# Patient Record
Sex: FEMALE | Race: WHITE | Employment: UNEMPLOYED | URBAN - METROPOLITAN AREA
[De-identification: names, ages, dates, MRNs, and addresses within clinical notes are randomized per-mention and may not be internally consistent; named-entity substitution may affect disease eponyms.]

---

## 2018-04-23 ENCOUNTER — HOSPITAL ENCOUNTER (OUTPATIENT)
Dept: GENERAL RADIOLOGY | Age: 58
Discharge: HOME | End: 2018-04-25
Payer: COMMERCIAL

## 2018-04-23 ENCOUNTER — HOSPITAL ENCOUNTER (OUTPATIENT)
Age: 58
Discharge: HOME | End: 2018-04-25
Payer: COMMERCIAL

## 2018-04-23 DIAGNOSIS — M54.9 DORSAL BACK PAIN: ICD-10-CM

## 2018-04-23 DIAGNOSIS — M54.5 LOW BACK PAIN, UNSPECIFIED BACK PAIN LATERALITY, UNSPECIFIED CHRONICITY, WITH SCIATICA PRESENCE UNSPECIFIED: ICD-10-CM

## 2018-04-23 PROCEDURE — 72072 X-RAY EXAM THORAC SPINE 3VWS: CPT

## 2018-04-23 PROCEDURE — 72100 X-RAY EXAM L-S SPINE 2/3 VWS: CPT

## 2023-10-06 ENCOUNTER — APPOINTMENT (OUTPATIENT)
Dept: RADIOLOGY | Facility: HOSPITAL | Age: 63
End: 2023-10-06
Payer: MEDICAID

## 2023-10-06 ENCOUNTER — HOSPITAL ENCOUNTER (EMERGENCY)
Facility: HOSPITAL | Age: 63
Discharge: OTHER INSTITUTION | End: 2023-10-07
Attending: EMERGENCY MEDICINE | Admitting: EMERGENCY MEDICINE
Payer: MEDICAID

## 2023-10-06 DIAGNOSIS — K62.5 RECTAL BLEEDING: ICD-10-CM

## 2023-10-06 DIAGNOSIS — R19.7 DIARRHEA, UNSPECIFIED TYPE: ICD-10-CM

## 2023-10-06 DIAGNOSIS — E87.5 HYPERKALEMIA: ICD-10-CM

## 2023-10-06 DIAGNOSIS — E86.0 SEVERE DEHYDRATION: ICD-10-CM

## 2023-10-06 DIAGNOSIS — R57.9 SHOCK (MULTI): Primary | ICD-10-CM

## 2023-10-06 DIAGNOSIS — A41.9 SEPSIS, DUE TO UNSPECIFIED ORGANISM, UNSPECIFIED WHETHER ACUTE ORGAN DYSFUNCTION PRESENT (MULTI): ICD-10-CM

## 2023-10-06 DIAGNOSIS — N17.9 ACUTE KIDNEY INJURY (CMS-HCC): ICD-10-CM

## 2023-10-06 DIAGNOSIS — R55 NEAR SYNCOPE: ICD-10-CM

## 2023-10-06 DIAGNOSIS — E87.1 HYPONATREMIA: ICD-10-CM

## 2023-10-06 LAB
ALBUMIN SERPL BCP-MCNC: 4.4 G/DL (ref 3.4–5)
ALP SERPL-CCNC: 72 U/L (ref 33–136)
ALT SERPL W P-5'-P-CCNC: 31 U/L (ref 7–45)
ANION GAP BLDV CALCULATED.4IONS-SCNC: 20 MMOL/L (ref 10–25)
ANION GAP SERPL CALC-SCNC: 19 MMOL/L (ref 10–20)
APPARATUS: ABNORMAL
APPEARANCE UR: ABNORMAL
AST SERPL W P-5'-P-CCNC: 49 U/L (ref 9–39)
BASE EXCESS BLDV CALC-SCNC: -7.9 MMOL/L (ref -2–3)
BASOPHILS # BLD MANUAL: 0 X10*3/UL (ref 0–0.1)
BASOPHILS NFR BLD MANUAL: 0 %
BILIRUB DIRECT SERPL-MCNC: 0.1 MG/DL (ref 0–0.3)
BILIRUB SERPL-MCNC: 0.5 MG/DL (ref 0–1.2)
BILIRUB UR STRIP.AUTO-MCNC: NEGATIVE MG/DL
BNP SERPL-MCNC: 17 PG/ML (ref 0–99)
BODY TEMPERATURE: 37 DEGREES CELSIUS
BUN SERPL-MCNC: 37 MG/DL (ref 6–23)
CA-I BLDV-SCNC: 1.22 MMOL/L (ref 1.1–1.33)
CALCIUM SERPL-MCNC: 10.2 MG/DL (ref 8.6–10.3)
CARDIAC TROPONIN I PNL SERPL HS: 21 NG/L (ref 0–13)
CHLORIDE BLDV-SCNC: 84 MMOL/L (ref 98–107)
CHLORIDE SERPL-SCNC: 84 MMOL/L (ref 98–107)
CO2 SERPL-SCNC: 21 MMOL/L (ref 21–32)
COLOR UR: YELLOW
CREAT SERPL-MCNC: 2.12 MG/DL (ref 0.5–1.05)
EOSINOPHIL # BLD MANUAL: 1.09 X10*3/UL (ref 0–0.7)
EOSINOPHIL NFR BLD MANUAL: 4 %
ERYTHROCYTE [DISTWIDTH] IN BLOOD BY AUTOMATED COUNT: 14 % (ref 11.5–14.5)
GFR SERPL CREATININE-BSD FRML MDRD: 26 ML/MIN/1.73M*2
GLUCOSE BLD MANUAL STRIP-MCNC: 127 MG/DL (ref 74–99)
GLUCOSE BLD MANUAL STRIP-MCNC: 162 MG/DL (ref 74–99)
GLUCOSE BLDV-MCNC: 139 MG/DL (ref 74–99)
GLUCOSE SERPL-MCNC: 132 MG/DL (ref 74–99)
GLUCOSE UR STRIP.AUTO-MCNC: NEGATIVE MG/DL
HCO3 BLDV-SCNC: 18.3 MMOL/L (ref 22–26)
HCT VFR BLD AUTO: 35.8 % (ref 36–46)
HCT VFR BLD EST: 37 % (ref 36–46)
HEMOCCULT SP1 STL QL: POSITIVE
HGB BLD-MCNC: 12.4 G/DL (ref 12–16)
HGB BLDV-MCNC: 12.3 G/DL (ref 12–16)
IMM GRANULOCYTES # BLD AUTO: 0.39 X10*3/UL (ref 0–0.7)
IMM GRANULOCYTES NFR BLD AUTO: 1.4 % (ref 0–0.9)
INHALED O2 CONCENTRATION: 28 %
INR PPP: 0.9 (ref 0.9–1.1)
KETONES UR STRIP.AUTO-MCNC: NEGATIVE MG/DL
LACTATE BLDV-SCNC: 5.4 MMOL/L (ref 0.4–2)
LACTATE SERPL-SCNC: 5.1 MMOL/L (ref 0.4–2)
LEUKOCYTE ESTERASE UR QL STRIP.AUTO: ABNORMAL
LIPASE SERPL-CCNC: 155 U/L (ref 9–82)
LYMPHOCYTES # BLD MANUAL: 1.63 X10*3/UL (ref 1.2–4.8)
LYMPHOCYTES NFR BLD MANUAL: 6 %
MAGNESIUM SERPL-MCNC: 2.08 MG/DL (ref 1.6–2.4)
MCH RBC QN AUTO: 32.5 PG (ref 26–34)
MCHC RBC AUTO-ENTMCNC: 34.6 G/DL (ref 32–36)
MCV RBC AUTO: 94 FL (ref 80–100)
MONOCYTES # BLD MANUAL: 1.36 X10*3/UL (ref 0.1–1)
MONOCYTES NFR BLD MANUAL: 5 %
NEUTS SEG # BLD MANUAL: 23.12 X10*3/UL (ref 1.2–7)
NEUTS SEG NFR BLD MANUAL: 85 %
NITRITE UR QL STRIP.AUTO: NEGATIVE
NRBC BLD-RTO: 0 /100 WBCS (ref 0–0)
OXYHGB MFR BLDV: 56.4 % (ref 45–75)
PCO2 BLDV: 39 MM HG (ref 41–51)
PH BLDV: 7.28 PH (ref 7.33–7.43)
PH UR STRIP.AUTO: 5 [PH]
PLATELET # BLD AUTO: 455 X10*3/UL (ref 150–450)
PMV BLD AUTO: 8.6 FL (ref 7.5–11.5)
PO2 BLDV: 38 MM HG (ref 35–45)
POTASSIUM BLDV-SCNC: 6.3 MMOL/L (ref 3.5–5.3)
POTASSIUM SERPL-SCNC: 6 MMOL/L (ref 3.5–5.3)
PROT SERPL-MCNC: 7.1 G/DL (ref 6.4–8.2)
PROT UR STRIP.AUTO-MCNC: ABNORMAL MG/DL
PROTHROMBIN TIME: 10.2 SECONDS (ref 9.8–12.8)
RBC # BLD AUTO: 3.82 X10*6/UL (ref 4–5.2)
RBC # UR STRIP.AUTO: NEGATIVE /UL
RBC MORPH BLD: ABNORMAL
SAO2 % BLDV: 58 % (ref 45–75)
SARS-COV-2 RNA RESP QL NAA+PROBE: NOT DETECTED
SODIUM BLDV-SCNC: 116 MMOL/L (ref 136–145)
SODIUM SERPL-SCNC: 118 MMOL/L (ref 136–145)
SP GR UR STRIP.AUTO: 1.02
TOTAL CELLS COUNTED BLD: 100
UROBILINOGEN UR STRIP.AUTO-MCNC: ABNORMAL MG/DL
WBC # BLD AUTO: 27.2 X10*3/UL (ref 4.4–11.3)

## 2023-10-06 PROCEDURE — 36415 COLL VENOUS BLD VENIPUNCTURE: CPT | Performed by: EMERGENCY MEDICINE

## 2023-10-06 PROCEDURE — 71250 CT THORAX DX C-: CPT

## 2023-10-06 PROCEDURE — 96365 THER/PROPH/DIAG IV INF INIT: CPT

## 2023-10-06 PROCEDURE — 99291 CRITICAL CARE FIRST HOUR: CPT | Performed by: EMERGENCY MEDICINE

## 2023-10-06 PROCEDURE — 71045 X-RAY EXAM CHEST 1 VIEW: CPT | Mod: FY

## 2023-10-06 PROCEDURE — 2500000005 HC RX 250 GENERAL PHARMACY W/O HCPCS

## 2023-10-06 PROCEDURE — 84132 ASSAY OF SERUM POTASSIUM: CPT | Mod: 59

## 2023-10-06 PROCEDURE — 74176 CT ABD & PELVIS W/O CONTRAST: CPT

## 2023-10-06 PROCEDURE — 85610 PROTHROMBIN TIME: CPT | Performed by: EMERGENCY MEDICINE

## 2023-10-06 PROCEDURE — 71045 X-RAY EXAM CHEST 1 VIEW: CPT | Performed by: SURGERY

## 2023-10-06 PROCEDURE — 85007 BL SMEAR W/DIFF WBC COUNT: CPT | Performed by: EMERGENCY MEDICINE

## 2023-10-06 PROCEDURE — 87040 BLOOD CULTURE FOR BACTERIA: CPT | Mod: CMCLAB,CONLAB | Performed by: EMERGENCY MEDICINE

## 2023-10-06 PROCEDURE — 83735 ASSAY OF MAGNESIUM: CPT | Performed by: EMERGENCY MEDICINE

## 2023-10-06 PROCEDURE — 87075 CULTR BACTERIA EXCEPT BLOOD: CPT | Mod: CMCLAB,CONLAB | Performed by: EMERGENCY MEDICINE

## 2023-10-06 PROCEDURE — 96375 TX/PRO/DX INJ NEW DRUG ADDON: CPT | Mod: XU

## 2023-10-06 PROCEDURE — 2500000004 HC RX 250 GENERAL PHARMACY W/ HCPCS (ALT 636 FOR OP/ED)

## 2023-10-06 PROCEDURE — 82947 ASSAY GLUCOSE BLOOD QUANT: CPT

## 2023-10-06 PROCEDURE — 87635 SARS-COV-2 COVID-19 AMP PRB: CPT | Performed by: EMERGENCY MEDICINE

## 2023-10-06 PROCEDURE — 74176 CT ABD & PELVIS W/O CONTRAST: CPT | Performed by: STUDENT IN AN ORGANIZED HEALTH CARE EDUCATION/TRAINING PROGRAM

## 2023-10-06 PROCEDURE — 84484 ASSAY OF TROPONIN QUANT: CPT | Performed by: EMERGENCY MEDICINE

## 2023-10-06 PROCEDURE — 71250 CT THORAX DX C-: CPT | Performed by: STUDENT IN AN ORGANIZED HEALTH CARE EDUCATION/TRAINING PROGRAM

## 2023-10-06 PROCEDURE — 87493 C DIFF AMPLIFIED PROBE: CPT | Mod: CMCLAB,CONLAB | Performed by: EMERGENCY MEDICINE

## 2023-10-06 PROCEDURE — 2500000004 HC RX 250 GENERAL PHARMACY W/ HCPCS (ALT 636 FOR OP/ED): Mod: SE | Performed by: EMERGENCY MEDICINE

## 2023-10-06 PROCEDURE — 87086 URINE CULTURE/COLONY COUNT: CPT | Mod: CMCLAB,CONLAB | Performed by: EMERGENCY MEDICINE

## 2023-10-06 PROCEDURE — 84155 ASSAY OF PROTEIN SERUM: CPT | Performed by: EMERGENCY MEDICINE

## 2023-10-06 PROCEDURE — 96361 HYDRATE IV INFUSION ADD-ON: CPT | Mod: XU

## 2023-10-06 PROCEDURE — 82805 BLOOD GASES W/O2 SATURATION: CPT

## 2023-10-06 PROCEDURE — 82270 OCCULT BLOOD FECES: CPT | Performed by: EMERGENCY MEDICINE

## 2023-10-06 PROCEDURE — 83605 ASSAY OF LACTIC ACID: CPT | Performed by: EMERGENCY MEDICINE

## 2023-10-06 PROCEDURE — 81003 URINALYSIS AUTO W/O SCOPE: CPT | Performed by: EMERGENCY MEDICINE

## 2023-10-06 PROCEDURE — 83690 ASSAY OF LIPASE: CPT | Performed by: EMERGENCY MEDICINE

## 2023-10-06 PROCEDURE — 2500000001 HC RX 250 WO HCPCS SELF ADMINISTERED DRUGS (ALT 637 FOR MEDICARE OP): Performed by: EMERGENCY MEDICINE

## 2023-10-06 PROCEDURE — 80053 COMPREHEN METABOLIC PANEL: CPT | Performed by: EMERGENCY MEDICINE

## 2023-10-06 PROCEDURE — 87040 BLOOD CULTURE FOR BACTERIA: CPT | Mod: CMCLAB,CONLAB,91 | Performed by: EMERGENCY MEDICINE

## 2023-10-06 PROCEDURE — 2500000002 HC RX 250 W HCPCS SELF ADMINISTERED DRUGS (ALT 637 FOR MEDICARE OP, ALT 636 FOR OP/ED): Performed by: EMERGENCY MEDICINE

## 2023-10-06 PROCEDURE — 82248 BILIRUBIN DIRECT: CPT | Performed by: EMERGENCY MEDICINE

## 2023-10-06 PROCEDURE — 85027 COMPLETE CBC AUTOMATED: CPT | Performed by: EMERGENCY MEDICINE

## 2023-10-06 PROCEDURE — 51702 INSERT TEMP BLADDER CATH: CPT

## 2023-10-06 PROCEDURE — 83880 ASSAY OF NATRIURETIC PEPTIDE: CPT | Performed by: EMERGENCY MEDICINE

## 2023-10-06 RX ORDER — CALCIUM GLUCONATE 98 MG/ML
INJECTION, SOLUTION INTRAVENOUS
Status: COMPLETED
Start: 2023-10-06 | End: 2023-10-06

## 2023-10-06 RX ORDER — DEXTROSE MONOHYDRATE 25 G/50ML
25 INJECTION, SOLUTION INTRAVENOUS ONCE
Status: DISCONTINUED | OUTPATIENT
Start: 2023-10-06 | End: 2023-10-07 | Stop reason: HOSPADM

## 2023-10-06 RX ORDER — ONDANSETRON HYDROCHLORIDE 2 MG/ML
INJECTION, SOLUTION INTRAVENOUS
Status: DISCONTINUED
Start: 2023-10-06 | End: 2023-10-07 | Stop reason: HOSPADM

## 2023-10-06 RX ORDER — NAPROXEN SODIUM 220 MG/1
324 TABLET, FILM COATED ORAL ONCE
Status: COMPLETED | OUTPATIENT
Start: 2023-10-06 | End: 2023-10-06

## 2023-10-06 RX ORDER — CALCIUM GLUCONATE 20 MG/ML
2 INJECTION, SOLUTION INTRAVENOUS ONCE
Status: DISCONTINUED | OUTPATIENT
Start: 2023-10-06 | End: 2023-10-07 | Stop reason: HOSPADM

## 2023-10-06 RX ORDER — INDOMETHACIN 25 MG/1
50 CAPSULE ORAL ONCE
Status: DISCONTINUED | OUTPATIENT
Start: 2023-10-06 | End: 2023-10-07 | Stop reason: HOSPADM

## 2023-10-06 RX ORDER — NOREPINEPHRINE BITARTRATE/D5W 8 MG/250ML
PLASTIC BAG, INJECTION (ML) INTRAVENOUS
Status: COMPLETED
Start: 2023-10-06 | End: 2023-10-06

## 2023-10-06 RX ORDER — NOREPINEPHRINE BITARTRATE/D5W 8 MG/250ML
.01-1 PLASTIC BAG, INJECTION (ML) INTRAVENOUS CONTINUOUS
Status: DISCONTINUED | OUTPATIENT
Start: 2023-10-06 | End: 2023-10-07 | Stop reason: HOSPADM

## 2023-10-06 RX ORDER — DEXTROSE 50 % IN WATER (D50W) INTRAVENOUS SYRINGE
Status: COMPLETED
Start: 2023-10-06 | End: 2023-10-06

## 2023-10-06 RX ORDER — SODIUM CHLORIDE 9 MG/ML
150 INJECTION, SOLUTION INTRAVENOUS CONTINUOUS
Status: DISCONTINUED | OUTPATIENT
Start: 2023-10-06 | End: 2023-10-07 | Stop reason: HOSPADM

## 2023-10-06 RX ORDER — SODIUM BICARBONATE 1 MEQ/ML
SYRINGE (ML) INTRAVENOUS
Status: COMPLETED
Start: 2023-10-06 | End: 2023-10-06

## 2023-10-06 RX ORDER — METRONIDAZOLE 500 MG/100ML
500 INJECTION, SOLUTION INTRAVENOUS EVERY 8 HOURS
Status: DISCONTINUED | OUTPATIENT
Start: 2023-10-06 | End: 2023-10-07 | Stop reason: HOSPADM

## 2023-10-06 RX ADMIN — CALCIUM GLUCONATE 2 G: 98 INJECTION, SOLUTION INTRAVENOUS at 21:10

## 2023-10-06 RX ADMIN — HYDROCORTISONE SODIUM SUCCINATE 100 MG: 100 INJECTION, POWDER, FOR SOLUTION INTRAMUSCULAR; INTRAVENOUS at 21:30

## 2023-10-06 RX ADMIN — DEXTROSE MONOHYDRATE 50 ML: 25 INJECTION, SOLUTION INTRAVENOUS at 21:10

## 2023-10-06 RX ADMIN — SODIUM BICARBONATE 50 MEQ: 84 INJECTION, SOLUTION INTRAVENOUS at 20:58

## 2023-10-06 RX ADMIN — ASPIRIN 81 MG CHEWABLE TABLET 324 MG: 81 TABLET CHEWABLE at 20:55

## 2023-10-06 RX ADMIN — METRONIDAZOLE 500 MG: 500 INJECTION, SOLUTION INTRAVENOUS at 23:48

## 2023-10-06 RX ADMIN — INSULIN HUMAN 5 UNITS: 100 INJECTION, SOLUTION PARENTERAL at 21:12

## 2023-10-06 RX ADMIN — NOREPINEPHRINE BITARTRATE 0.01 MCG/KG/MIN: 8 INJECTION, SOLUTION INTRAVENOUS at 21:25

## 2023-10-06 RX ADMIN — SODIUM CHLORIDE 1000 ML: 9 INJECTION, SOLUTION INTRAVENOUS at 21:24

## 2023-10-06 RX ADMIN — Medication 0.01 MCG/KG/MIN: at 21:25

## 2023-10-06 RX ADMIN — SODIUM CHLORIDE 1000 ML: 9 INJECTION, SOLUTION INTRAVENOUS at 20:55

## 2023-10-06 ASSESSMENT — PAIN DESCRIPTION - FREQUENCY: FREQUENCY: CONSTANT/CONTINUOUS

## 2023-10-06 ASSESSMENT — PAIN - FUNCTIONAL ASSESSMENT: PAIN_FUNCTIONAL_ASSESSMENT: 0-10

## 2023-10-06 ASSESSMENT — PAIN DESCRIPTION - DESCRIPTORS: DESCRIPTORS: CRAMPING

## 2023-10-06 ASSESSMENT — COLUMBIA-SUICIDE SEVERITY RATING SCALE - C-SSRS
1. IN THE PAST MONTH, HAVE YOU WISHED YOU WERE DEAD OR WISHED YOU COULD GO TO SLEEP AND NOT WAKE UP?: NO
2. HAVE YOU ACTUALLY HAD ANY THOUGHTS OF KILLING YOURSELF?: NO
6. HAVE YOU EVER DONE ANYTHING, STARTED TO DO ANYTHING, OR PREPARED TO DO ANYTHING TO END YOUR LIFE?: NO

## 2023-10-06 ASSESSMENT — PAIN DESCRIPTION - ONSET: ONSET: ONGOING

## 2023-10-06 ASSESSMENT — PAIN DESCRIPTION - LOCATION: LOCATION: ABDOMEN

## 2023-10-06 ASSESSMENT — PAIN SCALES - GENERAL
PAINLEVEL_OUTOF10: 9
PAINLEVEL_OUTOF10: 9

## 2023-10-06 ASSESSMENT — PAIN DESCRIPTION - PROGRESSION: CLINICAL_PROGRESSION: NOT CHANGED

## 2023-10-07 ENCOUNTER — HOSPITAL ENCOUNTER (INPATIENT)
Facility: HOSPITAL | Age: 63
LOS: 9 days | Discharge: HOME | End: 2023-10-16
Attending: INTERNAL MEDICINE | Admitting: INTERNAL MEDICINE
Payer: MEDICAID

## 2023-10-07 ENCOUNTER — APPOINTMENT (OUTPATIENT)
Dept: RADIOLOGY | Facility: HOSPITAL | Age: 63
End: 2023-10-07
Payer: MEDICAID

## 2023-10-07 VITALS
HEIGHT: 63 IN | OXYGEN SATURATION: 98 % | TEMPERATURE: 97.4 F | RESPIRATION RATE: 15 BRPM | SYSTOLIC BLOOD PRESSURE: 119 MMHG | WEIGHT: 237 LBS | DIASTOLIC BLOOD PRESSURE: 87 MMHG | HEART RATE: 109 BPM | BODY MASS INDEX: 41.99 KG/M2

## 2023-10-07 DIAGNOSIS — A41.9 SEPTIC SHOCK (MULTI): Primary | ICD-10-CM

## 2023-10-07 DIAGNOSIS — K52.9 COLITIS: ICD-10-CM

## 2023-10-07 DIAGNOSIS — I50.9 CHRONIC CONGESTIVE HEART FAILURE, UNSPECIFIED HEART FAILURE TYPE (MULTI): Chronic | ICD-10-CM

## 2023-10-07 DIAGNOSIS — R65.21 SEPTIC SHOCK (MULTI): Primary | ICD-10-CM

## 2023-10-07 DIAGNOSIS — F10.10 ETOH ABUSE: ICD-10-CM

## 2023-10-07 LAB
ALBUMIN SERPL BCP-MCNC: 2.9 G/DL (ref 3.4–5)
ALBUMIN SERPL BCP-MCNC: 3.4 G/DL (ref 3.4–5)
ANION GAP BLDV CALCULATED.4IONS-SCNC: 12 MMOL/L (ref 10–25)
ANION GAP SERPL CALC-SCNC: 13 MMOL/L (ref 10–20)
ANION GAP SERPL CALC-SCNC: 13 MMOL/L (ref 10–20)
ANION GAP SERPL CALC-SCNC: 14 MMOL/L (ref 10–20)
APPARATUS: ABNORMAL
BASE EXCESS BLDV CALC-SCNC: -7.9 MMOL/L (ref -2–3)
BODY TEMPERATURE: 37 DEGREES CELSIUS
BUN SERPL-MCNC: 22 MG/DL (ref 6–23)
BUN SERPL-MCNC: 30 MG/DL (ref 6–23)
BUN SERPL-MCNC: 39 MG/DL (ref 6–23)
C DIF TOX TCDA+TCDB STL QL NAA+PROBE: NOT DETECTED
CA-I BLDV-SCNC: 0.97 MMOL/L (ref 1.1–1.33)
CALCIUM SERPL-MCNC: 6.8 MG/DL (ref 8.6–10.3)
CALCIUM SERPL-MCNC: 8.6 MG/DL (ref 8.6–10.3)
CALCIUM SERPL-MCNC: 9.1 MG/DL (ref 8.6–10.3)
CHLORIDE BLDV-SCNC: 100 MMOL/L (ref 98–107)
CHLORIDE SERPL-SCNC: 91 MMOL/L (ref 98–107)
CHLORIDE SERPL-SCNC: 93 MMOL/L (ref 98–107)
CHLORIDE SERPL-SCNC: 96 MMOL/L (ref 98–107)
CO2 SERPL-SCNC: 20 MMOL/L (ref 21–32)
CO2 SERPL-SCNC: 22 MMOL/L (ref 21–32)
CO2 SERPL-SCNC: 23 MMOL/L (ref 21–32)
CREAT SERPL-MCNC: 1.17 MG/DL (ref 0.5–1.05)
CREAT SERPL-MCNC: 1.56 MG/DL (ref 0.5–1.05)
CREAT SERPL-MCNC: 2.04 MG/DL (ref 0.5–1.05)
ERYTHROCYTE [DISTWIDTH] IN BLOOD BY AUTOMATED COUNT: 14.3 % (ref 11.5–14.5)
FLOW: 2 LPM
GFR SERPL CREATININE-BSD FRML MDRD: 27 ML/MIN/1.73M*2
GFR SERPL CREATININE-BSD FRML MDRD: 38 ML/MIN/1.73M*2
GFR SERPL CREATININE-BSD FRML MDRD: 53 ML/MIN/1.73M*2
GLUCOSE BLD MANUAL STRIP-MCNC: 126 MG/DL (ref 74–99)
GLUCOSE BLD MANUAL STRIP-MCNC: 145 MG/DL (ref 74–99)
GLUCOSE BLD MANUAL STRIP-MCNC: 157 MG/DL (ref 74–99)
GLUCOSE BLDV-MCNC: 178 MG/DL (ref 74–99)
GLUCOSE SERPL-MCNC: 129 MG/DL (ref 74–99)
GLUCOSE SERPL-MCNC: 154 MG/DL (ref 74–99)
GLUCOSE SERPL-MCNC: 175 MG/DL (ref 74–99)
HCO3 BLDV-SCNC: 17.6 MMOL/L (ref 22–26)
HCT VFR BLD AUTO: 31.1 % (ref 36–46)
HCT VFR BLD EST: 28 % (ref 36–46)
HGB BLD-MCNC: 10.5 G/DL (ref 12–16)
HGB BLDV-MCNC: 9.3 G/DL (ref 12–16)
LACTATE BLDV-SCNC: 1.4 MMOL/L (ref 0.4–2)
LACTATE SERPL-SCNC: 1.2 MMOL/L (ref 0.4–2)
MAGNESIUM SERPL-MCNC: 1.57 MG/DL (ref 1.6–2.4)
MAGNESIUM SERPL-MCNC: 1.91 MG/DL (ref 1.6–2.4)
MCH RBC QN AUTO: 31.3 PG (ref 26–34)
MCHC RBC AUTO-ENTMCNC: 33.8 G/DL (ref 32–36)
MCV RBC AUTO: 93 FL (ref 80–100)
NRBC BLD-RTO: 0 /100 WBCS (ref 0–0)
OXYHGB MFR BLDV: 65.7 % (ref 45–75)
PCO2 BLDV: 35 MM HG (ref 41–51)
PH BLDV: 7.31 PH (ref 7.33–7.43)
PHOSPHATE SERPL-MCNC: 3.4 MG/DL (ref 2.5–4.9)
PHOSPHATE SERPL-MCNC: 4.3 MG/DL (ref 2.5–4.9)
PLATELET # BLD AUTO: 400 X10*3/UL (ref 150–450)
PMV BLD AUTO: 9.3 FL (ref 7.5–11.5)
PO2 BLDV: 41 MM HG (ref 35–45)
POTASSIUM BLDV-SCNC: 4.2 MMOL/L (ref 3.5–5.3)
POTASSIUM SERPL-SCNC: 4.7 MMOL/L (ref 3.5–5.3)
POTASSIUM SERPL-SCNC: 5.2 MMOL/L (ref 3.5–5.3)
POTASSIUM SERPL-SCNC: 5.3 MMOL/L (ref 3.5–5.3)
RBC # BLD AUTO: 3.35 X10*6/UL (ref 4–5.2)
SAO2 % BLDV: 67 % (ref 45–75)
SODIUM BLDV-SCNC: 125 MMOL/L (ref 136–145)
SODIUM SERPL-SCNC: 123 MMOL/L (ref 136–145)
SODIUM SERPL-SCNC: 123 MMOL/L (ref 136–145)
SODIUM SERPL-SCNC: 124 MMOL/L (ref 136–145)
WBC # BLD AUTO: 11.9 X10*3/UL (ref 4.4–11.3)

## 2023-10-07 PROCEDURE — 2500000001 HC RX 250 WO HCPCS SELF ADMINISTERED DRUGS (ALT 637 FOR MEDICARE OP): Performed by: INTERNAL MEDICINE

## 2023-10-07 PROCEDURE — 83605 ASSAY OF LACTIC ACID: CPT | Performed by: EMERGENCY MEDICINE

## 2023-10-07 PROCEDURE — 2500000004 HC RX 250 GENERAL PHARMACY W/ HCPCS (ALT 636 FOR OP/ED): Performed by: INTERNAL MEDICINE

## 2023-10-07 PROCEDURE — 99291 CRITICAL CARE FIRST HOUR: CPT | Performed by: INTERNAL MEDICINE

## 2023-10-07 PROCEDURE — 2500000002 HC RX 250 W HCPCS SELF ADMINISTERED DRUGS (ALT 637 FOR MEDICARE OP, ALT 636 FOR OP/ED)

## 2023-10-07 PROCEDURE — 2500000002 HC RX 250 W HCPCS SELF ADMINISTERED DRUGS (ALT 637 FOR MEDICARE OP, ALT 636 FOR OP/ED): Performed by: INTERNAL MEDICINE

## 2023-10-07 PROCEDURE — 96372 THER/PROPH/DIAG INJ SC/IM: CPT | Performed by: INTERNAL MEDICINE

## 2023-10-07 PROCEDURE — 3E033XZ INTRODUCTION OF VASOPRESSOR INTO PERIPHERAL VEIN, PERCUTANEOUS APPROACH: ICD-10-PCS | Performed by: INTERNAL MEDICINE

## 2023-10-07 PROCEDURE — 84295 ASSAY OF SERUM SODIUM: CPT

## 2023-10-07 PROCEDURE — 85027 COMPLETE CBC AUTOMATED: CPT | Performed by: INTERNAL MEDICINE

## 2023-10-07 PROCEDURE — 71045 X-RAY EXAM CHEST 1 VIEW: CPT | Performed by: RADIOLOGY

## 2023-10-07 PROCEDURE — 83735 ASSAY OF MAGNESIUM: CPT

## 2023-10-07 PROCEDURE — 80069 RENAL FUNCTION PANEL: CPT | Performed by: EMERGENCY MEDICINE

## 2023-10-07 PROCEDURE — 83735 ASSAY OF MAGNESIUM: CPT | Performed by: INTERNAL MEDICINE

## 2023-10-07 PROCEDURE — 2500000001 HC RX 250 WO HCPCS SELF ADMINISTERED DRUGS (ALT 637 FOR MEDICARE OP)

## 2023-10-07 PROCEDURE — 2500000004 HC RX 250 GENERAL PHARMACY W/ HCPCS (ALT 636 FOR OP/ED)

## 2023-10-07 PROCEDURE — 71045 X-RAY EXAM CHEST 1 VIEW: CPT | Mod: 76

## 2023-10-07 PROCEDURE — 82374 ASSAY BLOOD CARBON DIOXIDE: CPT | Performed by: EMERGENCY MEDICINE

## 2023-10-07 PROCEDURE — 2500000005 HC RX 250 GENERAL PHARMACY W/O HCPCS: Performed by: INTERNAL MEDICINE

## 2023-10-07 PROCEDURE — 2020000001 HC ICU ROOM DAILY

## 2023-10-07 PROCEDURE — 2580000001 HC RX 258 IV SOLUTIONS: Performed by: INTERNAL MEDICINE

## 2023-10-07 PROCEDURE — 87506 IADNA-DNA/RNA PROBE TQ 6-11: CPT | Performed by: INTERNAL MEDICINE

## 2023-10-07 PROCEDURE — 96372 THER/PROPH/DIAG INJ SC/IM: CPT

## 2023-10-07 PROCEDURE — 71045 X-RAY EXAM CHEST 1 VIEW: CPT

## 2023-10-07 PROCEDURE — 82947 ASSAY GLUCOSE BLOOD QUANT: CPT

## 2023-10-07 PROCEDURE — 36415 COLL VENOUS BLD VENIPUNCTURE: CPT | Performed by: INTERNAL MEDICINE

## 2023-10-07 PROCEDURE — 36415 COLL VENOUS BLD VENIPUNCTURE: CPT | Performed by: EMERGENCY MEDICINE

## 2023-10-07 PROCEDURE — 99291 CRITICAL CARE FIRST HOUR: CPT

## 2023-10-07 PROCEDURE — 82947 ASSAY GLUCOSE BLOOD QUANT: CPT | Performed by: INTERNAL MEDICINE

## 2023-10-07 RX ORDER — ATORVASTATIN CALCIUM 40 MG/1
40 TABLET, FILM COATED ORAL
COMMUNITY
Start: 2013-09-12

## 2023-10-07 RX ORDER — LORAZEPAM 2 MG/ML
1 INJECTION INTRAMUSCULAR EVERY 2 HOUR PRN
Status: DISCONTINUED | OUTPATIENT
Start: 2023-10-07 | End: 2023-10-10

## 2023-10-07 RX ORDER — MONTELUKAST SODIUM 10 MG/1
10 TABLET ORAL DAILY
Status: DISCONTINUED | OUTPATIENT
Start: 2023-10-07 | End: 2023-10-16 | Stop reason: HOSPADM

## 2023-10-07 RX ORDER — AMLODIPINE BESYLATE 10 MG/1
10 TABLET ORAL DAILY
COMMUNITY

## 2023-10-07 RX ORDER — FLUTICASONE PROPIONATE 50 MCG
2 SPRAY, SUSPENSION (ML) NASAL DAILY PRN
COMMUNITY
Start: 2017-05-12

## 2023-10-07 RX ORDER — VANCOMYCIN HYDROCHLORIDE 750 MG/150ML
750 INJECTION, SOLUTION INTRAVENOUS EVERY 24 HOURS
Status: DISCONTINUED | OUTPATIENT
Start: 2023-10-08 | End: 2023-10-08

## 2023-10-07 RX ORDER — NYSTATIN 100000 U/G
CREAM TOPICAL 2 TIMES DAILY
Status: DISCONTINUED | OUTPATIENT
Start: 2023-10-07 | End: 2023-10-16 | Stop reason: HOSPADM

## 2023-10-07 RX ORDER — ALBUTEROL SULFATE 90 UG/1
2 AEROSOL, METERED RESPIRATORY (INHALATION) EVERY 4 HOURS PRN
Status: DISCONTINUED | OUTPATIENT
Start: 2023-10-07 | End: 2023-10-16 | Stop reason: HOSPADM

## 2023-10-07 RX ORDER — VANCOMYCIN HYDROCHLORIDE 125 MG/1
125 CAPSULE ORAL 4 TIMES DAILY
Status: DISCONTINUED | OUTPATIENT
Start: 2023-10-07 | End: 2023-10-07

## 2023-10-07 RX ORDER — ALBUTEROL SULFATE 90 UG/1
2 AEROSOL, METERED RESPIRATORY (INHALATION) EVERY 4 HOURS PRN
COMMUNITY
Start: 2017-05-12

## 2023-10-07 RX ORDER — EPINEPHRINE 0.3 MG/.3ML
INJECTION INTRAMUSCULAR ONCE AS NEEDED
COMMUNITY
Start: 2016-09-09

## 2023-10-07 RX ORDER — CARVEDILOL 25 MG/1
25 TABLET ORAL
COMMUNITY

## 2023-10-07 RX ORDER — MAGNESIUM SULFATE HEPTAHYDRATE 40 MG/ML
2 INJECTION, SOLUTION INTRAVENOUS ONCE
Status: COMPLETED | OUTPATIENT
Start: 2023-10-07 | End: 2023-10-07

## 2023-10-07 RX ORDER — ALBUTEROL SULFATE 0.83 MG/ML
2.5 SOLUTION RESPIRATORY (INHALATION) EVERY 2 HOUR PRN
Status: DISCONTINUED | OUTPATIENT
Start: 2023-10-07 | End: 2023-10-11

## 2023-10-07 RX ORDER — MULTIVIT-MIN/IRON FUM/FOLIC AC 7.5 MG-4
1 TABLET ORAL DAILY
Status: DISCONTINUED | OUTPATIENT
Start: 2023-10-07 | End: 2023-10-16 | Stop reason: HOSPADM

## 2023-10-07 RX ORDER — FOLIC ACID 1 MG/1
1 TABLET ORAL DAILY
Status: DISCONTINUED | OUTPATIENT
Start: 2023-10-07 | End: 2023-10-16 | Stop reason: HOSPADM

## 2023-10-07 RX ORDER — SPIRONOLACTONE 50 MG/1
1 TABLET, FILM COATED ORAL DAILY
COMMUNITY

## 2023-10-07 RX ORDER — CEFTRIAXONE 1 G/50ML
1 INJECTION, SOLUTION INTRAVENOUS EVERY 24 HOURS
Status: DISCONTINUED | OUTPATIENT
Start: 2023-10-07 | End: 2023-10-07

## 2023-10-07 RX ORDER — METRONIDAZOLE 500 MG/100ML
500 INJECTION, SOLUTION INTRAVENOUS EVERY 8 HOURS
Status: DISCONTINUED | OUTPATIENT
Start: 2023-10-07 | End: 2023-10-07

## 2023-10-07 RX ORDER — FLUTICASONE PROPIONATE 50 MCG
2 SPRAY, SUSPENSION (ML) NASAL DAILY PRN
Status: DISCONTINUED | OUTPATIENT
Start: 2023-10-07 | End: 2023-10-16 | Stop reason: HOSPADM

## 2023-10-07 RX ORDER — FLUTICASONE FUROATE AND VILANTEROL 200; 25 UG/1; UG/1
1 POWDER RESPIRATORY (INHALATION)
Status: DISCONTINUED | OUTPATIENT
Start: 2023-10-07 | End: 2023-10-16 | Stop reason: HOSPADM

## 2023-10-07 RX ORDER — ASPIRIN 81 MG/1
81 TABLET ORAL DAILY
Status: DISCONTINUED | OUTPATIENT
Start: 2023-10-07 | End: 2023-10-16 | Stop reason: HOSPADM

## 2023-10-07 RX ORDER — LORAZEPAM 2 MG/ML
0.5 INJECTION INTRAMUSCULAR EVERY 2 HOUR PRN
Status: DISCONTINUED | OUTPATIENT
Start: 2023-10-07 | End: 2023-10-10

## 2023-10-07 RX ORDER — HYDROMORPHONE HYDROCHLORIDE 1 MG/ML
0.2 INJECTION, SOLUTION INTRAMUSCULAR; INTRAVENOUS; SUBCUTANEOUS ONCE
Status: COMPLETED | OUTPATIENT
Start: 2023-10-07 | End: 2023-10-07

## 2023-10-07 RX ORDER — CYCLOBENZAPRINE HCL 10 MG
5 TABLET ORAL ONCE
Status: COMPLETED | OUTPATIENT
Start: 2023-10-07 | End: 2023-10-07

## 2023-10-07 RX ORDER — CEFEPIME HYDROCHLORIDE 2 G/1
INJECTION, POWDER, FOR SOLUTION INTRAVENOUS
Status: DISCONTINUED
Start: 2023-10-07 | End: 2023-10-07 | Stop reason: HOSPADM

## 2023-10-07 RX ORDER — DICLOFENAC SODIUM 10 MG/G
4 GEL TOPICAL 3 TIMES DAILY PRN
COMMUNITY

## 2023-10-07 RX ORDER — HEPARIN SODIUM 5000 [USP'U]/ML
7500 INJECTION, SOLUTION INTRAVENOUS; SUBCUTANEOUS EVERY 8 HOURS SCHEDULED
Status: DISCONTINUED | OUTPATIENT
Start: 2023-10-07 | End: 2023-10-09 | Stop reason: ALTCHOICE

## 2023-10-07 RX ORDER — DEXTROSE MONOHYDRATE 100 MG/ML
0.3 INJECTION, SOLUTION INTRAVENOUS ONCE AS NEEDED
Status: DISCONTINUED | OUTPATIENT
Start: 2023-10-07 | End: 2023-10-16 | Stop reason: HOSPADM

## 2023-10-07 RX ORDER — LORAZEPAM 2 MG/ML
2 INJECTION INTRAMUSCULAR EVERY 2 HOUR PRN
Status: DISCONTINUED | OUTPATIENT
Start: 2023-10-07 | End: 2023-10-10

## 2023-10-07 RX ORDER — ATORVASTATIN CALCIUM 40 MG/1
40 TABLET, FILM COATED ORAL DAILY
Status: DISCONTINUED | OUTPATIENT
Start: 2023-10-07 | End: 2023-10-07

## 2023-10-07 RX ORDER — DICLOFENAC SODIUM 10 MG/G
4 GEL TOPICAL 3 TIMES DAILY PRN
Status: DISCONTINUED | OUTPATIENT
Start: 2023-10-07 | End: 2023-10-07

## 2023-10-07 RX ORDER — DILTIAZEM HYDROCHLORIDE 240 MG/1
240 CAPSULE, COATED, EXTENDED RELEASE ORAL DAILY
COMMUNITY
Start: 2021-02-05 | End: 2023-10-16 | Stop reason: HOSPADM

## 2023-10-07 RX ORDER — NOREPINEPHRINE BITARTRATE/D5W 8 MG/250ML
.01-1 PLASTIC BAG, INJECTION (ML) INTRAVENOUS CONTINUOUS
Status: DISCONTINUED | OUTPATIENT
Start: 2023-10-07 | End: 2023-10-09

## 2023-10-07 RX ORDER — DEXTROSE 50 % IN WATER (D50W) INTRAVENOUS SYRINGE
25
Status: DISCONTINUED | OUTPATIENT
Start: 2023-10-07 | End: 2023-10-16 | Stop reason: HOSPADM

## 2023-10-07 RX ORDER — DULOXETIN HYDROCHLORIDE 60 MG/1
1 CAPSULE, DELAYED RELEASE ORAL 2 TIMES DAILY
COMMUNITY
Start: 2009-09-24

## 2023-10-07 RX ORDER — MONTELUKAST SODIUM 10 MG/1
10 TABLET ORAL
COMMUNITY
Start: 2017-05-12

## 2023-10-07 RX ORDER — EPINEPHRINE 0.3 MG/.3ML
0.3 INJECTION SUBCUTANEOUS ONCE AS NEEDED
Status: DISCONTINUED | OUTPATIENT
Start: 2023-10-07 | End: 2023-10-16 | Stop reason: HOSPADM

## 2023-10-07 RX ORDER — INSULIN LISPRO 100 [IU]/ML
0-10 INJECTION, SOLUTION INTRAVENOUS; SUBCUTANEOUS
Status: DISCONTINUED | OUTPATIENT
Start: 2023-10-07 | End: 2023-10-16 | Stop reason: HOSPADM

## 2023-10-07 RX ORDER — BUMETANIDE 2 MG/1
2 TABLET ORAL DAILY
COMMUNITY

## 2023-10-07 RX ORDER — LISINOPRIL 40 MG/1
1 TABLET ORAL DAILY
COMMUNITY

## 2023-10-07 RX ORDER — ASPIRIN 81 MG/1
81 TABLET ORAL
COMMUNITY
Start: 2018-04-26

## 2023-10-07 RX ORDER — DICLOFENAC SODIUM 10 MG/G
4 GEL TOPICAL 3 TIMES DAILY PRN
Status: DISCONTINUED | OUTPATIENT
Start: 2023-10-07 | End: 2023-10-16 | Stop reason: HOSPADM

## 2023-10-07 RX ORDER — INSULIN LISPRO 100 [IU]/ML
0-5 INJECTION, SOLUTION INTRAVENOUS; SUBCUTANEOUS
Status: DISCONTINUED | OUTPATIENT
Start: 2023-10-07 | End: 2023-10-07

## 2023-10-07 RX ORDER — ALBUTEROL SULFATE 0.83 MG/ML
2.5 SOLUTION RESPIRATORY (INHALATION) EVERY 6 HOURS PRN
COMMUNITY

## 2023-10-07 RX ORDER — METFORMIN HYDROCHLORIDE 500 MG/1
1 TABLET ORAL
COMMUNITY

## 2023-10-07 RX ORDER — ACETAMINOPHEN 325 MG/1
650 TABLET ORAL EVERY 6 HOURS PRN
Status: DISCONTINUED | OUTPATIENT
Start: 2023-10-07 | End: 2023-10-12

## 2023-10-07 RX ORDER — ALBUTEROL SULFATE 0.83 MG/ML
2.5 SOLUTION RESPIRATORY (INHALATION) EVERY 6 HOURS PRN
Status: DISCONTINUED | OUTPATIENT
Start: 2023-10-07 | End: 2023-10-07

## 2023-10-07 RX ORDER — ATORVASTATIN CALCIUM 40 MG/1
40 TABLET, FILM COATED ORAL DAILY
Status: DISCONTINUED | OUTPATIENT
Start: 2023-10-07 | End: 2023-10-09

## 2023-10-07 RX ORDER — METOCLOPRAMIDE 10 MG/1
1 TABLET ORAL 3 TIMES DAILY
COMMUNITY

## 2023-10-07 RX ORDER — GABAPENTIN 300 MG/1
300 CAPSULE ORAL ONCE
Status: COMPLETED | OUTPATIENT
Start: 2023-10-07 | End: 2023-10-07

## 2023-10-07 RX ADMIN — GABAPENTIN 300 MG: 300 CAPSULE ORAL at 20:30

## 2023-10-07 RX ADMIN — HYDROMORPHONE HYDROCHLORIDE 0.2 MG: 1 INJECTION, SOLUTION INTRAMUSCULAR; INTRAVENOUS; SUBCUTANEOUS at 20:53

## 2023-10-07 RX ADMIN — NYSTATIN: 100000 CREAM TOPICAL at 21:08

## 2023-10-07 RX ADMIN — MAGNESIUM SULFATE HEPTAHYDRATE 2 G: 2 INJECTION, SOLUTION INTRAVENOUS at 14:34

## 2023-10-07 RX ADMIN — LORAZEPAM 1 MG: 2 INJECTION INTRAMUSCULAR; INTRAVENOUS at 09:44

## 2023-10-07 RX ADMIN — FOLIC ACID 1 MG: 1 TABLET ORAL at 09:09

## 2023-10-07 RX ADMIN — ASPIRIN 81 MG: 81 TABLET, COATED ORAL at 08:07

## 2023-10-07 RX ADMIN — LORAZEPAM 1 MG: 2 INJECTION INTRAMUSCULAR; INTRAVENOUS at 19:31

## 2023-10-07 RX ADMIN — HEPARIN SODIUM 7500 UNITS: 5000 INJECTION INTRAVENOUS; SUBCUTANEOUS at 09:10

## 2023-10-07 RX ADMIN — PIPERACILLIN SODIUM AND TAZOBACTAM SODIUM 4.5 G: 4; .5 INJECTION, SOLUTION INTRAVENOUS at 14:32

## 2023-10-07 RX ADMIN — MONTELUKAST 10 MG: 10 TABLET, FILM COATED ORAL at 08:05

## 2023-10-07 RX ADMIN — SODIUM CHLORIDE 1000 ML: 9 INJECTION, SOLUTION INTRAVENOUS at 22:32

## 2023-10-07 RX ADMIN — SODIUM CHLORIDE 500 ML: 9 INJECTION, SOLUTION INTRAVENOUS at 11:30

## 2023-10-07 RX ADMIN — ATORVASTATIN CALCIUM 40 MG: 40 TABLET, FILM COATED ORAL at 20:32

## 2023-10-07 RX ADMIN — SODIUM CHLORIDE 1000 ML: 9 INJECTION, SOLUTION INTRAVENOUS at 20:59

## 2023-10-07 RX ADMIN — VANCOMYCIN HYDROCHLORIDE 2000 MG: 10 INJECTION, POWDER, LYOPHILIZED, FOR SOLUTION INTRAVENOUS at 12:55

## 2023-10-07 RX ADMIN — FLUTICASONE FUROATE AND VILANTEROL TRIFENATATE 1 PUFF: 200; 25 POWDER RESPIRATORY (INHALATION) at 12:56

## 2023-10-07 RX ADMIN — CYCLOBENZAPRINE 5 MG: 10 TABLET, FILM COATED ORAL at 20:29

## 2023-10-07 RX ADMIN — PIPERACILLIN SODIUM AND TAZOBACTAM SODIUM 4.5 G: 4; .5 INJECTION, SOLUTION INTRAVENOUS at 20:59

## 2023-10-07 RX ADMIN — CEFTRIAXONE SODIUM 1 G: 1 INJECTION, SOLUTION INTRAVENOUS at 08:07

## 2023-10-07 RX ADMIN — SODIUM CHLORIDE, POTASSIUM CHLORIDE, SODIUM LACTATE AND CALCIUM CHLORIDE 1000 ML: 600; 310; 30; 20 INJECTION, SOLUTION INTRAVENOUS at 07:00

## 2023-10-07 RX ADMIN — NOREPINEPHRINE BITARTRATE 0.02 MCG/KG/MIN: 8 INJECTION, SOLUTION INTRAVENOUS at 06:43

## 2023-10-07 RX ADMIN — INSULIN LISPRO 2 UNITS: 100 INJECTION, SOLUTION INTRAVENOUS; SUBCUTANEOUS at 17:21

## 2023-10-07 RX ADMIN — METRONIDAZOLE 500 MG: 500 INJECTION, SOLUTION INTRAVENOUS at 08:08

## 2023-10-07 RX ADMIN — SODIUM CHLORIDE 1000 ML: 9 INJECTION, SOLUTION INTRAVENOUS at 16:18

## 2023-10-07 RX ADMIN — VANCOMYCIN HYDROCHLORIDE 125 MG: 125 CAPSULE ORAL at 14:31

## 2023-10-07 RX ADMIN — HEPARIN SODIUM 7500 UNITS: 5000 INJECTION INTRAVENOUS; SUBCUTANEOUS at 17:11

## 2023-10-07 RX ADMIN — MULTIPLE VITAMINS W/ MINERALS TAB 1 TABLET: TAB at 09:10

## 2023-10-07 SDOH — SOCIAL STABILITY: SOCIAL INSECURITY: WERE YOU ABLE TO COMPLETE ALL THE BEHAVIORAL HEALTH SCREENINGS?: YES

## 2023-10-07 SDOH — SOCIAL STABILITY: SOCIAL INSECURITY: HAS ANYONE EVER THREATENED TO HURT YOUR FAMILY OR YOUR PETS?: NO

## 2023-10-07 SDOH — SOCIAL STABILITY: SOCIAL INSECURITY: DOES ANYONE TRY TO KEEP YOU FROM HAVING/CONTACTING OTHER FRIENDS OR DOING THINGS OUTSIDE YOUR HOME?: NO

## 2023-10-07 SDOH — SOCIAL STABILITY: SOCIAL INSECURITY: ARE YOU OR HAVE YOU BEEN THREATENED OR ABUSED PHYSICALLY, EMOTIONALLY, OR SEXUALLY BY ANYONE?: NO

## 2023-10-07 SDOH — SOCIAL STABILITY: SOCIAL INSECURITY: ABUSE: ADULT

## 2023-10-07 SDOH — SOCIAL STABILITY: SOCIAL INSECURITY: DO YOU FEEL UNSAFE GOING BACK TO THE PLACE WHERE YOU ARE LIVING?: NO

## 2023-10-07 SDOH — SOCIAL STABILITY: SOCIAL INSECURITY: DO YOU FEEL ANYONE HAS EXPLOITED OR TAKEN ADVANTAGE OF YOU FINANCIALLY OR OF YOUR PERSONAL PROPERTY?: NO

## 2023-10-07 SDOH — SOCIAL STABILITY: SOCIAL INSECURITY: HAVE YOU HAD THOUGHTS OF HARMING ANYONE ELSE?: NO

## 2023-10-07 SDOH — SOCIAL STABILITY: SOCIAL INSECURITY: ARE THERE ANY APPARENT SIGNS OF INJURIES/BEHAVIORS THAT COULD BE RELATED TO ABUSE/NEGLECT?: NO

## 2023-10-07 ASSESSMENT — PAIN SCALES - GENERAL
PAINLEVEL_OUTOF10: 10 - WORST POSSIBLE PAIN
PAINLEVEL_OUTOF10: 0 - NO PAIN
PAINLEVEL_OUTOF10: 9
PAINLEVEL_OUTOF10: 0 - NO PAIN
PAINLEVEL_OUTOF10: 10 - WORST POSSIBLE PAIN

## 2023-10-07 ASSESSMENT — LIFESTYLE VARIABLES
ANXIETY: 2
TREMOR: 3
TACTILE DISTURBANCES: MILD ITCHING, PINS AND NEEDLES, BURNING OR NUMBNESS
VISUAL DISTURBANCES: NOT PRESENT
PULSE: 107
HEADACHE, FULLNESS IN HEAD: NOT PRESENT
TREMOR: MODERATE, WITH PATIENT'S ARMS EXTENDED
ORIENTATION AND CLOUDING OF SENSORIUM: CANNOT DO SERIAL ADDITIONS OR IS UNCERTAIN ABOUT DATE
PAROXYSMAL SWEATS: NO SWEAT VISIBLE
AGITATION: 2
NAUSEA AND VOMITING: NO NAUSEA AND NO VOMITING
TOTAL SCORE: 9
TREMOR: 3
HEADACHE, FULLNESS IN HEAD: NOT PRESENT
AGITATION: 2
TACTILE DISTURBANCES: MILD ITCHING, PINS AND NEEDLES, BURNING OR NUMBNESS
VISUAL DISTURBANCES: NOT PRESENT
PAROXYSMAL SWEATS: NO SWEAT VISIBLE
AUDITORY DISTURBANCES: NOT PRESENT
ANXIETY: 2
AUDITORY DISTURBANCES: NOT PRESENT
ANXIETY: MILDLY ANXIOUS
PAROXYSMAL SWEATS: NO SWEAT VISIBLE
AUDITORY DISTURBANCES: NOT PRESENT
AGITATION: SOMEWHAT MORE THAN NORMAL ACTIVITY
PRESCIPTION_ABUSE_PAST_12_MONTHS: NO
TOTAL SCORE: 9
AUDITORY DISTURBANCES: NOT PRESENT
TOTAL SCORE: 3
NAUSEA AND VOMITING: NO NAUSEA AND NO VOMITING
TOTAL SCORE: 10
ORIENTATION AND CLOUDING OF SENSORIUM: ORIENTED AND CAN DO SERIAL ADDITIONS
VISUAL DISTURBANCES: NOT PRESENT
HEADACHE, FULLNESS IN HEAD: NOT PRESENT
PULSE: 129
ANXIETY: 2
PAROXYSMAL SWEATS: NO SWEAT VISIBLE
TOTAL SCORE: 5
PAROXYSMAL SWEATS: NO SWEAT VISIBLE
ORIENTATION AND CLOUDING OF SENSORIUM: CANNOT DO SERIAL ADDITIONS OR IS UNCERTAIN ABOUT DATE
NAUSEA AND VOMITING: NO NAUSEA AND NO VOMITING
ANXIETY: 2
HEADACHE, FULLNESS IN HEAD: NOT PRESENT
NAUSEA AND VOMITING: NO NAUSEA AND NO VOMITING
AUDITORY DISTURBANCES: NOT PRESENT
TREMOR: 2
VISUAL DISTURBANCES: NOT PRESENT
HEADACHE, FULLNESS IN HEAD: NOT PRESENT
BLOOD PRESSURE: 74/64
AUDIT-C TOTAL SCORE: 2
PAROXYSMAL SWEATS: NO SWEAT VISIBLE
ANXIETY: 2
AGITATION: 3
AUDITORY DISTURBANCES: NOT PRESENT
HOW MANY STANDARD DRINKS CONTAINING ALCOHOL DO YOU HAVE ON A TYPICAL DAY: 1 OR 2
HEADACHE, FULLNESS IN HEAD: NOT PRESENT
VISUAL DISTURBANCES: NOT PRESENT
VISUAL DISTURBANCES: NOT PRESENT
TOTAL SCORE: 11
AUDITORY DISTURBANCES: NOT PRESENT
ANXIETY: 2
PAROXYSMAL SWEATS: NO SWEAT VISIBLE
ORIENTATION AND CLOUDING OF SENSORIUM: CANNOT DO SERIAL ADDITIONS OR IS UNCERTAIN ABOUT DATE
TREMOR: NO TREMOR
AUDITORY DISTURBANCES: NOT PRESENT
TREMOR: 3
ANXIETY: 2
TREMOR: 3
HEADACHE, FULLNESS IN HEAD: NOT PRESENT
PULSE: 116
ORIENTATION AND CLOUDING OF SENSORIUM: CANNOT DO SERIAL ADDITIONS OR IS UNCERTAIN ABOUT DATE
PAROXYSMAL SWEATS: NO SWEAT VISIBLE
TREMOR: 3
AUDIT-C TOTAL SCORE: 2
BLOOD PRESSURE: 105/48
NAUSEA AND VOMITING: NO NAUSEA AND NO VOMITING
ORIENTATION AND CLOUDING OF SENSORIUM: CANNOT DO SERIAL ADDITIONS OR IS UNCERTAIN ABOUT DATE
VISUAL DISTURBANCES: NOT PRESENT
ORIENTATION AND CLOUDING OF SENSORIUM: CANNOT DO SERIAL ADDITIONS OR IS UNCERTAIN ABOUT DATE
TOTAL SCORE: 9
NAUSEA AND VOMITING: NO NAUSEA AND NO VOMITING
PAROXYSMAL SWEATS: BARELY PERCEPTIBLE SWEATING, PALMS MOIST
HOW OFTEN DO YOU HAVE 6 OR MORE DRINKS ON ONE OCCASION: LESS THAN MONTHLY
SKIP TO QUESTIONS 9-10: 0
AGITATION: NORMAL ACTIVITY
SUBSTANCE_ABUSE_PAST_12_MONTHS: NO
AGITATION: 3
NAUSEA AND VOMITING: MILD NAUSEA WITH NO VOMITING
TACTILE DISTURBANCES: MILD ITCHING, PINS AND NEEDLES, BURNING OR NUMBNESS
VISUAL DISTURBANCES: NOT PRESENT
HEADACHE, FULLNESS IN HEAD: NOT PRESENT
AGITATION: NORMAL ACTIVITY
ORIENTATION AND CLOUDING OF SENSORIUM: CANNOT DO SERIAL ADDITIONS OR IS UNCERTAIN ABOUT DATE
TREMOR: 3
AGITATION: 3
ANXIETY: 2
NAUSEA AND VOMITING: NO NAUSEA AND NO VOMITING
ORIENTATION AND CLOUDING OF SENSORIUM: CANNOT DO SERIAL ADDITIONS OR IS UNCERTAIN ABOUT DATE
VISUAL DISTURBANCES: NOT PRESENT
HEADACHE, FULLNESS IN HEAD: NOT PRESENT
AUDITORY DISTURBANCES: NOT PRESENT
TOTAL SCORE: 9
TOTAL SCORE: 9
NAUSEA AND VOMITING: NO NAUSEA AND NO VOMITING
TACTILE DISTURBANCES: MILD ITCHING, PINS AND NEEDLES, BURNING OR NUMBNESS
AGITATION: SOMEWHAT MORE THAN NORMAL ACTIVITY
HOW OFTEN DO YOU HAVE A DRINK CONTAINING ALCOHOL: MONTHLY OR LESS

## 2023-10-07 ASSESSMENT — ENCOUNTER SYMPTOMS
ABDOMINAL DISTENTION: 1
SHORTNESS OF BREATH: 1
DIARRHEA: 1
DIZZINESS: 1
WOUND: 1
FATIGUE: 1
ENDOCRINE NEGATIVE: 1
NAUSEA: 1
EYES NEGATIVE: 1
WEAKNESS: 1
ABDOMINAL PAIN: 1
TREMORS: 1
ALLERGIC/IMMUNOLOGIC NEGATIVE: 1
CONFUSION: 1
ACTIVITY CHANGE: 1

## 2023-10-07 ASSESSMENT — COGNITIVE AND FUNCTIONAL STATUS - GENERAL
PATIENT BASELINE BEDBOUND: NO
DAILY ACTIVITIY SCORE: 24
MOBILITY SCORE: 24
DAILY ACTIVITIY SCORE: 24
MOBILITY SCORE: 24

## 2023-10-07 ASSESSMENT — ACTIVITIES OF DAILY LIVING (ADL)
BATHING: INDEPENDENT
WALKS IN HOME: NEEDS ASSISTANCE
TOILETING: NEEDS ASSISTANCE
PATIENT'S MEMORY ADEQUATE TO SAFELY COMPLETE DAILY ACTIVITIES?: YES
HEARING - RIGHT EAR: FUNCTIONAL
HEARING - LEFT EAR: FUNCTIONAL
JUDGMENT_ADEQUATE_SAFELY_COMPLETE_DAILY_ACTIVITIES: YES
ASSISTIVE_DEVICE: EYEGLASSES
FEEDING YOURSELF: INDEPENDENT
ADEQUATE_TO_COMPLETE_ADL: YES
GROOMING: INDEPENDENT
DRESSING YOURSELF: INDEPENDENT

## 2023-10-07 ASSESSMENT — PAIN - FUNCTIONAL ASSESSMENT
PAIN_FUNCTIONAL_ASSESSMENT: 0-10

## 2023-10-07 ASSESSMENT — PATIENT HEALTH QUESTIONNAIRE - PHQ9
SUM OF ALL RESPONSES TO PHQ9 QUESTIONS 1 & 2: 0
2. FEELING DOWN, DEPRESSED OR HOPELESS: NOT AT ALL
1. LITTLE INTEREST OR PLEASURE IN DOING THINGS: NOT AT ALL

## 2023-10-07 NOTE — HOSPITAL COURSE
Keisha Santa is a 62 y.o. female with PMH of HTN, HLD, T2DM, MUKESH non-compliant with CPAP, COPD, Parkinsonism, cervical cancer, 2 CVAs, HFpEF w/ EF 65%, PVD, chronic diarrhea, and chronic alcoholism who presented to [facility] ED with weakness, diarrhea, and worsening mental status Patient lives in [place], but has traveled to [place] for a family wedding. Patient has had chronic diarrhea for about 4 months, and has been worked up outpatient for it. For 2-3 days prior to admission, the patient has had worsening weakness, periumbilical and lower abdominal pain, and poor PO intake. Patient was recently seen in [place] OP clinic on 10/3 for LLE cellulitis, and was prescribed Bactrim and mupirocin cream for it. At the ED, the patient was found to be hypotensive and tachycardic, which required Levophed. She was found to have low SpO2, and was started on 3L O2 via NC. She received a CT Chest abdomen and pelvis which showed a fluid-filled large bowel that's concerning for colitis. On labs, she was found to have significant hyponatremia at 118, and hyperkalemia at 6.0. UA notable for 500 leukocyte esterase. BUN/Cr 77/2.12, her baseline per chart review is around 0.7. WBC count of 27.2. She received a VBG which showed pH 7.28, pCO2 39, pO2 38, Lactate 5.4 and bicarb of 18.3. She received cefepime, metronidazole, 2.5L fluid bolus, and bicarb, and was transferred to [facility] ICU and admitted for septic shock 2/2 colitis with possible component of cellulitis and UTI.    ICU course: Upon arrival to ICU, the patient was started on ceftriaxone and continued metronidazole. Patient received a 1L LR bolus. Repeat labs showed mild improvement of kidney function. Stool studies and C. Diff was sent out to rule out an infectious process of diarrhea. Given concern for cellulitis and C.diff infection, patient's antibiotics were upgraded to IV and PO vancomycin and Zosyn. C. Diff negative, so PO vanc was discontinued. ID consulted. Levophed  was discontinued on day 10/8 and BP's remained stable.  Received more fluids with improvement of kidney function. Patient continued to show improvement of mental status, and was able to wean down O2.  IV vancomycin was discontinued on 10/9 as concerns for cellulitis were low, and she was continued on IV Zosyn.  Patient had an episode of acute hypoxia and respiratory distress on 10/10 AM during rounds and found to be wheezing.  Given steroids, DuoNebs, and placed on high flow nasal cannula for COPD exacerbation with subsequent symptomatic improvement.  CT/PE negative for PE, but did show worsening interstitial edema.  Maintenance fluids were discontinued, and patient remains stable, so decision was made to transfer her to the floors on 10/10.    On the medical floors pt was continued on home HTN/CHF medications (amlodipine 10mg daily, carvedilol 25mg BID, spironolactone 50mg) and started on lisinopril 10mg. She was gradually increased to home lisinopril of 40mg daily. She completed a 3 day course of prednisone 50mg, followed by 30mg for 2 days which improved pt's breathing. Her NC oxygen was weaned on the floor to RA. IV zosyn was stopped after a 7 day course for colitis. Pt continued to experience frequent watery stools, C diff testing was repeated. Once negative, pt was started on imodium 2mg BID with improvement in stool output. PT/OT evaluated pt and advised moderate intensity level of continued care. Pt was discharged to home due to lack of insurance coverage for SNF. Prescription for imodium was provided. Pt was told to follow up with PCP in [place], pt declined establishing care in [facility] as she is here temporarily.

## 2023-10-07 NOTE — PROGRESS NOTES
Vancomycin Dosing by Pharmacy- INITIAL    Keisha Santa is a 62 y.o. year old female who pharmacy has been consulted for vancomycin dosing for cellulitis, skin and soft tissue. Based on the patient's indication and renal status this patient will be dosed based on a goal AUC of 400-600.     Patient is currently in [place], Scr on admission was 2.12 and repeat was 2.04 post fluids. Close monitoring of renal function and vancomycin levels is indicated.    Visit Vitals  BP 74/64   Pulse (!) 129   Temp 36.3 °C (97.3 °F) (Temporal) Comment: Simultaneous filing. User may not have seen previous data.   Resp (!) 27      Lab Results   Component Value Date    CREATININE 2.04 (H) 10/07/2023    CREATININE 2.12 (H) 10/06/2023     I/O last 3 completed shifts:  In: 100 (0.9 mL/kg) [I.V.:100 (0.9 mL/kg)]  Out: 300 (2.7 mL/kg) [Urine:300 (0.1 mL/kg/hr)]  Weight: 109.4 kg     Lab Results   Component Value Date    PATIENTTEMP 37.0 10/07/2023    PATIENTTEMP 37.0 10/06/2023        Assessment/Plan  Patient will be given a loading dose of 2000 mg due to suspected sepsis and patient currently receiving a continuous infusion of Levophed.  Will initiate vancomycin maintenance,  750 mg every 24 hours.    This dosing regimen is predicted by [employer] to result in the following pharmacokinetic parameters:  Loading dose: 2000 mg at 12:30 10/07/2023.  Regimen: 750 mg IV every 24 hours.  Start time: 14:30 on 10/08/2023  Exposure target: AUC24 (range)400-600 mg/L.hr   AUC24,ss: 537 mg/L.hr  Probability of AUC24 > 400: 74 %  Ctrough,ss: 17.2 mg/L  Probability of Ctrough,ss > 20: 40 %  Probability of nephrotoxicity (Lodise VICTORIA 2009): 13 %    Follow-up level will be ordered on 10/8 1st AM draw unless clinically indicated sooner.  Will continue to monitor renal function daily while on vancomycin and order serum creatinine at least every 48 hours if not already ordered.  Follow for continued vancomycin needs, clinical response, and signs/symptoms of  toxicity.       Chandni Solano, PharmD  PGY-1 Pharmacy Resident

## 2023-10-07 NOTE — CARE PLAN
Problem: Skin  Goal: Prevent/manage excess moisture  Outcome: Progressing  Flowsheets (Taken 10/7/2023 1035)  Prevent/manage excess moisture:   Cleanse incontinence/protect with barrier cream   Moisturize dry skin   Follow provider orders for dressing changes   The patient's goals for the shift include      The clinical goals for the shift include to become hemodynamically stable    Over the shift, the patient did not make progress toward the following goals. Barriers to progression include remains incont.. Recommendations to address these barriers include cont. To clean.

## 2023-10-07 NOTE — CONSULTS
Consults  Referred by Dr Ashton    Primary MD: No Assigned PCP Generic Provider, MD    Reason For Consult  Colitis / cellulitis    History Of Present Illness  Keisha Santa is a 62 y.o. female, hx of .obesity, hx of MUKESH, hx of COPD, hx od HTN, hx of DM, hx of CVA, hx of alcoholism, hx of legs stasis, hx of diarrhea for few months, she was admitted for worsening diarrhea x 2 weeks, abdominal pain, nausea, weakness, dizziness, loss of appetite, altered mentations, her BP was low she needed pressors, the WBC and Cr were up, the ct showed fluids in the colon, atelectasis, she was admitted and treated with antibiotics, hx of recent treatment for UTI, no emesis, minimal cough, no chest pain, no dysuria, no drainage from the legs     Past Medical History  She has a past medical history of CHF (congestive heart failure) (CMS/formerly [facility]), COPD (chronic obstructive pulmonary disease) (CMS/formerly [facility]), Diabetes mellitus (CMS/formerly [facility]), Hypertension, and Stroke (CMS/formerly [facility]).    Surgical History  She has no past surgical history on file.     Social History     Occupational History    Not on file   Tobacco Use    Smoking status: Former     Types: Cigarettes    Smokeless tobacco: Never   Vaping Use    Vaping Use: Every day   Substance and Sexual Activity    Alcohol use: Yes     Comment: 1 a day    Drug use: Never    Sexual activity: Not on file     Travel History   Travel since 09/07/23        Location Start Date End Date     [place] 09/06/23 (defaulted) 10/06/23 (defaulted)                Family History  No family history on file.  Allergies  Shellfish containing products, Shellfish derived, Penicillin g, and Sulfa (sulfonamide antibiotics)     Immunization History   Administered Date(s) Administered    Moderna SARS-CoV-2 Vaccination 03/31/2021, 04/28/2021, 01/17/2023   Pneumonia and influenza vaccines are up to date  Huffman fall risk 60, preventive protocol was implemented  Depression screen is negative  Medications  Home  medications:  Medications Prior to Admission   Medication Sig Dispense Refill Last Dose    albuterol (ProAir HFA) 90 mcg/actuation inhaler Inhale 2 puffs every 4 hours if needed for wheezing or shortness of breath.       aspirin 81 mg EC tablet Take 1 tablet (81 mg) by mouth once daily.       atorvastatin (Lipitor) 40 mg tablet 1 tablet (40 mg).       dilTIAZem CD (Cardizem CD) 240 mg 24 hr capsule 1 capsule (240 mg) once daily.       DULoxetine (Cymbalta) 60 mg DR capsule Take 1 capsule (60 mg) by mouth 2 times a day.       EPINEPHrine (EpiPen 2-Dave) 0.3 mg/0.3 mL injection syringe 1 time if needed for anaphylaxis.       fluticasone (Flonase) 50 mcg/actuation nasal spray 2 sprays by Does not apply route once daily as needed for rhinitis.       mometasone-formoterol (Dulera 200) 200-5 mcg/actuation inhaler Inhale 2 puffs 2 times a day.       montelukast (Singulair) 10 mg tablet Take 1 tablet (10 mg) by mouth once daily.       albuterol 2.5 mg /3 mL (0.083 %) nebulizer solution 3 mL (2.5 mg) every 6 hours if needed for wheezing.       amLODIPine (Norvasc) 10 mg tablet Take 1 tablet (10 mg) by mouth once daily.       bumetanide (Bumex) 2 mg tablet Take 1 tablet (2 mg) by mouth once daily.       carvedilol (Coreg) 25 mg tablet Take 1 tablet (25 mg) by mouth 2 times a day with meals.       diclofenac sodium (Voltaren) 1 % gel gel Apply 1 Application topically 3 times a day as needed.       lisinopril 40 mg tablet Take 1 tablet (40 mg) by mouth once daily.       metFORMIN (Glucophage) 500 mg tablet Take 1 tablet (500 mg) by mouth 2 times a day with meals.       metoclopramide (Reglan) 10 mg tablet Take 1 tablet (10 mg) by mouth 3 times a day.       spironolactone (Aldactone) 50 mg tablet Take 1 tablet (50 mg) by mouth once daily.        Current medications:  Scheduled medications  aspirin, 81 mg, oral, Daily  atorvastatin, 40 mg, oral, Daily  influenza, 0.5 mL, intramuscular, During hospitalization  fluticasone  "furoate-vilanteroL, 1 puff, inhalation, Daily  folic acid, 1 mg, oral, Daily  heparin (porcine), 7,500 Units, subcutaneous, q8h STEVE  insulin lispro, 0-10 Units, subcutaneous, TID with meals  montelukast, 10 mg, oral, Daily  multivitamin with minerals, 1 tablet, oral, Daily  nystatin, , Topical, BID  piperacillin-tazobactam, 4.5 g, intravenous, q8h  sodium chloride, 1,000 mL, intravenous, Once  [START ON 10/8/2023] vancomycin, 750 mg, intravenous, q24h      Continuous medications  norepinephrine, 0.01-1 mcg/kg/min, Last Rate: 0.02 mcg/kg/min (10/07/23 0643)      PRN medications  PRN medications: acetaminophen, albuterol, albuterol, dextrose 10 % in water (D10W), dextrose, diclofenac sodium, EPINEPHrine, fluticasone, glucagon, LORazepam **OR** LORazepam **OR** LORazepam    Review of Systems   All other systems reviewed and are negative.       Objective  Range of Vitals (last 24 hours)  Heart Rate:  []   Temp:  [36.3 °C (97.3 °F)-38.3 °C (100.9 °F)]   Resp:  [12-27]   BP: ()/()   Height:  [160 cm (5' 2.99\")-160 cm (5' 3\")]   Weight:  [108 kg (237 lb)-109 kg (241 lb 2.9 oz)]   SpO2:  [90 %-100 %]   Daily Weight  10/07/23 : 109 kg (240 lb 4.8 oz)    Body mass index is 42.58 kg/m².   Neutritional consult  Physical Exam  Constitutional:       Appearance: Normal appearance.   HENT:      Head: Normocephalic and atraumatic.      Mouth/Throat:      Mouth: Mucous membranes are moist.      Pharynx: Oropharynx is clear.   Eyes:      Pupils: Pupils are equal, round, and reactive to light.   Cardiovascular:      Rate and Rhythm: Normal rate and regular rhythm.      Heart sounds: Normal heart sounds.   Pulmonary:      Effort: Pulmonary effort is normal.      Breath sounds: Normal breath sounds.   Abdominal:      General: Abdomen is flat. Bowel sounds are normal.      Palpations: Abdomen is soft.      Comments: Mid abdomen tenderness, distended   Musculoskeletal:      Cervical back: Normal range of motion.      " "Comments: Legs edema, stasis   Neurological:      Mental Status: She is alert.          Relevant Results  Outside Hospital Results    Labs  Results from last 72 hours   Lab Units 10/07/23  1144 10/06/23  2051   WBC AUTO x10*3/uL 11.9* 27.2*   HEMOGLOBIN g/dL 10.5* 12.4   HEMATOCRIT % 31.1* 35.8*   PLATELETS AUTO x10*3/uL 400 455*   LYMPHO PCT MAN %  --  6.0   MONO PCT MAN %  --  5.0   EOSINO PCT MAN %  --  4.0     Results from last 72 hours   Lab Units 10/07/23  1144 10/07/23  0036 10/06/23  2051   SODIUM mmol/L 123* 123* 118*   POTASSIUM mmol/L 5.2 5.3 6.0*   CHLORIDE mmol/L 93* 91* 84*   CO2 mmol/L 22 23 21   BUN mg/dL 30* 39* 37*   CREATININE mg/dL 1.56* 2.04* 2.12*   GLUCOSE mg/dL 129* 154* 132*   CALCIUM mg/dL 8.6 9.1 10.2   ANION GAP mmol/L 13 14 19   EGFR mL/min/1.73m*2 38* 27* 26*   PHOSPHORUS mg/dL 4.3  --   --      Results from last 72 hours   Lab Units 10/07/23  1144 10/06/23  2051   ALK PHOS U/L  --  72   BILIRUBIN TOTAL mg/dL  --  0.5   BILIRUBIN DIRECT mg/dL  --  0.1   PROTEIN TOTAL g/dL  --  7.1   ALT U/L  --  31   AST U/L  --  49*   ALBUMIN g/dL 3.4 4.4     Estimated Creatinine Clearance: 44.8 mL/min (A) (by C-G formula based on SCr of 1.56 mg/dL (H)).  No results found for: \"CRP\", \"SEDRATE\"  No results found for: \"HIV1X2\", \"HIVCONF\", \"GWEOGR5YW\"  No results found for: \"HEPCABINIT\", \"HEPCAB\", \"HCVPCRQUANT\"  Microbiology  Reviewed  Imaging  Reviewed       Assessment/Plan     Sepsis  Abdominal pain / diarrhea  Respiratory failure / COPD / atelectasis  Legs stasis  Encephalopathy    Recommendations :  Continue Zosyn and Vancomycin  Cultures  Stool studies including ova and parasites  Chest PT  Follow up the WBC    I spent  minutes in the professional and overall care of this patient.      Lizbeth Garcia MD  "

## 2023-10-07 NOTE — ED TRIAGE NOTES
"Pt to ED via EMS for reports of AMS, diarrhea, lower abd pain. EKG in route met criteria for STEMI.  EKG repeated on arrival, no ST elevation. Pt hx of CVA, DM.  in route. EMS states pt \"went unresponsive 3 times.\" Pt alert and oriented on arrival. Pt just traveled from [place] yesterday.   "

## 2023-10-07 NOTE — PROGRESS NOTES
Keisha Santa is a 62 y.o. female on day 0 of admission with PMH of HTN, HLD, T2DM, MUKESH non-compliant with CPAP, COPD, Parkinsonism, cervical cancer, 2 CVAs, HFpEF w/ EF 65%, PVD, chronic diarrhea, and chronic alcoholism who is admitted to the ICU for septic shock 2/2 colitis, cellulitis, and possible UTI.     Subjective   This morning on assessment, the patient was more alert and oriented. Family was in the room, discussed case and treatment plan. Patient continues to require Levophed. She received a 500 mL NS bolus in the morning, and received another 1L NS bolus in the afternoon. C. Diff negative.      Objective     Last Recorded Vitals  BP (!) 88/48   Pulse (!) 111   Temp 38.3 °C (100.9 °F) (Temporal)   Resp 18   Wt 109 kg (240 lb 4.8 oz)   SpO2 96%   Intake/Output last 3 Shifts:    Intake/Output Summary (Last 24 hours) at 10/7/2023 1632  Last data filed at 10/7/2023 1500  Gross per 24 hour   Intake 2350 ml   Output 3085 ml   Net -735 ml       Admission Weight  Weight: 109 kg (241 lb 2.9 oz) (Simultaneous filing. User may not have seen previous data.) (10/07/23 0430)    Daily Weight  10/07/23 : 109 kg (240 lb 4.8 oz)    Image Results  CT chest abdomen pelvis wo IV contrast  Narrative: Interpreted By:  Nomi Cruz,   STUDY:  CT CHEST ABDOMEN PELVIS WO CONTRAST;  10/6/2023 10:47 pm      INDICATION:  Signs/Symptoms:To be, shock, likely septic shock, renal failure,  severe lower quadrant abdominal pain, diarrhea for 1-month.      COMPARISON:  None.      ACCESSION NUMBER(S):  [id number]      ORDERING CLINICIAN:  LUCHO MOODY      TECHNIQUE:  CT of the chest, abdomen and pelvis was performed. Contiguous axial  images were obtained at 3 mm slice thickness through the chest,  abdomen and pelvis. Coronal and sagittal reconstructions at 3 mm  slice thickness were performed.  No intravenous or oral contrast  agents were administered.      FINDINGS:  Please note that the study is limited without  intravenous contrast.      CHEST:          LUNG/PLEURA/LARGE AIRWAYS:  Trachea and central airways are patent, without evidence of  endobronchial lesion. Small amount of mucus is present in the trachea  and distal right mainstem bronchus.      Area of bandlike atelectasis is present in the right lower lobe,  otherwise the lungs are well aerated without evidence of focal  consolidation, pleural effusion or pneumothorax. Additional small  area of atelectasis is present in the right middle lobe, likely due  to asymmetric elevation of the right diaphragm.      VESSELS:  Aorta and pulmonary arteries are normal caliber.  Mild-to-moderate  vascular calcifications are present in the thoracic aorta  mild-to-moderate coronary artery calcifications are present.      HEART:  Heart is normal in size. Small pericardial effusion is present.      MEDIASTINUM AND BART:  No mediastinal, hilar or axillary lymph nodes are present.  Esophagus  is unremarkable in appearance.      CHEST WALL AND LOWER NECK:  Soft tissues of the chest wall do not demonstrate any acute  abnormality. Thyroid is unremarkable in appearance.      ABDOMEN:      LIVER:  Within limits of noncontrast exam, the liver is unremarkable in  appearance.      BILE DUCTS:  No intrahepatic biliary dilatation is present, with mild dilatation  of the common bile duct likely due to previous cholecystectomy.      GALLBLADDER:  Gallbladder is surgically absent.      PANCREAS:  No pancreatic ductal dilatation or peripancreatic stranding is  evident.      SPLEEN:  Spleen is unremarkable in appearance.      ADRENAL GLANDS:  Bilateral adrenal glands are unremarkable.      KIDNEYS AND URETERS:  Kidneys are symmetric in size without evidence of hydronephrosis or  nephrolithiasis bilaterally. Ureters are not dilated.      PELVIS:      BLADDER:  Bladder is decompressed with Lord catheter in place.      REPRODUCTIVE ORGANS:  No suspicious adnexal masses or fluid collections are  identified.      BOWEL:  Stomach is distended with food and water debris and is otherwise  unremarkable. Small bowel is not dilated. Large bowel is fluid-filled  without evidence of abnormal inflammatory wall thickening.      VESSELS:  There is no aneurysmal dilatation of the abdominal aorta. The IVC is  within normal limits. Extensive vascular calcifications are present  in the abdominal aorta and iliac arteries bilaterally.      PERITONEUM/RETROPERITONEUM/LYMPH NODES:  There is no free or loculated fluid collection, no free  intraperitoneal air.  The retroperitoneum appears unremarkable.  No  abdominopelvic lymphadenopathy is present.      ABDOMINAL WALL:  Two 2.5 cm linear structures (series 206, image 68) are present in  the cutaneous tissues overlying the posterior elements of L1, with  mild surrounding granulation/scar tissue. Several additional punctate  calcific densities are present in the cutaneous tissues of the lower  right back at the level of L3-L4 (series 201, image 139) with mild  surrounding granulation tissue/scarring.      Cutaneous tissues of the abdominal wall are otherwise unremarkable in  appearance without evidence of fluid collections or soft tissue gas.      BONES:  There is mild dextroscoliotic curvature of the thoracolumbar spine  with multilevel degenerative changes present in the lumbar spine due  to combination of endplate spurring, hypertrophic facet changes and  ligamentum flavum thickening, with likely at least mild-to-moderate  stenosis present at the levels of L3-L4, L4-L5 and L5-S1.      Postsurgical changes of ACDF of the lower cervical spine are  partially visualized.      Impression: 1. Large bowel is fluid-filled, without evidence of associated  inflammatory wall thickening. Correlate with clinical symptoms of  colitis.  2. No additional acute infectious/inflammatory changes are present in  the abdomen and pelvis.  3. Small pericardial effusion.  4. Two 2.5 cm linear  structures are present in the cutaneous tissues  overlying the posterior elements of L1, with mild surrounding  scarring/granulation tissue, with several additional punctate  densities present in the more superficial cutaneous tissues of the  right lower back with surrounding granulation/scarring. Correlate  with surgical history.          MACRO:  None      Signed by: Nomi Cruz 10/6/2023 11:08 PM  Dictation workstation:   [id number]  XR chest 1 view  Narrative: Interpreted By:  Ihsan Allen,   STUDY:  XR CHEST 1 VIEW;  10/6/2023 9:39 pm      INDICATION:  Signs/Symptoms:hypoxia.      COMPARISON:  None.      ACCESSION NUMBER(S):  [id number]      ORDERING CLINICIAN:  LUCHO MOODY      FINDINGS:  AP radiograph of the chest was provided.      Limited by portable technique and patient soft tissue attenuation  factors. Leads overlie the chest, partially obscuring the  field-of-view.      CARDIOMEDIASTINAL SILHOUETTE:  Cardiac silhouette is normal size. Atherosclerotic calcification of  the aortic arch.      LUNGS:  Lungs are clear. No pleural effusion or pneumothorax. Asymmetric  elevation of the right hemidiaphragm.      ABDOMEN:  No remarkable upper abdominal findings.      BONES:  No acute osseous changes.      Impression: 1.  No evidence of acute cardiopulmonary process.              MACRO:  None      Signed by: Ihsan Allen 10/6/2023 9:47 PM  Dictation workstation:   [id number]      Physical Exam  Vitals reviewed.   Constitutional:       General: She is not in acute distress.     Appearance: She is morbidly obese.   Cardiovascular:      Rate and Rhythm: Regular rhythm. Tachycardia present.      Pulses: Normal pulses.      Heart sounds: Normal heart sounds. No murmur heard.  Pulmonary:      Effort: Pulmonary effort is normal. No respiratory distress.      Breath sounds: Wheezing (Mild, scattered wheezes) present.   Abdominal:      General: Bowel sounds are normal.      Tenderness: There is abdominal  tenderness (Tenderness to palpation elicited predominantly in periumbilical region and suprapubic region). There is no guarding or rebound.   Musculoskeletal:      Right lower leg: Edema present.      Left lower leg: Edema present.   Skin:     Capillary Refill: Capillary refill takes 2 to 3 seconds.      Comments: Large healing ulcer noted in RLE. In posterior left leg, mild site of erythema noted.   Neurological:      General: No focal deficit present.      Mental Status: She is alert.      Motor: Weakness present.      Comments: A and O x 2-3       Relevant Results  Scheduled medications  aspirin, 81 mg, oral, Daily  atorvastatin, 40 mg, oral, Daily  influenza, 0.5 mL, intramuscular, During hospitalization  fluticasone furoate-vilanteroL, 1 puff, inhalation, Daily  folic acid, 1 mg, oral, Daily  heparin (porcine), 7,500 Units, subcutaneous, q8h STEVE  insulin lispro, 0-10 Units, subcutaneous, TID with meals  montelukast, 10 mg, oral, Daily  multivitamin with minerals, 1 tablet, oral, Daily  nystatin, , Topical, BID  piperacillin-tazobactam, 4.5 g, intravenous, q8h  sodium chloride, 1,000 mL, intravenous, Once  [START ON 10/8/2023] vancomycin, 750 mg, intravenous, q24h      Continuous medications  norepinephrine, 0.01-1 mcg/kg/min, Last Rate: 0.02 mcg/kg/min (10/07/23 0643)      PRN medications  PRN medications: acetaminophen, albuterol, albuterol, dextrose 10 % in water (D10W), dextrose, diclofenac sodium, EPINEPHrine, fluticasone, glucagon, LORazepam **OR** LORazepam **OR** LORazepam    Results for orders placed or performed during the hospital encounter of 10/07/23 (from the past 24 hour(s))   POCT GLUCOSE   Result Value Ref Range    POCT Glucose 145 (H) 74 - 99 mg/dL   CBC   Result Value Ref Range    WBC 11.9 (H) 4.4 - 11.3 x10*3/uL    nRBC 0.0 0.0 - 0.0 /100 WBCs    RBC 3.35 (L) 4.00 - 5.20 x10*6/uL    Hemoglobin 10.5 (L) 12.0 - 16.0 g/dL    Hematocrit 31.1 (L) 36.0 - 46.0 %    MCV 93 80 - 100 fL    MCH 31.3  26.0 - 34.0 pg    MCHC 33.8 32.0 - 36.0 g/dL    RDW 14.3 11.5 - 14.5 %    Platelets 400 150 - 450 x10*3/uL    MPV 9.3 7.5 - 11.5 fL   Renal Function Panel   Result Value Ref Range    Glucose 129 (H) 74 - 99 mg/dL    Sodium 123 (L) 136 - 145 mmol/L    Potassium 5.2 3.5 - 5.3 mmol/L    Chloride 93 (L) 98 - 107 mmol/L    Bicarbonate 22 21 - 32 mmol/L    Anion Gap 13 10 - 20 mmol/L    Urea Nitrogen 30 (H) 6 - 23 mg/dL    Creatinine 1.56 (H) 0.50 - 1.05 mg/dL    eGFR 38 (L) >60 mL/min/1.73m*2    Calcium 8.6 8.6 - 10.3 mg/dL    Phosphorus 4.3 2.5 - 4.9 mg/dL    Albumin 3.4 3.4 - 5.0 g/dL   Magnesium   Result Value Ref Range    Magnesium 1.57 (L) 1.60 - 2.40 mg/dL   Blood Gas Venous Full Panel Unsolicited   Result Value Ref Range    POCT pH, Venous 7.31 (L) 7.33 - 7.43 pH    POCT pCO2, Venous 35 (L) 41 - 51 mm Hg    POCT pO2, Venous 41 35 - 45 mm Hg    POCT SO2, Venous 67 45 - 75 %    POCT Oxy Hemoglobin, Venous 65.7 45.0 - 75.0 %    POCT Hematocrit Calculated, Venous 28.0 (L) 36.0 - 46.0 %    POCT Sodium, Venous 125 (L) 136 - 145 mmol/L    POCT Potassium, Venous 4.2 3.5 - 5.3 mmol/L    POCT Chloride, Venous 100 98 - 107 mmol/L    POCT Ionized Calicum, Venous 0.97 (L) 1.10 - 1.33 mmol/L    POCT Glucose, Venous 178 (H) 74 - 99 mg/dL    POCT Lactate, Venous 1.4 0.4 - 2.0 mmol/L    POCT Base Excess, Venous -7.9 (L) -2.0 - 3.0 mmol/L    POCT HCO3 Calculated, Venous 17.6 (L) 22.0 - 26.0 mmol/L    POCT Hemoglobin, Venous 9.3 (L) 12.0 - 16.0 g/dL    POCT Anion Gap, Venous 12.0 10.0 - 25.0 mmol/L    Patient Temperature 37.0 degrees Celsius    Apparatus CANNULA     Flow 2.0 LPM   POCT GLUCOSE   Result Value Ref Range    POCT Glucose 126 (H) 74 - 99 mg/dL       Assessment/Plan   This patient has a urinary catheter   Reason for the urinary catheter remaining today? critically ill patient who need accurate urinary output measurements    Keisha Arina is a 62 y.o. female on day 0 of admission with PMH of HTN, HLD, T2DM, MUKESH  non-compliant with CPAP, COPD, Parkinsonism, cervical cancer, 2 CVAs, HFpEF w/ EF 65%, chronic diarrhea, PVD, and chronic alcoholism who is admitted to the ICU for septic shock 2/2 colitis, cellulitis, and possible UTI.      Neurology:  #Acute metabolic encephalopathy likely 2/2 sepsis (improving)  - Upon reassessment in the morning, patient remains A and O x 2-3, improving from admission  - Anticipate improvement in mental status with current medical management     #Chronic alcoholism   - CIWA protocol in place  - Continuing folic acid and multivitamin     #Parkinsonism:  - Patient continues to show tremors, however the patient is not on any home regimen for Parkinson's disease. Will continue to monitor.     Cardiovascular:  #Septic Shock 2/2 Colitis, possible cellulitis and UTI  - Lactate on most recent VBG 1.4 and stable  - Patient continues to require Levophed. Goal MAP>65  - Fluid boluses PRN     #HTN/CHF   - Holding home HTN and CHF meds: Per chart review, patient recently filled a prescription for carvedilol 25 mg, amlodipine 10 mg, and spironolactone 50 mg. She used to take diltiazem, bumetanide, and lisinopril in March 2022  - Repeat echo ordered and pending. Patient most recently had an echo in May 2023, however no EF is reported. Most recent EF on file is 65% in May 2018.     #HLD/ CVA  - Continue ASA 81 mg and atorvastatin 40 mg daily.        Pulmonary:  #Acute hypoxic respiratory failure 2/2 sepsis and possible COPD exacerbation (resolving)  - Patient currently on room air, satting 95%, titrate O2 as needed.  - Continue home albuterol inhalers PRN, Breo Ellipta scheduled, montelukast 10 mg.      Gastrointestinal:  #Chronic diarrhea:  #Colitis, rule out C. diff:  - C. Diff negative.   - Stool PCR, calprotectin, cyclospora pending    #Elevated lipase, concern for acute pancreatitis  - Lipase level 155 on admission  - CT abdomen/pelvis did not show concerning signs of pancreatitis  - Fecal elastase  pending     Renal/Urology:  #ITALO 2/2 sepsis and hypovolemia in the setting of chronic diarrhea and decreased PO intake:   - Currently resolving, most recent BUN/Cr 30/1.56 w/ GFR 28  - Continue current management.  -Monitor with a.m. lab, avoid nephrotoxic medications.     #Hyponatremia:  - Patient's most recent sodium is 123 s/p 500 mL bolus this morning  - Ordering repeat RFP for 7 PM after another 1L NS bolus this evening.  - Avoid rapid correction no more than 8 points per day     #Metabolic acidosis (resolving)   - On admission VBG 7.28/39/29/18  - Repeat VBG showed pH 7.31/pCO2 35/pO2 41/Bicarb 17.6/Lactate 1.4  - Continuing management    #Hyperkalemia (Resolved)  - Most recent K+ 5.2 and stable, anticipate potassium levels remaining within normal limits with improvement of kidney function     Hematology/Oncology:  #Leukocytosis 2/2 sepsis  - Continue current management, monitor WBC count     Infectious Disease:  #Septic shock 2/2 colitis, cellulitis, possible UTI  - Discontinued ceftriaxone and metronidazole (10/7 - 10/7)  - Due to concern for C. Diff, patient was started on PO vancomycin  - PO vanc discontinued due to C. Diff being negative  - Starting IV vancomycin and Zosyn (10/7 - )  - ID consulted, appreciate recs  - Wound care consulted, appreciate recs  - Blood and urine cultures pending     Endocrine:  #T2DM  - Mild SSI      Musculoskeletal/Skin:  - See ID section     Psychiatric:  - No active issues.        Fluids: PO PRN IV  Electrolytes: Replete as needed.  Nutrition: Diabetic and 2 gram Na  GI prophylaxis: None  VTE prophylaxis: Heparin subcutaneous   Abx: Ceftriaxone and metronidazole (10/7 - 10/7). PO vancomycin (10/7 - 10/7). IV vancomycin and Zosyn (10/7 - )  Code Status: DNR, needs to be revised with the patient and family when she improves.  Lines/Tubes: PIV      Disposition: Patient admitted with septic shock likely secondary to colitis. On Vanc/Zosyn. Continues to require low dose  Levophed. Patient will remain in ICU.    Noel Orozco MD

## 2023-10-07 NOTE — ED PROVIDER NOTES
Department of Emergency Medicine   ED  Provider Note  Admit Date/RoomTime: 10/6/2023  8:35 PM  ED Room: 06/[facility]                  History of Present Illness:   Keisha Santa is a 62 y.o. female presenting to the ED for altered mental status, feeling like she is going to pass out, abdominal pain, nausea, generalized malaise fatigue and feeling unwell, beginning a month prior, worsening diarrhea for the last 2 weeks as well as progressively worsening of lightheadedness, generalized malaise fatigue abdominal pain nausea decreased appetite for the last 2 to 3 days..  The complaint has been persistent, severe in severity, and worsened by nothing.  Patient is a 62-year-old female with past medical history notable for hypertension, history of diabetes on metformin type II insulin dependence, as well as a history of allergies and nonspecific mood disorder presents to the ER for the aforementioned symptoms mainly having sensation of impending doom severe abdominal pain lower abdominal area progressively worsening diarrhea, some nausea decreased appetite and not feeling well progressively getting worse over the last 2 to 3 days.  Patient denies any chest pain or shortness of breath.  Patient did have almost near or full syncopal episode in route to the ED by EMS.  Patient was noted to have a heart rate of 109 in route to the ED, blood pressure softer with a systolic of 100 diastolic of 50.  Patient is tachypneic breathing 24 times a minute as well as hypoxic saturating 89% on room air.  Patient not febrile.  Patient is cool and clammy.      Review of Systems:   Pertinent positives and review of systems as noted above.  Remaining 10 review of systems is negative or noncontributory to today's episode of care.        --------------------------------------------- PAST HISTORY ---------------------------------------------  Past Medical History:  has a past medical history of CHF (congestive heart failure) (CMS/[facility]), COPD  (chronic obstructive pulmonary disease) (CMS/[facility]), Diabetes mellitus (CMS/[facility]), Hypertension, and Stroke (CMS/[facility]).    Past Surgical History:  has no past surgical history on file.    Social History:  reports that she has quit smoking. Her smoking use included cigarettes. She has never used smokeless tobacco. She reports current alcohol use. She reports that she does not use drugs.    Family History: family history is not on file. Unless otherwise noted, family history is non contributory    The patient’s home medications have been reviewed.    Allergies: Penicillin g and Sulfa (sulfonamide antibiotics)    -------------------------------------------------- RESULTS -------------------------------------------------  All laboratory and radiology results have been personally reviewed by myself   LABS:  Labs Reviewed   OCCULT BLOOD X1, STOOL - Abnormal       Result Value    Occult Blood, Stool X1 Positive (*)    CBC WITH AUTO DIFFERENTIAL - Abnormal    WBC 27.2 (*)     nRBC 0.0      RBC 3.82 (*)     Hemoglobin 12.4      Hematocrit 35.8 (*)     MCV 94      MCH 32.5      MCHC 34.6      RDW 14.0      Platelets 455 (*)     MPV 8.6      Immature Granulocytes %, Automated 1.4 (*)     Immature Granulocytes Absolute, Automated 0.39      Narrative:     The previously reported component Neutrophils % is no longer being reported.  The previously reported component Lymphocytes % is no longer being reported.  The previously reported component Monocytes % is no longer being reported.  The previously   reported component Eosinophils % is no longer being reported.  The previously reported component Basophils % is no longer being reported.  The previously reported component Absolute Neutrophils is no longer being reported.  The previously reported   component Absolute Lymphocytes is no longer being reported.  The previously reported component Absolute Monocytes is no longer being reported.  The previously reported component Absolute  Eosinophils is no longer being reported.  The previously reported   component Absolute Basophils is no longer being reported.   BASIC METABOLIC PANEL - Abnormal    Glucose 132 (*)     Sodium 118 (*)     Potassium 6.0 (*)     Chloride 84 (*)     Bicarbonate 21      Anion Gap 19      Urea Nitrogen 37 (*)     Creatinine 2.12 (*)     eGFR 26 (*)     Calcium 10.2     HEPATIC FUNCTION PANEL - Abnormal    Albumin 4.4      Bilirubin, Total 0.5      Bilirubin, Direct 0.1      Alkaline Phosphatase 72      ALT 31      AST 49 (*)     Total Protein 7.1     LACTATE - Abnormal    Lactate 5.1 (*)     Narrative:     Venipuncture immediately after or during the administration of Metamizole may lead to falsely low results. Testing should be performed immediately  prior to Metamizole dosing.   LIPASE - Abnormal    Lipase 155 (*)     Narrative:     Venipuncture immediately after or during the administration of Metamizole may lead to falsely low results. Testing should be performed immediately prior to Metamizole dosing.   URINALYSIS WITH REFLEX MICROSCOPIC - Abnormal    Color, Urine Yellow      Appearance, Urine Cloudy (*)     Specific Gravity, Urine 1.020      pH, Urine 5.0      Protein, Urine 30 (1+) (*)     Glucose, Urine NEGATIVE      Blood, Urine NEGATIVE      Ketones, Urine NEGATIVE      Bilirubin, Urine NEGATIVE      Urobilinogen, Urine 2 (1+) (*)     Nitrite, Urine NEGATIVE      Leukocyte Esterase, Urine 500 Linda/µL (*)    TROPONIN I, HIGH SENSITIVITY - Abnormal    Troponin I, High Sensitivity 21 (*)     Narrative:     Less than 99th percentile of normal range cutoff-  Female and children under 18 years old <14 ng/L; Male <21 ng/L: Negative  Repeat testing should be performed if clinically indicated.     Female and children under 18 years old 14-50 ng/L; Male 21-50 ng/L:  Consistent with possible cardiac damage and possible increased clinical   risk. Serial measurements may help to assess extent of myocardial damage.     >50  ng/L: Consistent with cardiac damage, increased clinical risk and  myocardial infarction. Serial measurements may help assess extent of   myocardial damage.      NOTE: Children less than 1 year old may have higher baseline troponin   levels and results should be interpreted in conjunction with the overall   clinical context.     NOTE: Troponin I testing is performed using a different   testing methodology at [facility] than at other   [facility]. Direct result comparisons should only   be made within the same method.   BASIC METABOLIC PANEL - Abnormal    Glucose 154 (*)     Sodium 123 (*)     Potassium 5.3      Chloride 91 (*)     Bicarbonate 23      Anion Gap 14      Urea Nitrogen 39 (*)     Creatinine 2.04 (*)     eGFR 27 (*)     Calcium 9.1     POCT GLUCOSE - Abnormal    POCT Glucose 162 (*)    POCT GLUCOSE - Abnormal    POCT Glucose 127 (*)    BLOOD GAS VENOUS FULL PANEL UNSOLICITED - Abnormal    POCT pH, Venous 7.28 (*)     POCT pCO2, Venous 39 (*)     POCT pO2, Venous 38      POCT SO2, Venous 58      POCT Oxy Hemoglobin, Venous 56.4      POCT Hematocrit Calculated, Venous 37.0      POCT Sodium, Venous 116 (*)     POCT Potassium, Venous 6.3 (*)     POCT Chloride, Venous 84 (*)     POCT Ionized Calicum, Venous 1.22      POCT Glucose, Venous 139 (*)     POCT Lactate, Venous 5.4 (*)     POCT Base Excess, Venous -7.9 (*)     POCT HCO3 Calculated, Venous 18.3 (*)     POCT Hemoglobin, Venous 12.3      POCT Anion Gap, Venous 20.0      Patient Temperature 37.0      FiO2 28      Apparatus CANNULA     MANUAL DIFFERENTIAL - Abnormal    Neutrophils %, Manual 85.0      Lymphocytes %, Manual 6.0      Monocytes %, Manual 5.0      Eosinophils %, Manual 4.0      Basophils %, Manual 0.0      Seg Neutrophils Absolute, Manual 23.12 (*)     Lymphocytes Absolute, Manual 1.63      Monocytes Absolute, Manual 1.36 (*)     Eosinophils Absolute, Manual 1.09 (*)     Basophils Absolute, Manual 0.00      Total Cells  Counted 100      RBC Morphology No significant RBC morphology present     MAGNESIUM - Normal    Magnesium 2.08     PROTIME-INR - Normal    Protime 10.2      INR 0.9     SARS-COV-2 PCR, SCREEN ASYMPTOMATIC - Normal    Coronavirus 2019, PCR Not Detected      Narrative:     This assay has received FDA Emergency Use Authorization (EUA) and is only authorized for the duration of time that circumstances exist to justify the authorization of the emergency use of in vitro diagnostic tests for the detection of SARS-CoV-2 virus and/or diagnosis of COVID-19 infection under section 564(b)(1) of the Act, 21 U.S.C. 360bbb-3(b)(1). This assay is an in vitro diagnostic nucleic acid amplification test for the qualitative detection of SARS-CoV-2 from nasopharyngeal specimens and has been validated for use at [facility]. Negative results do not preclude COVID-19 infections and should not be used as the sole basis for diagnosis, treatment, or other management decisions.     B-TYPE NATRIURETIC PEPTIDE - Normal    BNP 17      Narrative:        <100 pg/mL - Heart failure unlikely  100-299 pg/mL - Intermediate probability of acute heart                  failure exacerbation. Correlate with clinical                  context and patient history.    >=300 pg/mL - Heart Failure likely. Correlate with clinical                  context and patient history.    BNP testing is performed using different testing methodology at [facility] than at other [facility]. Direct result comparisons should only be made within the same method.      LACTATE - Normal    Lactate 1.2      Narrative:     Venipuncture immediately after or during the administration of Metamizole may lead to falsely low results. Testing should be performed immediately  prior to Metamizole dosing.   URINE CULTURE   BLOOD CULTURE   BLOOD CULTURE   C. DIFFICILE, PCR   BLOOD GAS VENOUS FULL PANEL   BLOOD GAS LACTIC ACID, VENOUS   POCT GLUCOSE  "METER         RADIOLOGY:  Interpreted by Radiologist.  CT chest abdomen pelvis wo IV contrast   Final Result   1. Large bowel is fluid-filled, without evidence of associated   inflammatory wall thickening. Correlate with clinical symptoms of   colitis.   2. No additional acute infectious/inflammatory changes are present in   the abdomen and pelvis.   3. Small pericardial effusion.   4. Two 2.5 cm linear structures are present in the cutaneous tissues   overlying the posterior elements of L1, with mild surrounding   scarring/granulation tissue, with several additional punctate   densities present in the more superficial cutaneous tissues of the   right lower back with surrounding granulation/scarring. Correlate   with surgical history.             MACRO:   None        Signed by: Nomi Cruz 10/6/2023 11:08 PM   Dictation workstation:   [id number]      XR chest 1 view   Final Result   1.  No evidence of acute cardiopulmonary process.                  MACRO:   None        Signed by: Ihsan Allen 10/6/2023 9:47 PM   Dictation workstation:   [id number]          No results found for this or any previous visit (from the past 4464 hour(s)).  ------------------------- NURSING NOTES AND VITALS REVIEWED ---------------------------   The nursing notes within the ED encounter and vital signs as below have been reviewed.     Vital signs were remarkable for hypoxia, tachycardia, hypertension, tachypnea, currently requiring 3 to 4 L of nasal cannula to maintain saturations  /87   Pulse 109   Temp 36.3 °C (97.4 °F)   Resp 15   Ht 1.6 m (5' 3\")   Wt 108 kg (237 lb)   SpO2 98%   BMI 41.98 kg/m²   Oxygen Saturation Interpretation: Normal      ---------------------------------------------------PHYSICAL EXAM--------------------------------------    Constitutional/General:, Answer questions appropriately, no focal neurological deficit, groggy and toxic appearing, cool and clammy  Head: Normocephalic and " atraumatic  Eyes: PERRL, EOMI, conjunctiva normal, sclera non icteric  Mouth: Oropharynx clear, handling secretions, no trismus, no asymmetry of the posterior oropharynx or uvular edema, mucous membranes  Neck: Supple, full ROM, non tender to palpation in the midline, no stridor, no crepitus, no meningeal signs  Respiratory: Lungs clear to auscultation bilaterally, no wheezes, rales, or rhonchi. Not in respiratory distress, has respiratory drive however patient does have clear to auscultation lung sounds bilaterally  Cardiovascular:  Regular rhythm.  tachyCardiac no murmurs, gallops, or rubs. 1+ distal pulses  Chest: No chest wall tenderness  GI:  Abdomen Soft, tender on palpation of bilateral lower quadrants with rebound non distended.  +BS. No organomegaly, no palpable masses,  No rebound, guarding, or rigidity.   Musculoskeletal: Moves all extremities x 4. Warm and well perfused, no clubbing, cyanosis, or edema.    Integument: skin warm and dry. No rashes.   Lymphatic: no lymphadenopathy noted  Neurologic: GCS 15, no focal deficits, symmetric strength 5/5 in the upper and lower extremities bilaterally        Critical Care    Performed by: Jonah Gallegos DO  Authorized by: Jonah Gallegos DO    Critical care provider statement:     Critical care time (minutes):  45    Critical care was necessary to treat or prevent imminent or life-threatening deterioration of the following conditions:  Dehydration and shock (, Management of hyperkalemia, electrolyte abnormality, EKG changes)    Critical care was time spent personally by me on the following activities:  Discussions with consultants, evaluation of patient's response to treatment, examination of patient, interpretation of cardiac output measurements, obtaining history from patient or surrogate, review of old charts, re-evaluation of patient's condition, ordering and review of radiographic studies, pulse oximetry, ordering and review of laboratory studies and  ordering and performing treatments and interventions    Read:  EKG #1: Obtained at 20 1 read by me as it was being performed at 2037, shows a sinus tachycardia rhythm at a rate of 102 with normal OH, QRS at 94 ms QTc 417.  Patient does have right axis deviation, patient does have an R wave in lead I, patient does have Q waves in lead III, patient does have peaking of all T waves consistent with either early STEMI or hyperkalemia which her case is hyperkalemia.  Patient does not have any significant ST depressions or any reciprocal or contiguous elevation depressions    ED read #2: Obtained at 2039 upright with squeezing her fist to increase afterload  Rhythm: Sinus  Rate: 103,  Intervals: OH QRS QTc intervals are within normal limits.  ST segments: Roughly within normal limits with some ST elevations that are noted in leads II as well as aVF otherwise no reciprocal changes noted  T waves: Peaked T waves throughout all leads limb leads in addition to precordial leads  Axis: Right axis deviated    ------------------------------ ED COURSE/MEDICAL DECISION MAKING----------------------    Medical Decision Making:   Patient examined in the ER.  Patient was found to be in significant distress diaphoretic cool clammy extremities.  However patient was also having issues with intermittent near syncopal episode in route to the ED.  EKG is obtained on this patient initial arrival does not show peaked T waves as I was concerned regarding possibly having an early MI versus having hyperkalemia.  Patient received intravenous IV fluid with normal saline initially with fluid boluses clinically she does appear to be moderately severely dehydrated.  Venous full panel obtained on this patient does show a significant acidosis with a pH of 7.27 in addition to having a lactate of 5 as well as potassium is elevated to 6 with a very low serum sodium as well as low bicarb at 18.  Patient presentation most consistent with acute renal failure  which appears from primary clinical presentation to be prerenal at this point.  Patient in total received 2-1/2 L of IV fluid at our facility.  Patient was noted to have a white count of 27 with a left shift as well as an elevated lactate at greater than 5 which down trended to less than 2 with IV fluid hydration.  Patient continued to be hypotensive requiring pressors to be administered blood which I suspect is likely secondary to sepsis or significant dehydration from her colitis that was related discovered on her CT of her abdomen pelvis.  Patient has been on antibiotic recently as well and this may be secondary to C. difficile colitis and or any sort of bacterial or infectious diarrhea.  Patient was provided with broad-spectrum antibiotics as well.  Patient continues to be in shock state as patient continues to require pressor at 0.06 mics per kilogram per minute at the time of transfer.  Patient is making adequate MAP of 65 at this point.  Cap refill was less than 2 seconds on reevaluation performed at 2:30 AM on October 7, 2023.  Blood cultures as well as urine culture was obtained.  Patient case was discussed with admitting physician at [facility] who accepted this patient for further evaluation management of her nonspecific shock, severe dehydration, colitis, sepsis or possibly septic shock as well as electrolytes abnormality with low sodium hyponatremia as well as hyperkalemia and acute renal failure.    Diagnoses as of 10/07/23 0446   Shock (CMS/HCC)   Near syncope   Severe dehydration   Sepsis, due to unspecified organism, unspecified whether acute organ dysfunction present (CMS/HCC)   Diarrhea, unspecified type   Rectal bleeding   Acute kidney injury (CMS/[facility])   Hyponatremia   Hyperkalemia      Counseling:   The emergency provider has spoken with the patient and discussed today’s results, in addition to providing specific details for the plan of care and counseling regarding the diagnosis and  prognosis.  Questions are answered at this time and they are agreeable with the plan.      --------------------------------- IMPRESSION AND DISPOSITION ---------------------------------    Diagnoses as of 10/07/23 0446   Shock (CMS/[facility])   Near syncope   Severe dehydration   Sepsis, due to unspecified organism, unspecified whether acute organ dysfunction present (CMS/[facility])   Diarrhea, unspecified type   Rectal bleeding   Acute kidney injury (CMS/[facility])   Hyponatremia   Hyperkalemia        IMPRESSION  1. Shock (CMS/[facility])    2. Near syncope    3. Severe dehydration    4. Sepsis, due to unspecified organism, unspecified whether acute organ dysfunction present (CMS/[facility])    5. Diarrhea, unspecified type    6. Rectal bleeding    7. Acute kidney injury (CMS/[facility])    8. Hyponatremia    9. Hyperkalemia        DISPOSITION  Disposition: transfer to [facility] ICU  Patient condition is fair         Jonah Gallegos DO  10/07/23 0446

## 2023-10-07 NOTE — CARE PLAN
The patient's goals for the shift include      The clinical goals for the shift include to become hemodynamically stable    Over the shift, the patient did not make progress toward the following goals. Barriers to progression include cont. levo. Recommendations to address these barriers include cont. To maintain .

## 2023-10-08 ENCOUNTER — APPOINTMENT (OUTPATIENT)
Dept: RADIOLOGY | Facility: HOSPITAL | Age: 63
End: 2023-10-08
Payer: MEDICAID

## 2023-10-08 LAB
ALBUMIN SERPL BCP-MCNC: 2.6 G/DL (ref 3.4–5)
ALP SERPL-CCNC: 40 U/L (ref 33–136)
ALT SERPL W P-5'-P-CCNC: 15 U/L (ref 7–45)
ANION GAP SERPL CALC-SCNC: 8 MMOL/L (ref 10–20)
AST SERPL W P-5'-P-CCNC: 21 U/L (ref 9–39)
BACTERIA UR CULT: NO GROWTH
BILIRUB SERPL-MCNC: 0.3 MG/DL (ref 0–1.2)
BUN SERPL-MCNC: 12 MG/DL (ref 6–23)
CALCIUM SERPL-MCNC: 7.3 MG/DL (ref 8.6–10.3)
CHLORIDE SERPL-SCNC: 108 MMOL/L (ref 98–107)
CO2 SERPL-SCNC: 20 MMOL/L (ref 21–32)
CORTIS AM PEAK SERPL-MSCNC: 17 UG/DL (ref 5–20)
CREAT SERPL-MCNC: 0.77 MG/DL (ref 0.5–1.05)
ERYTHROCYTE [DISTWIDTH] IN BLOOD BY AUTOMATED COUNT: 14.6 % (ref 11.5–14.5)
GFR SERPL CREATININE-BSD FRML MDRD: 88 ML/MIN/1.73M*2
GLUCOSE BLD MANUAL STRIP-MCNC: 113 MG/DL (ref 74–99)
GLUCOSE BLD MANUAL STRIP-MCNC: 149 MG/DL (ref 74–99)
GLUCOSE BLD MANUAL STRIP-MCNC: 97 MG/DL (ref 74–99)
GLUCOSE SERPL-MCNC: 123 MG/DL (ref 74–99)
HCT VFR BLD AUTO: 25.7 % (ref 36–46)
HGB BLD-MCNC: 8.3 G/DL (ref 12–16)
LACTATE SERPL-SCNC: 1.4 MMOL/L (ref 0.4–2)
MAGNESIUM SERPL-MCNC: 1.9 MG/DL (ref 1.6–2.4)
MCH RBC QN AUTO: 31.9 PG (ref 26–34)
MCHC RBC AUTO-ENTMCNC: 32.3 G/DL (ref 32–36)
MCV RBC AUTO: 99 FL (ref 80–100)
NRBC BLD-RTO: 0 /100 WBCS (ref 0–0)
PHOSPHATE SERPL-MCNC: 2.3 MG/DL (ref 2.5–4.9)
PLATELET # BLD AUTO: 284 X10*3/UL (ref 150–450)
PMV BLD AUTO: 8.7 FL (ref 7.5–11.5)
POTASSIUM SERPL-SCNC: 4 MMOL/L (ref 3.5–5.3)
PROT SERPL-MCNC: 4.5 G/DL (ref 6.4–8.2)
RBC # BLD AUTO: 2.6 X10*6/UL (ref 4–5.2)
SODIUM SERPL-SCNC: 132 MMOL/L (ref 136–145)
VANCOMYCIN SERPL-MCNC: 8.7 UG/ML (ref 5–20)
WBC # BLD AUTO: 9.3 X10*3/UL (ref 4.4–11.3)

## 2023-10-08 PROCEDURE — 96372 THER/PROPH/DIAG INJ SC/IM: CPT | Performed by: INTERNAL MEDICINE

## 2023-10-08 PROCEDURE — 74176 CT ABD & PELVIS W/O CONTRAST: CPT

## 2023-10-08 PROCEDURE — 97535 SELF CARE MNGMENT TRAINING: CPT | Mod: GO

## 2023-10-08 PROCEDURE — 87081 CULTURE SCREEN ONLY: CPT | Mod: CMCLAB,GEALAB

## 2023-10-08 PROCEDURE — 80053 COMPREHEN METABOLIC PANEL: CPT

## 2023-10-08 PROCEDURE — 2500000004 HC RX 250 GENERAL PHARMACY W/ HCPCS (ALT 636 FOR OP/ED): Performed by: INTERNAL MEDICINE

## 2023-10-08 PROCEDURE — 2580000001 HC RX 258 IV SOLUTIONS

## 2023-10-08 PROCEDURE — 2500000004 HC RX 250 GENERAL PHARMACY W/ HCPCS (ALT 636 FOR OP/ED)

## 2023-10-08 PROCEDURE — 2020000001 HC ICU ROOM DAILY

## 2023-10-08 PROCEDURE — 2500000005 HC RX 250 GENERAL PHARMACY W/O HCPCS

## 2023-10-08 PROCEDURE — 83735 ASSAY OF MAGNESIUM: CPT

## 2023-10-08 PROCEDURE — 97165 OT EVAL LOW COMPLEX 30 MIN: CPT | Mod: GO

## 2023-10-08 PROCEDURE — 2500000001 HC RX 250 WO HCPCS SELF ADMINISTERED DRUGS (ALT 637 FOR MEDICARE OP)

## 2023-10-08 PROCEDURE — 74018 RADEX ABDOMEN 1 VIEW: CPT | Performed by: RADIOLOGY

## 2023-10-08 PROCEDURE — 84100 ASSAY OF PHOSPHORUS: CPT

## 2023-10-08 PROCEDURE — 82947 ASSAY GLUCOSE BLOOD QUANT: CPT

## 2023-10-08 PROCEDURE — 85027 COMPLETE CBC AUTOMATED: CPT

## 2023-10-08 PROCEDURE — 97530 THERAPEUTIC ACTIVITIES: CPT | Mod: GP

## 2023-10-08 PROCEDURE — 97161 PT EVAL LOW COMPLEX 20 MIN: CPT | Mod: GP

## 2023-10-08 PROCEDURE — 37799 UNLISTED PX VASCULAR SURGERY: CPT

## 2023-10-08 PROCEDURE — 83605 ASSAY OF LACTIC ACID: CPT

## 2023-10-08 PROCEDURE — 80202 ASSAY OF VANCOMYCIN: CPT

## 2023-10-08 PROCEDURE — 82533 TOTAL CORTISOL: CPT | Mod: CMCLAB,GEALAB

## 2023-10-08 PROCEDURE — 74176 CT ABD & PELVIS W/O CONTRAST: CPT | Performed by: RADIOLOGY

## 2023-10-08 PROCEDURE — 2500000001 HC RX 250 WO HCPCS SELF ADMINISTERED DRUGS (ALT 637 FOR MEDICARE OP): Performed by: INTERNAL MEDICINE

## 2023-10-08 PROCEDURE — 99291 CRITICAL CARE FIRST HOUR: CPT

## 2023-10-08 PROCEDURE — 74018 RADEX ABDOMEN 1 VIEW: CPT

## 2023-10-08 RX ORDER — OXYCODONE AND ACETAMINOPHEN 5; 325 MG/1; MG/1
1 TABLET ORAL EVERY 6 HOURS PRN
Status: DISCONTINUED | OUTPATIENT
Start: 2023-10-08 | End: 2023-10-12

## 2023-10-08 RX ORDER — VANCOMYCIN HYDROCHLORIDE 1 G/200ML
1000 INJECTION, SOLUTION INTRAVENOUS EVERY 12 HOURS
Status: DISCONTINUED | OUTPATIENT
Start: 2023-10-08 | End: 2023-10-09

## 2023-10-08 RX ORDER — ZINC OXIDE 20 G/100G
1 OINTMENT TOPICAL AS NEEDED
Status: DISCONTINUED | OUTPATIENT
Start: 2023-10-08 | End: 2023-10-16 | Stop reason: HOSPADM

## 2023-10-08 RX ORDER — DEXTROSE MONOHYDRATE 50 MG/ML
100 INJECTION, SOLUTION INTRAVENOUS CONTINUOUS
Status: DISCONTINUED | OUTPATIENT
Start: 2023-10-08 | End: 2023-10-08

## 2023-10-08 RX ORDER — SODIUM CHLORIDE 450 MG/100ML
100 INJECTION, SOLUTION INTRAVENOUS CONTINUOUS
Status: DISCONTINUED | OUTPATIENT
Start: 2023-10-08 | End: 2023-10-10

## 2023-10-08 RX ORDER — OXYCODONE HYDROCHLORIDE 10 MG/1
10 TABLET ORAL EVERY 6 HOURS PRN
Status: DISCONTINUED | OUTPATIENT
Start: 2023-10-08 | End: 2023-10-12

## 2023-10-08 RX ADMIN — LORAZEPAM 1 MG: 2 INJECTION INTRAMUSCULAR; INTRAVENOUS at 01:42

## 2023-10-08 RX ADMIN — SODIUM CHLORIDE 1000 ML: 9 INJECTION, SOLUTION INTRAVENOUS at 03:07

## 2023-10-08 RX ADMIN — OXYCODONE HYDROCHLORIDE 10 MG: 5 TABLET ORAL at 11:21

## 2023-10-08 RX ADMIN — OXYCODONE HYDROCHLORIDE 10 MG: 5 TABLET ORAL at 17:30

## 2023-10-08 RX ADMIN — SODIUM CHLORIDE 100 ML/HR: 4.5 INJECTION, SOLUTION INTRAVENOUS at 10:17

## 2023-10-08 RX ADMIN — VANCOMYCIN HYDROCHLORIDE 1000 MG: 1 INJECTION, SOLUTION INTRAVENOUS at 18:32

## 2023-10-08 RX ADMIN — ASPIRIN 81 MG: 81 TABLET, COATED ORAL at 08:36

## 2023-10-08 RX ADMIN — FLUTICASONE FUROATE AND VILANTEROL TRIFENATATE 1 PUFF: 200; 25 POWDER RESPIRATORY (INHALATION) at 07:46

## 2023-10-08 RX ADMIN — SODIUM CHLORIDE, POTASSIUM CHLORIDE, SODIUM LACTATE AND CALCIUM CHLORIDE 1000 ML: 600; 310; 30; 20 INJECTION, SOLUTION INTRAVENOUS at 22:16

## 2023-10-08 RX ADMIN — MULTIPLE VITAMINS W/ MINERALS TAB 1 TABLET: TAB at 08:36

## 2023-10-08 RX ADMIN — NYSTATIN: 100000 CREAM TOPICAL at 21:34

## 2023-10-08 RX ADMIN — HEPARIN SODIUM 7500 UNITS: 5000 INJECTION INTRAVENOUS; SUBCUTANEOUS at 08:36

## 2023-10-08 RX ADMIN — HEPARIN SODIUM 7500 UNITS: 5000 INJECTION INTRAVENOUS; SUBCUTANEOUS at 17:28

## 2023-10-08 RX ADMIN — VANCOMYCIN HYDROCHLORIDE 1000 MG: 1 INJECTION, SOLUTION INTRAVENOUS at 07:44

## 2023-10-08 RX ADMIN — PIPERACILLIN SODIUM AND TAZOBACTAM SODIUM 4.5 G: 4; .5 INJECTION, SOLUTION INTRAVENOUS at 03:55

## 2023-10-08 RX ADMIN — HEPARIN SODIUM 7500 UNITS: 5000 INJECTION INTRAVENOUS; SUBCUTANEOUS at 01:21

## 2023-10-08 RX ADMIN — PIPERACILLIN SODIUM AND TAZOBACTAM SODIUM 4.5 G: 4; .5 INJECTION, SOLUTION INTRAVENOUS at 11:55

## 2023-10-08 RX ADMIN — PIPERACILLIN SODIUM AND TAZOBACTAM SODIUM 4.5 G: 4; .5 INJECTION, SOLUTION INTRAVENOUS at 19:52

## 2023-10-08 RX ADMIN — MONTELUKAST 10 MG: 10 TABLET, FILM COATED ORAL at 08:36

## 2023-10-08 RX ADMIN — SODIUM CHLORIDE, POTASSIUM CHLORIDE, SODIUM LACTATE AND CALCIUM CHLORIDE 1000 ML: 600; 310; 30; 20 INJECTION, SOLUTION INTRAVENOUS at 13:30

## 2023-10-08 RX ADMIN — DEXTROSE MONOHYDRATE 100 ML/HR: 50 INJECTION, SOLUTION INTRAVENOUS at 06:13

## 2023-10-08 RX ADMIN — SODIUM CHLORIDE, POTASSIUM CHLORIDE, SODIUM LACTATE AND CALCIUM CHLORIDE 1000 ML: 600; 310; 30; 20 INJECTION, SOLUTION INTRAVENOUS at 07:45

## 2023-10-08 RX ADMIN — SODIUM CHLORIDE 100 ML/HR: 4.5 INJECTION, SOLUTION INTRAVENOUS at 21:35

## 2023-10-08 RX ADMIN — POTASSIUM PHOSPHATE, MONOBASIC AND POTASSIUM PHOSPHATE, DIBASIC 15 MMOL: 224; 236 INJECTION, SOLUTION, CONCENTRATE INTRAVENOUS at 08:55

## 2023-10-08 RX ADMIN — SODIUM CHLORIDE 1000 ML: 9 INJECTION, SOLUTION INTRAVENOUS at 01:49

## 2023-10-08 RX ADMIN — NYSTATIN: 100000 CREAM TOPICAL at 08:36

## 2023-10-08 RX ADMIN — ACETAMINOPHEN 650 MG: 325 TABLET ORAL at 01:23

## 2023-10-08 RX ADMIN — FOLIC ACID 1 MG: 1 TABLET ORAL at 08:36

## 2023-10-08 RX ADMIN — SODIUM CHLORIDE 1000 ML: 9 INJECTION, SOLUTION INTRAVENOUS at 03:54

## 2023-10-08 RX ADMIN — ACETAMINOPHEN 650 MG: 325 TABLET ORAL at 19:53

## 2023-10-08 SDOH — HEALTH STABILITY: MENTAL HEALTH: HOW OFTEN DO YOU HAVE 6 OR MORE DRINKS ON ONE OCCASION?: DAILY OR ALMOST DAILY

## 2023-10-08 SDOH — HEALTH STABILITY: MENTAL HEALTH: HOW MANY STANDARD DRINKS CONTAINING ALCOHOL DO YOU HAVE ON A TYPICAL DAY?: 7 TO 9

## 2023-10-08 SDOH — ECONOMIC STABILITY: HOUSING INSECURITY
IN THE LAST 12 MONTHS, WAS THERE A TIME WHEN YOU DID NOT HAVE A STEADY PLACE TO SLEEP OR SLEPT IN A SHELTER (INCLUDING NOW)?: NO

## 2023-10-08 SDOH — HEALTH STABILITY: MENTAL HEALTH: HOW OFTEN DO YOU HAVE A DRINK CONTAINING ALCOHOL?: 4 OR MORE TIMES A WEEK

## 2023-10-08 SDOH — ECONOMIC STABILITY: TRANSPORTATION INSECURITY
IN THE PAST 12 MONTHS, HAS THE LACK OF TRANSPORTATION KEPT YOU FROM MEDICAL APPOINTMENTS OR FROM GETTING MEDICATIONS?: NO

## 2023-10-08 SDOH — ECONOMIC STABILITY: TRANSPORTATION INSECURITY
IN THE PAST 12 MONTHS, HAS LACK OF TRANSPORTATION KEPT YOU FROM MEETINGS, WORK, OR FROM GETTING THINGS NEEDED FOR DAILY LIVING?: NO

## 2023-10-08 SDOH — ECONOMIC STABILITY: INCOME INSECURITY: IN THE LAST 12 MONTHS, WAS THERE A TIME WHEN YOU WERE NOT ABLE TO PAY THE MORTGAGE OR RENT ON TIME?: NO

## 2023-10-08 SDOH — ECONOMIC STABILITY: FOOD INSECURITY: WITHIN THE PAST 12 MONTHS, YOU WORRIED THAT YOUR FOOD WOULD RUN OUT BEFORE YOU GOT MONEY TO BUY MORE.: NEVER TRUE

## 2023-10-08 SDOH — ECONOMIC STABILITY: INCOME INSECURITY: IN THE PAST 12 MONTHS, HAS THE ELECTRIC, GAS, OIL, OR WATER COMPANY THREATENED TO SHUT OFF SERVICE IN YOUR HOME?: NO

## 2023-10-08 SDOH — ECONOMIC STABILITY: FOOD INSECURITY: WITHIN THE PAST 12 MONTHS, THE FOOD YOU BOUGHT JUST DIDN'T LAST AND YOU DIDN'T HAVE MONEY TO GET MORE.: NEVER TRUE

## 2023-10-08 SDOH — ECONOMIC STABILITY: INCOME INSECURITY: HOW HARD IS IT FOR YOU TO PAY FOR THE VERY BASICS LIKE FOOD, HOUSING, MEDICAL CARE, AND HEATING?: NOT HARD AT ALL

## 2023-10-08 ASSESSMENT — LIFESTYLE VARIABLES
TREMOR: NOT VISIBLE, BUT CAN BE FELT FINGERTIP TO FINGERTIP
NAUSEA AND VOMITING: NO NAUSEA AND NO VOMITING
TOTAL SCORE: 5
TOTAL SCORE: 4
AUDITORY DISTURBANCES: NOT PRESENT
PAROXYSMAL SWEATS: NO SWEAT VISIBLE
ORIENTATION AND CLOUDING OF SENSORIUM: ORIENTED AND CAN DO SERIAL ADDITIONS
TOTAL SCORE: 4
AUDITORY DISTURBANCES: NOT PRESENT
ORIENTATION AND CLOUDING OF SENSORIUM: ORIENTED AND CAN DO SERIAL ADDITIONS
ANXIETY: NO ANXIETY, AT EASE
VISUAL DISTURBANCES: NOT PRESENT
AGITATION: SOMEWHAT MORE THAN NORMAL ACTIVITY
TOTAL SCORE: 8
NAUSEA AND VOMITING: NO NAUSEA AND NO VOMITING
HEADACHE, FULLNESS IN HEAD: NOT PRESENT
TOTAL SCORE: 5
AGITATION: NORMAL ACTIVITY
AUDITORY DISTURBANCES: NOT PRESENT
PAROXYSMAL SWEATS: NO SWEAT VISIBLE
NAUSEA AND VOMITING: NO NAUSEA AND NO VOMITING
PAROXYSMAL SWEATS: NO SWEAT VISIBLE
PAROXYSMAL SWEATS: NO SWEAT VISIBLE
TOTAL SCORE: 2
HEADACHE, FULLNESS IN HEAD: NOT PRESENT
AUDITORY DISTURBANCES: NOT PRESENT
TREMOR: NOT VISIBLE, BUT CAN BE FELT FINGERTIP TO FINGERTIP
ORIENTATION AND CLOUDING OF SENSORIUM: ORIENTED AND CAN DO SERIAL ADDITIONS
PAROXYSMAL SWEATS: NO SWEAT VISIBLE
VISUAL DISTURBANCES: NOT PRESENT
AUDITORY DISTURBANCES: NOT PRESENT
AUDITORY DISTURBANCES: NOT PRESENT
HEADACHE, FULLNESS IN HEAD: NOT PRESENT
VISUAL DISTURBANCES: NOT PRESENT
HEADACHE, FULLNESS IN HEAD: NOT PRESENT
SKIP TO QUESTIONS 9-10: 0
AGITATION: SOMEWHAT MORE THAN NORMAL ACTIVITY
NAUSEA AND VOMITING: NO NAUSEA AND NO VOMITING
TREMOR: 3
ANXIETY: MILDLY ANXIOUS
NAUSEA AND VOMITING: NO NAUSEA AND NO VOMITING
AGITATION: SOMEWHAT MORE THAN NORMAL ACTIVITY
NAUSEA AND VOMITING: 2
ANXIETY: MILDLY ANXIOUS
ORIENTATION AND CLOUDING OF SENSORIUM: CANNOT DO SERIAL ADDITIONS OR IS UNCERTAIN ABOUT DATE
AGITATION: SOMEWHAT MORE THAN NORMAL ACTIVITY
ANXIETY: MILDLY ANXIOUS
VISUAL DISTURBANCES: NOT PRESENT
AGITATION: SOMEWHAT MORE THAN NORMAL ACTIVITY
AUDIT-C TOTAL SCORE: 11
TREMOR: MODERATE, WITH PATIENT'S ARMS EXTENDED
ANXIETY: MILDLY ANXIOUS
NAUSEA AND VOMITING: NO NAUSEA AND NO VOMITING
ANXIETY: MILDLY ANXIOUS
TOTAL SCORE: 3
ORIENTATION AND CLOUDING OF SENSORIUM: CANNOT DO SERIAL ADDITIONS OR IS UNCERTAIN ABOUT DATE
AUDITORY DISTURBANCES: NOT PRESENT
VISUAL DISTURBANCES: NOT PRESENT
NAUSEA AND VOMITING: NO NAUSEA AND NO VOMITING
PAROXYSMAL SWEATS: NO SWEAT VISIBLE
TREMOR: NO TREMOR
TREMOR: 3
ORIENTATION AND CLOUDING OF SENSORIUM: ORIENTED AND CAN DO SERIAL ADDITIONS
AUDITORY DISTURBANCES: NOT PRESENT
ANXIETY: MILDLY ANXIOUS
TREMOR: MODERATE, WITH PATIENT'S ARMS EXTENDED
TREMOR: 2
PULSE: 102
AGITATION: NORMAL ACTIVITY
HEADACHE, FULLNESS IN HEAD: NOT PRESENT
VISUAL DISTURBANCES: NOT PRESENT
TREMOR: 2
ANXIETY: MILDLY ANXIOUS
AUDITORY DISTURBANCES: NOT PRESENT
HEADACHE, FULLNESS IN HEAD: NOT PRESENT
PAROXYSMAL SWEATS: NO SWEAT VISIBLE
VISUAL DISTURBANCES: NOT PRESENT
ORIENTATION AND CLOUDING OF SENSORIUM: CANNOT DO SERIAL ADDITIONS OR IS UNCERTAIN ABOUT DATE
PAROXYSMAL SWEATS: NO SWEAT VISIBLE
VISUAL DISTURBANCES: NOT PRESENT
PAROXYSMAL SWEATS: NO SWEAT VISIBLE
ANXIETY: MILDLY ANXIOUS
AGITATION: SOMEWHAT MORE THAN NORMAL ACTIVITY
ORIENTATION AND CLOUDING OF SENSORIUM: ORIENTED AND CAN DO SERIAL ADDITIONS
TOTAL SCORE: 2
NAUSEA AND VOMITING: NO NAUSEA AND NO VOMITING
VISUAL DISTURBANCES: NOT PRESENT
HEADACHE, FULLNESS IN HEAD: NOT PRESENT
ORIENTATION AND CLOUDING OF SENSORIUM: ORIENTED AND CAN DO SERIAL ADDITIONS
TOTAL SCORE: 6
AGITATION: NORMAL ACTIVITY
BLOOD PRESSURE: 96/49
HEADACHE, FULLNESS IN HEAD: NOT PRESENT
HEADACHE, FULLNESS IN HEAD: NOT PRESENT

## 2023-10-08 ASSESSMENT — ACTIVITIES OF DAILY LIVING (ADL): HOME_MANAGEMENT_TIME_ENTRY: 13

## 2023-10-08 ASSESSMENT — COGNITIVE AND FUNCTIONAL STATUS - GENERAL
MOVING FROM LYING ON BACK TO SITTING ON SIDE OF FLAT BED WITH BEDRAILS: A LOT
HELP NEEDED FOR BATHING: TOTAL
MOVING TO AND FROM BED TO CHAIR: TOTAL
STANDING UP FROM CHAIR USING ARMS: TOTAL
TOILETING: A LOT
MOBILITY SCORE: 8
MOVING FROM LYING ON BACK TO SITTING ON SIDE OF FLAT BED WITH BEDRAILS: A LOT
PERSONAL GROOMING: A LITTLE
DAILY ACTIVITIY SCORE: 15
STANDING UP FROM CHAIR USING ARMS: TOTAL
CLIMB 3 TO 5 STEPS WITH RAILING: TOTAL
PERSONAL GROOMING: A LITTLE
HELP NEEDED FOR BATHING: TOTAL
CLIMB 3 TO 5 STEPS WITH RAILING: TOTAL
DAILY ACTIVITIY SCORE: 15
TOILETING: A LOT
DRESSING REGULAR UPPER BODY CLOTHING: A LOT
WALKING IN HOSPITAL ROOM: TOTAL
TURNING FROM BACK TO SIDE WHILE IN FLAT BAD: A LOT
TURNING FROM BACK TO SIDE WHILE IN FLAT BAD: A LOT
WALKING IN HOSPITAL ROOM: TOTAL
MOBILITY SCORE: 8
DRESSING REGULAR UPPER BODY CLOTHING: A LOT
DRESSING REGULAR LOWER BODY CLOTHING: A LITTLE
DRESSING REGULAR LOWER BODY CLOTHING: A LITTLE
MOVING TO AND FROM BED TO CHAIR: TOTAL

## 2023-10-08 ASSESSMENT — PAIN DESCRIPTION - DESCRIPTORS: DESCRIPTORS: SHARP

## 2023-10-08 ASSESSMENT — PAIN - FUNCTIONAL ASSESSMENT
PAIN_FUNCTIONAL_ASSESSMENT: 0-10

## 2023-10-08 ASSESSMENT — PAIN SCALES - WONG BAKER
WONGBAKER_NUMERICALRESPONSE: HURTS WHOLE LOT
WONGBAKER_NUMERICALRESPONSE: HURTS EVEN MORE

## 2023-10-08 ASSESSMENT — PAIN SCALES - GENERAL
PAINLEVEL_OUTOF10: 10 - WORST POSSIBLE PAIN
PAINLEVEL_OUTOF10: 10 - WORST POSSIBLE PAIN
PAINLEVEL_OUTOF10: 7
PAINLEVEL_OUTOF10: 10 - WORST POSSIBLE PAIN

## 2023-10-08 NOTE — CARE PLAN
Problem: Mobility  Goal: STG - Patient will ambulate at least 25' with FWW CGA  Outcome: Progressing     Problem: Transfers  Goal: STG - Patient to transfer to and from sit to supine with CGAx1  Outcome: Progressing  Goal: STG - Patient will perform bed mobility with SBA  Outcome: Progressing  Goal: STG - Patient will roll SBA  Outcome: Progressing  Goal: STG - Patient will transfer sit to and from stand CGAx1 with FWW  Outcome: Progressing

## 2023-10-08 NOTE — PROGRESS NOTES
"Vancomycin Dosing by Pharmacy- FOLLOW UP    Keisha Santa is a 62 y.o. year old female who Pharmacy has been consulted for vancomycin dosing for other sepsis . Based on the patient's indication and renal status this patient is being dosed based on a goal AUC of 400-600.     Renal function is currently improving.    Current vancomycin dose: 750 mg given every 24 hours    Estimated vancomycin AUC on current dose: 213 mg/L.hr     Visit Vitals  BP 99/52   Pulse (!) 113   Temp 37 °C (98.6 °F)   Resp 17        Lab Results   Component Value Date    CREATININE 0.77 10/08/2023    CREATININE 1.17 (H) 10/07/2023    CREATININE 1.56 (H) 10/07/2023    CREATININE 2.04 (H) 10/07/2023        Patient weight is No results found for: \"PTWEIGHT\"    No results found for: \"CULTURE\"     I/O last 3 completed shifts:  In: 3445.2 (31.6 mL/kg) [I.V.:195.2 (1.8 mL/kg); IV Piggyback:3250]  Out: 3605 (33.1 mL/kg) [Urine:1355 (0.3 mL/kg/hr); Stool:2250]  Weight: 109 kg   [unfilled]    Lab Results   Component Value Date    PATIENTTEMP 37.0 10/07/2023    PATIENTTEMP 37.0 10/06/2023        Assessment/Plan    Below goal AUC. Orders placed for new vancomcyin regimen of 1g every 12 hours to begin at 10/8 AM.     This dosing regimen is predicted by [employer] to result in the following pharmacokinetic parameters:    Regimen: 1000 mg IV every 12 hours.  Start time: 06:26 on 10/08/2023  Exposure target: AUC24 (range)400-600 mg/L.hr   AUC24,ss: 503 mg/L.hr  Probability of AUC24 > 400: 82 %  Ctrough,ss: 18.2 mg/L  Probability of Ctrough,ss > 20: 38 %  Probability of nephrotoxicity (Lodise VICTORIA 2009): 14 %    The next level will be obtained on 10/9 at 5AM. May be obtained sooner if clinically indicated.   Will continue to monitor renal function daily while on vancomycin and order serum creatinine at least every 48 hours if not already ordered.  Follow for continued vancomycin needs, clinical response, and signs/symptoms of toxicity.       Charli Woodruff, " PharmD

## 2023-10-08 NOTE — PROGRESS NOTES
Keisha Santa is a 62 y.o. female on day 1 of admission presenting with Septic shock (CMS/HCC).      Subjective   Patient was seen and examined, no acute event overnight. Patient reports abdominal pain on examination. Mostly located at periumblical region. Patient denies fever, chest pain, nausea, vomiting and leg swelling.         Objective     Last Recorded Vitals  /53   Pulse 110   Temp 37 °C (98.6 °F)   Resp 15   Wt 112 kg (247 lb 2.2 oz)   SpO2 (!) 89%   Intake/Output last 3 Shifts:    Intake/Output Summary (Last 24 hours) at 10/8/2023 0828  Last data filed at 10/8/2023 0554  Gross per 24 hour   Intake 7146.01 ml   Output 5805 ml   Net 1341.01 ml       Admission Weight  Weight: 109 kg (241 lb 2.9 oz) (Simultaneous filing. User may not have seen previous data.) (10/07/23 0430)    Daily Weight  10/08/23 : 112 kg (247 lb 2.2 oz)    Image Results  XR chest 1 view  Narrative: Interpreted By:  Antonia Marsh,   STUDY:  Chest, single AP view.      INDICATION:  Signs/Symptoms:Central Line re-positioning (repeat chest X-ray).      COMPARISON:  Same day radiograph from 08/20 5 p.m.      ACCESSION NUMBER(S):  [id number]      ORDERING CLINICIAN:  KATHERINE BARRERA      FINDINGS:  Right IJ central venous catheter with tip in the cavoatrial junction.  Elevated right hemidiaphragm which is likely chronic.  Atherosclerosis of the thoracic aorta noted.  The cardiac silhouette size is within normal limits.  There is no focal consolidation, edema or pneumothorax.  No sizeable pleural effusion.  No acute osseous abnormality.      Impression: 1. No acute cardiopulmonary process.  2. Right IJ central venous catheter with tip in the cavoatrial  junction.  3. Elevated right hemidiaphragm which is likely chronic.      MACRO:  None.      Signed by: Antonia Marsh 10/7/2023 9:38 PM  Dictation workstation:   [id number]  XR chest 1 view  Narrative: Interpreted By:  Antonia Marsh,   STUDY:  Chest, single AP view.       INDICATION:  Signs/Symptoms:confirm cvc placement.      COMPARISON:  CT chest and chest radiograph 10/06/2023      ACCESSION NUMBER(S):  [id number]      ORDERING CLINICIAN:  USRULA SHAFFER      FINDINGS:  Right IJ central venous catheter with tip in the proximal right  atrium.      Elevated right hemidiaphragm which is likely chronic. Atherosclerosis  of the thoracic aorta.      The cardiac silhouette size is within normal limits.  There is mild diffuse prominence of the interstitial lung markings  likely representing mild interstitial pulmonary edema. No sizeable  pleural effusion. No acute osseous abnormality.      Impression: 1. Mild diffuse prominence of the interstitial lung markings likely  representing mild interstitial pulmonary edema.  2. Right IJ central venous catheter with tip in the proximal right  atrium      MACRO:  None.      Signed by: Antonia Marsh 10/7/2023 9:37 PM  Dictation workstation:   [id number]      Physical Exam  Constitutional:       Appearance: She is obese.   HENT:      Head: Normocephalic.   Eyes:      Pupils: Pupils are equal, round, and reactive to light.   Cardiovascular:      Rate and Rhythm: Tachycardia present.      Heart sounds: Normal heart sounds.   Pulmonary:      Breath sounds: Normal breath sounds.   Abdominal:      Tenderness: There is abdominal tenderness.   Musculoskeletal:         General: Tenderness present.      Comments: Tenderness at left lower leg, present of open wound   Neurological:      Mental Status: She is oriented to person, place, and time.         Relevant Results               Assessment/Plan   This patient currently has cardiac telemetry ordered; if you would like to modify or discontinue the telemetry order, click here to go to the orders activity to modify/discontinue the order.    This patient has a central line   Reason for the central line remaining today? Parenteral medication    Keisha Santa is a 62 y.o. female on day 0 of admission with  PMH of HTN, HLD, T2DM, MUKESH non-compliant with CPAP, COPD, Parkinsonism, cervical cancer, 2 CVAs, HFpEF w/ EF 65%, chronic diarrhea, PVD, and chronic alcoholism who is admitted to the ICU for septic shock 2/2 colitis, cellulitis, and possible UTI.      Neurology:  #Acute metabolic encephalopathy likely 2/2 sepsis (improving)  - Upon reassessment in the morning, patient remains A and O x 2-3, improving from admission  - Anticipate improvement in mental status with current medical management     #Chronic alcoholism   - CIWA protocol in place  - Continuing folic acid and multivitamin     #Parkinsonism:  - Patient continues to show tremors, however the patient is not on any home regimen for Parkinson's disease. Will continue to monitor.     Cardiovascular:  # Shock  #Septic vs Hypovolemia2/2 Colitis, possible cellulitis and UTI  - Lactate on most recent VBG 1.4 and stable  - Patient continues to require Levophed. Goal MAP>65  - On Maintenance fluid 0.45 nacl rate 100ml/hr  - Will check morning cortisol level to rule out adrenal insufficiency   - Fluid boluses PRN     #HTN/CHF   - Holding home HTN and CHF meds: Per chart review, patient recently filled a prescription for carvedilol 25 mg, amlodipine 10 mg, and spironolactone 50 mg. She used to take diltiazem, bumetanide, and lisinopril in March 2022  - Repeat echo ordered and pending. Patient most recently had an echo in May 2023, however no EF is reported. Most recent EF on file is 65% in May 2018.      #HLD/ CVA  - Continue ASA 81 mg and atorvastatin 40 mg daily.        Pulmonary:  #Acute hypoxic respiratory failure 2/2 sepsis and possible COPD exacerbation (Resolved)  - Patient currently on room air, satting 95%, titrate O2 as needed.  - Continue home albuterol inhalers PRN, Breo Ellipta scheduled, montelukast 10 mg.      Gastrointestinal:  #Chronic diarrhea:  #Colitis, rule out C. diff:  - C. Diff negative.   - Stool PCR, calprotectin, cyclospora pending     #Elevated  lipase, concern for acute pancreatitis  - Lipase level 155 on admission  - CT abdomen/pelvis did not show concerning signs of pancreatitis  - Fecal elastase pending     Renal/Urology:  #ITALO 2/2 sepsis and hypovolemia in the setting of chronic diarrhea and decreased PO intake:   - Currently resolving, most recent BUN/Cr 30/1.56 w/ GFR 28  - Continue current management.  -Monitor with a.m. lab, avoid nephrotoxic medications.     #Hyponatremia:  - Avoid rapid correction no more than 8 points per day     #Metabolic acidosis (resolving)   - On admission VBG 7.28/39/29/18  - Repeat VBG showed pH 7.31/pCO2 35/pO2 41/Bicarb 17.6/Lactate 1.4  - Continuing management     #Hyperkalemia (Resolved)     Hematology/Oncology:  #Leukocytosis 2/2 sepsis  - Continue current management, monitor WBC count     Infectious Disease:  #Septic shock 2/2 colitis, cellulitis, possible UTI  - Discontinued ceftriaxone and metronidazole (10/7 - 10/7)  - Due to concern for C. Diff, patient was started on PO vancomycin  - PO vanc discontinued due to C. Diff being negative  - Starting IV vancomycin and Zosyn (10/7 - )  - ID consulted, appreciate recs  - Wound care consulted, appreciate recs  - Blood and urine cultures pending     Endocrine:  #T2DM  - Mild SSI      Musculoskeletal/Skin:  - See ID section     Psychiatric:  - No active issues.        Fluids: PO PRN IV  Electrolytes: Replete as needed.  Nutrition: Diabetic and 2 gram Na  GI prophylaxis: None  VTE prophylaxis: Heparin subcutaneous   Abx: Ceftriaxone and metronidazole (10/7 - 10/7). PO vancomycin (10/7 - 10/7). IV vancomycin and Zosyn (10/7 - )  Code Status: DNR, needs to be revised with the patient and family when she improves.  Lines/Tubes: PIV      Disposition: Patient admitted with septic shock likely secondary to colitis. On Vanc/Zosyn. Continues to require low dose Levophed. Patient will remain in ICU.                  Phoenix Gresham MD

## 2023-10-08 NOTE — PROGRESS NOTES
Physical Therapy    Physical Therapy Evaluation & Treatment    Patient Name: Keisha Santa  MRN: [id number]  Today's Date: 10/8/2023   Time Calculation  Start Time: 0901  Stop Time: 0924  Time Calculation (min): 23 min    Assessment/Plan   PT Assessment  PT Assessment Results: Decreased endurance, Decreased strength, Decreased mobility, Decreased cognition, Obesity, Pain  Rehab Prognosis: Good  Barriers to Discharge: Pt lives in [place]  Evaluation/Treatment Tolerance: Patient limited by fatigue, Patient limited by pain  Medical Staff Made Aware: Yes  End of Session Communication: Bedside nurse  Assessment Comment: Pt is a 80 yo female admitted with weakness and diarrhea. She presents with weakness and decreased endurance limiting her mobility which is alos limited by her abdominal pain. Pt would benefit from PT services at this time for strengthing and mobility trainiing to improve endurance and return towards prior level of function.  End of Session Patient Position: Bed, 3 rail up, Alarm off, not on at start of session  IP OR SWING BED PT PLAN  Inpatient or Swing Bed: Inpatient  PT Plan  Treatment/Interventions: Bed mobility, Transfer training, Gait training, Balance training, Strengthening, Endurance training, Therapeutic activity, Therapeutic exercise, Home exercise program  PT Plan: Skilled PT  PT Frequency: 3 times per week  PT Discharge Recommendations: Moderate intensity level of continued care      Subjective     General Visit Information:  General  Reason for Referral: Pt admitted with weakness and diarrhea  Referred By: Mine Jones DO  Past Medical History Relevant to Rehab: Parkinsonism, 2 previous CVA's  Co-Treatment: OT  Co-Treatment Reason: To maximize mobility and activity tolerance safely  Prior to Session Communication: Bedside nurse  Patient Position Received: Bed, 4 rail up, Alarm off, not on at start of session  General Comment: Pt states she was in 10/10 pain in her stomach, agreeable  to therapy (FMS, Central line, telemetry, continuous puls ox, albarado catheter)  Home Living:  Home Living  Type of Home: House (In [place], currently visiting children in [place])  Lives With: Significant other  Home Adaptive Equipment: Walker rolling or standard  Home Layout: One level  Home Access: Ramped entrance  Bathroom Shower/Tub: Walk-in shower  Bathroom Equipment: Shower chair without back  Prior Level of Function:  Prior Function Per Pt/Caregiver Report  Level of [place]: Independent with ADLs and functional transfers, Independent with homemaking with ambulation  Precautions:     Vital Signs:  Vital Signs  BP: 107/68    Objective   Pain:  Pain Assessment  Pain Assessment: 0-10  Pain Score: 10 - Worst possible pain  Pain Type: Acute pain  Pain Location: Abdomen  Pain Interventions: Ambulation/increased activity  Response to Interventions: No change in pain  Cognition:  Cognition  Orientation Level: Oriented X4    General Assessments:      BLE weakness 3-/5 to 3/5               Static Sitting Balance  Static Sitting-Balance Support: Feet unsupported, Bilateral upper extremity supported  Static Sitting-Level of Assistance: Close supervision  Static Sitting-Comment/Number of Minutes: 10       Functional Assessments:  Bed Mobility  Bed Mobility: Yes  Bed Mobility 1  Bed Mobility 1: Supine to sitting  Level of Assistance 1: Moderate assistance (x2)  Bed Mobility Comments 1: Increased time required  Bed Mobility 2  Bed Mobility  2: Sitting to supine  Level of Assistance 2: Maximum assistance (x2)  Bed Mobility 3  Bed Mobility 3: Rolling right  Level of Assistance 3: Maximum assistance (x1)  Extremity/Trunk Assessments:              Treatments:  Therapeutic Activity  Therapeutic Activity Performed: Yes  Therapeutic Activity 1: Pt sat EOB ~10 mins, close supervision  Outcome Measures:  [facility] Basic Mobility  Turning from your back to your side while in a flat bed without using bedrails: A lot  Moving from lying on  your back to sitting on the side of a flat bed without using bedrails: A lot  Moving to and from bed to chair (including a wheelchair): Total  Standing up from a chair using your arms (e.g. wheelchair or bedside chair): Total  To walk in hospital room: Total  Climbing 3-5 steps with railing: Total  Basic Mobility - Total Score: 8    Encounter Problems       Encounter Problems (Active)       Mobility       STG - Patient will ambulate at least 25' with FWW CGA (Progressing)       Start:  10/08/23    Expected End:  10/22/23               Transfers       STG - Patient to transfer to and from sit to supine with CGAx1 (Progressing)       Start:  10/08/23    Expected End:  10/22/23            STG - Patient will perform bed mobility with SBA (Progressing)       Start:  10/08/23    Expected End:  10/22/23            STG - Patient will roll SBA (Progressing)       Start:  10/08/23    Expected End:  10/22/23            STG - Patient will transfer sit to and from stand CGAx1 with FWW (Progressing)       Start:  10/08/23    Expected End:  10/22/23                   Education Documentation  Home Exercise Program, taught by Giuliana Ochoa PT at 10/8/2023 10:01 AM.  Learner: Patient  Readiness: Acceptance  Method: Explanation  Response: Verbalizes Understanding    Mobility Training, taught by Giuliana Ochoa PT at 10/8/2023 10:01 AM.  Learner: Patient  Readiness: Acceptance  Method: Explanation  Response: Verbalizes Understanding    Education Comments  No comments found.

## 2023-10-08 NOTE — CARE PLAN
Problem: COGNITION/SAFETY  Goal: Patient will follow 75% Two commands to allow improved ADL performance.  Outcome: Progressing     Problem: ADLs  Goal: Patient with complete lower body dressing with minimal assist  level of assistance donning and doffing all LE clothes  with reacher, shoe horn, and sock-aid while supported sitting  Outcome: Progressing  Goal: Patient will complete daily grooming tasks brushing teeth and washing face/hair with minimal assist  level of assistance and PRN adaptive equipment while supported sitting.  Outcome: Progressing  Goal: Patient will complete toileting including hygiene clothing management/hygiene with minimal assist  level of assistance and raised toilet seat, grab bars, and shower chair.  Outcome: Progressing     Problem: TRANSFERS  Goal: Patient will perform bed mobility minimal assist  level of assistance and bed rails in order to improve safety and independence with mobility  Outcome: Progressing  Goal: Patient will complete functional transfers with front wheeled walker with minimal assist  level of assistance.  Outcome: Progressing

## 2023-10-08 NOTE — PROGRESS NOTES
10/08/23 1200   Discharge Planning   Living Arrangements Spouse/significant other   Support Systems Spouse/significant other   Assistance Needed Patient lives in [place] with boyfriend, x3, ambulates with a walker, drives, 3liters here ( none baseline), drinks 8 beers daily   Type of Residence Private residence   Home or Post Acute Services   (TBD pending PT/ OT eval)   Patient expects to be discharged to: TBD pending PT/ OT eval   Does the patient need discharge transport arranged? No     SW consulted for ETOH abuse

## 2023-10-08 NOTE — PROGRESS NOTES
"Keisha Santa is a 62 y.o. female on day 1 of admission presenting with Septic shock (CMS/HCC).    Subjective   Interval History: no fever, abdominal pain, no emesis        Review of Systems    Objective   Range of Vitals (last 24 hours)  Heart Rate:  []   Temp:  [37 °C (98.6 °F)-38.3 °C (100.9 °F)]   Resp:  [12-27]   BP: ()/()   Height:  [160 cm (5' 2.99\")]   Weight:  [112 kg (247 lb 2.2 oz)]   SpO2:  [89 %-100 %]   Daily Weight  10/08/23 : 112 kg (247 lb 2.2 oz)    Body mass index is 43.79 kg/m².    Physical Exam  Constitutional:       Appearance: Normal appearance.   HENT:      Head: Normocephalic and atraumatic.      Mouth/Throat:      Mouth: Mucous membranes are moist.      Pharynx: Oropharynx is clear.   Eyes:      Pupils: Pupils are equal, round, and reactive to light.   Cardiovascular:      Rate and Rhythm: Normal rate and regular rhythm.      Heart sounds: Normal heart sounds.   Pulmonary:      Effort: Pulmonary effort is normal.      Breath sounds: Normal breath sounds.   Abdominal:      General: Abdomen is flat. Bowel sounds are normal.      Palpations: Abdomen is soft.      Comments: Tender, distended   Musculoskeletal:      Cervical back: Normal range of motion.      Comments: stasis   Neurological:      Mental Status: She is alert.         Antibiotics  heparin (porcine) injection 7,500 Units  cefTRIAXone (Rocephin) IVPB 1 g  metroNIDAZOLE in NaCl (iso-os) (Flagyl)  mg  norepinephrine (Levophed) 8 mg in dextrose 5% 250 mL (0.032 mg/mL) infusion (premix)  flu vaccine (IIV4) age 6 months and greater, preservative free  lactated Ringer's bolus 1,000 mL  dilTIAZem (Cardizem CD) 24 hr capsule        amLODIPine (Norvasc) tablet  aspirin EC tablet  atorvastatin (Lipitor) tablet  bumetanide (Bumex) tablet  carvedilol (Coreg) tablet                montelukast (Singulair) tablet    albuterol 90 mcg/actuation inhaler 2 puff  albuterol 2.5 mg /3 mL (0.083 %) nebulizer solution 2.5 " mg  aspirin EC tablet 81 mg  atorvastatin (Lipitor) tablet 40 mg  diclofenac sodium (Voltaren) 1 % gel 1 Application  EPINEPHrine (Epipen) injection syringe 0.3 mg  fluticasone (Flonase) nasal spray 2 spray  fluticasone furoate-vilanteroL (Breo Ellipta) 200-25 mcg/dose inhaler 1 puff  montelukast (Singulair) tablet 10 mg  dextrose 50 % injection 25 g  glucagon (Glucagen) injection 1 mg  dextrose 10 % in water (D10W) infusion  insulin lispro (HumaLOG) injection 0-5 Units  acetaminophen (Tylenol) tablet 650 mg  folic acid (Folvite) tablet 1 mg  multivitamin with minerals 1 tablet  LORazepam (Ativan) injection 0.5 mg  LORazepam (Ativan) injection 1 mg  LORazepam (Ativan) injection 2 mg  nystatin (Mycostatin) cream  vancomycin (Vancocin) capsule 125 mg  piperacillin-tazobactam-dextrose (Zosyn) IV 4.5 g  sodium chloride 0.9 % bolus 500 mL  vancomycin (Vancocin) 2,000 mg in dextrose 5 % in water (D5W) 500 mL IV  vancomycin in dextrose 5 % (Vancocin) IVPB 750 mg  atorvastatin (Lipitor) tablet 40 mg  diclofenac sodium (Voltaren) 1 % gel 1 Application  magnesium sulfate IV 2 g  sodium chloride 0.9 % bolus 1,000 mL  perflutren lipid microspheres (Definity) injection 0.5 mL  sulfur hexafluoride microsphr (Lumason) injection 24.28 mg  perflutren protein A microsphere (Optison) injection 0.5 mL  insulin lispro (HumaLOG) injection 0-10 Units  sodium chloride 0.9 % bolus 1,000 mL  HYDROmorphone (Dilaudid) injection 0.2 mg  gabapentin (Neurontin) capsule 300 mg  cyclobenzaprine (Flexeril) tablet 5 mg  sodium chloride 0.9 % bolus 1,000 mL  albuterol 2.5 mg /3 mL (0.083 %) nebulizer solution 2.5 mg  sodium chloride 0.9 % bolus 1,000 mL  sodium chloride 0.9 % bolus 1,000 mL  sodium chloride 0.9 % bolus 1,000 mL  dextrose 5 % in water (D5W) infusion  potassium phosphates 15 mmol in dextrose 5 % in water (D5W) 250 mL IV  vancomycin in dextrose 5 % (Vancocin) IVPB 1,000 mg  lactated Ringer's bolus 1,000 mL      Relevant  "Results  Labs  Results from last 72 hours   Lab Units 10/08/23  0508 10/07/23  1144 10/06/23  2051   WBC AUTO x10*3/uL 9.3 11.9* 27.2*   HEMOGLOBIN g/dL 8.3* 10.5* 12.4   HEMATOCRIT % 25.7* 31.1* 35.8*   PLATELETS AUTO x10*3/uL 284 400 455*   LYMPHO PCT MAN %  --   --  6.0   MONO PCT MAN %  --   --  5.0   EOSINO PCT MAN %  --   --  4.0     Results from last 72 hours   Lab Units 10/08/23  0508 10/07/23  1834 10/07/23  1144   SODIUM mmol/L 132* 124* 123*   POTASSIUM mmol/L 4.0 4.7 5.2   CHLORIDE mmol/L 108* 96* 93*   CO2 mmol/L 20* 20* 22   BUN mg/dL 12 22 30*   CREATININE mg/dL 0.77 1.17* 1.56*   GLUCOSE mg/dL 123* 175* 129*   CALCIUM mg/dL 7.3* 6.8* 8.6   ANION GAP mmol/L 8* 13 13   EGFR mL/min/1.73m*2 88 53* 38*   PHOSPHORUS mg/dL 2.3* 3.4 4.3     Results from last 72 hours   Lab Units 10/08/23  0508 10/07/23  1834 10/07/23  1144 10/06/23  2051   ALK PHOS U/L 40  --   --  72   BILIRUBIN TOTAL mg/dL 0.3  --   --  0.5   BILIRUBIN DIRECT mg/dL  --   --   --  0.1   PROTEIN TOTAL g/dL 4.5*  --   --  7.1   ALT U/L 15  --   --  31   AST U/L 21  --   --  49*   ALBUMIN g/dL 2.6* 2.9* 3.4 4.4     Estimated Creatinine Clearance: 92.3 mL/min (by C-G formula based on SCr of 0.77 mg/dL).  No results found for: \"CRP\"  Microbiology  Reviewed  Imaging  Reviewed    Assessment/Plan   Sepsis, the BC is pending  Abdominal pain / diarrhea, C. Diff is negative  Respiratory failure / COPD / atelectasis  Legs stasis  Encephalopathy     Recommendations :  Continue Zosyn and Vancomycin     I spent  minutes in the professional and overall care of this patient.      Lizbeth Garcia MD  "

## 2023-10-08 NOTE — PROGRESS NOTES
Occupational Therapy    Evaluation/Treatment    Patient Name: Keisha Santa  MRN: [id number]  : 1960  Today's Date: 10/08/23  Time Calculation  Start Time: 900  Stop Time: 923  Time Calculation (min): 23 min       Assessment:  OT Assessment: Pt. would continue to benefit from Mod intensity OT upon d/c d/t decreased endurance and increased endurance  OT Assessment Results: Decreased ADL status, Decreased safe judgment during ADL, Decreased endurance  Barriers to Participation: Capable of completing ADLs semi/independent, Coping skills  Plan:  OT Frequency: 3 times per week  OT Discharge Recommendations: Moderate intensity level of continued care       Subjective   Current Problem:  1. Septic shock (CMS/HCC)        2. Chronic congestive heart failure, unspecified heart failure type (CMS/HCC)  Transthoracic Echo (TTE) Complete    Transthoracic Echo (TTE) Complete        General:   OT Received On: 10/08/23  General  Reason for Referral: 62 y.o. F admitted for septic shock  Referred By: Mine Jones  Past Medical History Relevant to Rehab: Parkinsonism, obesity, MUKESH, COPD, HTN, DM, CVA, alcoholism, leg stenosis  Family/Caregiver Present: No  Co-Treatment: PT  Co-Treatment Reason: To maximize partivcipation and activity tolerance  Prior to Session Communication: Bedside nurse  Patient Position Received: Bed, 4 rail up, Alarm off, not on at start of session  General Comment: Pt. cleared for OT session, pt. agreeable to OT session  Precautions:  Medical Precautions: Fall precautions  Vital Signs:  BP: 107/68  Pain:  Pain Assessment  Pain Assessment: 0-10  Pain Type: Acute pain  Pain Location: Abdomen  Pain Descriptors: Sharp    Objective   Cognition:  Orientation Level: Oriented X4  Memory: Exceptions to WFL  Short-Term Memory: Impaired (unsure of how she got to hospital)           Home Living:  Type of Home: House  Lives With: Significant other  Home Adaptive Equipment: Walker rolling or standard  Home  Layout: One level, Laundry main level, Full bath main level  Home Access: Ramped entrance  Bathroom Shower/Tub: Walk-in shower  Bathroom Toilet: Standard  Bathroom Equipment: Grab bars in shower, Tub transfer bench  Prior Function:  Level of [place]: Independent with ADLs and functional transfers, Independent with homemaking with ambulation  Hand Dominance: Right  IADL History:     ADL:  Grooming Assistance: Maximal  Grooming Deficit: Steadying, Increased time to complete  LE Dressing Assistance: Maximal  LE Dressing Deficit: Steadying, Increased time to complete, Supervision/safety, Don/doff R sock, Don/doff L sock  Activities of Daily Living: Grooming  Grooming Level of Assistance: Maximum assistance  Grooming Where Assessed: Edge of bed  Grooming Comments: This OT cobed pt. hair with Max A while pt. sitting EOB with CGA support    LE Dressing  LE Dressing: Yes  Activity Tolerance:  Endurance: Tolerates less than 10 min exercise with changes in vital signs, Decreased tolerance for upright activites  Functional Standing Tolerance:  Time: 10  Activity: sitting EOB  Bed Mobility/Transfers: Bed Mobility  Bed Mobility: Yes  Bed Mobility 1  Bed Mobility 1: Supine to sitting  Level of Assistance 1: Moderate assistance (x2)  Bed Mobility 2  Bed Mobility  2: Sitting to supine  Level of Assistance 2: Maximum assistance (x2)                Extremities: RUE   RUE : Within Functional Limits and LUE   LUE: Within Functional Limits      Outcome Measures: [facility] Daily Activity  Putting on and taking off regular lower body clothing: A little  Bathing (including washing, rinsing, drying): Total  Putting on and taking off regular upper body clothing: A lot  Toileting, which includes using toilet, bedpan or urinal: A lot  Taking care of personal grooming such as brushing teeth: A little  Eating Meals: None  Daily Activity - Total Score: 15        Education Documentation  Precautions, taught by Taylor Oneill OT at 10/8/2023 10:20  AM.  Learner: Patient  Readiness: Acceptance  Method: Explanation  Response: Verbalizes Understanding    ADL Training, taught by Taylor Oneill OT at 10/8/2023 10:20 AM.  Learner: Patient  Readiness: Acceptance  Method: Explanation  Response: Verbalizes Understanding    Education Comments  No comments found.        OP EDUCATION:  Education  Individual(s) Educated: Patient  Education Provided: Joint protection and energy conservation, Ergonomics and postural realignment, Fall precautons, Risk and benefits of OT discussed with patient or other, POC discussed and agreed upon    Goals:  Encounter Problems       Encounter Problems (Active)       ADLs       Patient with complete lower body dressing with minimal assist  level of assistance donning and doffing all LE clothes  with reacher, shoe horn, and sock-aid while supported sitting (Progressing)       Start:  10/08/23    Expected End:  10/22/23            Patient will complete daily grooming tasks brushing teeth and washing face/hair with minimal assist  level of assistance and PRN adaptive equipment while supported sitting. (Progressing)       Start:  10/08/23    Expected End:  10/22/23            Patient will complete toileting including hygiene clothing management/hygiene with minimal assist  level of assistance and raised toilet seat, grab bars, and shower chair. (Progressing)       Start:  10/08/23    Expected End:  10/22/23               COGNITION/SAFETY       Patient will follow 75% Two commands to allow improved ADL performance. (Progressing)       Start:  10/08/23    Expected End:  10/22/23                          TRANSFERS       Patient will perform bed mobility minimal assist  level of assistance and bed rails in order to improve safety and independence with mobility (Progressing)       Start:  10/08/23    Expected End:  10/22/23            Patient will complete functional transfers with front wheeled walker with minimal assist  level of assistance.  (Progressing)       Start:  10/08/23    Expected End:  10/22/23

## 2023-10-09 ENCOUNTER — APPOINTMENT (OUTPATIENT)
Dept: CARDIOLOGY | Facility: HOSPITAL | Age: 63
End: 2023-10-09
Payer: MEDICAID

## 2023-10-09 LAB
ALBUMIN SERPL BCP-MCNC: 2.7 G/DL (ref 3.4–5)
ANION GAP SERPL CALC-SCNC: 12 MMOL/L (ref 10–20)
BUN SERPL-MCNC: 3 MG/DL (ref 6–23)
CALCIUM SERPL-MCNC: 7.9 MG/DL (ref 8.6–10.3)
CHLORIDE SERPL-SCNC: 104 MMOL/L (ref 98–107)
CHLORIDE UR-SCNC: 97 MMOL/L
CHLORIDE/CREATININE (MMOL/G) IN URINE: 284 MMOL/G CREAT (ref 38–318)
CO2 SERPL-SCNC: 22 MMOL/L (ref 21–32)
CREAT SERPL-MCNC: 0.55 MG/DL (ref 0.5–1.05)
CREAT UR-MCNC: 34.2 MG/DL (ref 20–320)
EJECTION FRACTION APICAL 4 CHAMBER: 65.9
ERYTHROCYTE [DISTWIDTH] IN BLOOD BY AUTOMATED COUNT: 14.7 % (ref 11.5–14.5)
GFR SERPL CREATININE-BSD FRML MDRD: >90 ML/MIN/1.73M*2
GLUCOSE BLD MANUAL STRIP-MCNC: 103 MG/DL (ref 74–99)
GLUCOSE BLD MANUAL STRIP-MCNC: 98 MG/DL (ref 74–99)
GLUCOSE SERPL-MCNC: 89 MG/DL (ref 74–99)
HCT VFR BLD AUTO: 25 % (ref 36–46)
HGB BLD-MCNC: 8 G/DL (ref 12–16)
MAGNESIUM SERPL-MCNC: 1.5 MG/DL (ref 1.6–2.4)
MCH RBC QN AUTO: 31.1 PG (ref 26–34)
MCHC RBC AUTO-ENTMCNC: 32 G/DL (ref 32–36)
MCV RBC AUTO: 97 FL (ref 80–100)
NRBC BLD-RTO: 0 /100 WBCS (ref 0–0)
PHOSPHATE SERPL-MCNC: 1.9 MG/DL (ref 2.5–4.9)
PLATELET # BLD AUTO: 293 X10*3/UL (ref 150–450)
PMV BLD AUTO: 9.1 FL (ref 7.5–11.5)
POTASSIUM SERPL-SCNC: 4.1 MMOL/L (ref 3.5–5.3)
POTASSIUM UR-SCNC: 13 MMOL/L
POTASSIUM/CREAT UR-RTO: 38 MMOL/G CREAT
RBC # BLD AUTO: 2.57 X10*6/UL (ref 4–5.2)
SODIUM SERPL-SCNC: 134 MMOL/L (ref 136–145)
SODIUM UR-SCNC: 77 MMOL/L
SODIUM/CREAT UR-RTO: 225 MMOL/G CREAT
VANCOMYCIN SERPL-MCNC: 12.7 UG/ML (ref 5–20)
WBC # BLD AUTO: 10.6 X10*3/UL (ref 4.4–11.3)

## 2023-10-09 PROCEDURE — 96372 THER/PROPH/DIAG INJ SC/IM: CPT | Performed by: INTERNAL MEDICINE

## 2023-10-09 PROCEDURE — 2500000005 HC RX 250 GENERAL PHARMACY W/O HCPCS

## 2023-10-09 PROCEDURE — 2500000001 HC RX 250 WO HCPCS SELF ADMINISTERED DRUGS (ALT 637 FOR MEDICARE OP)

## 2023-10-09 PROCEDURE — 2500000004 HC RX 250 GENERAL PHARMACY W/ HCPCS (ALT 636 FOR OP/ED): Performed by: INTERNAL MEDICINE

## 2023-10-09 PROCEDURE — 2500000001 HC RX 250 WO HCPCS SELF ADMINISTERED DRUGS (ALT 637 FOR MEDICARE OP): Performed by: INTERNAL MEDICINE

## 2023-10-09 PROCEDURE — 2500000004 HC RX 250 GENERAL PHARMACY W/ HCPCS (ALT 636 FOR OP/ED)

## 2023-10-09 PROCEDURE — 80202 ASSAY OF VANCOMYCIN: CPT | Performed by: INTERNAL MEDICINE

## 2023-10-09 PROCEDURE — 82947 ASSAY GLUCOSE BLOOD QUANT: CPT

## 2023-10-09 PROCEDURE — 85027 COMPLETE CBC AUTOMATED: CPT

## 2023-10-09 PROCEDURE — 99291 CRITICAL CARE FIRST HOUR: CPT

## 2023-10-09 PROCEDURE — 82436 ASSAY OF URINE CHLORIDE: CPT | Mod: CMCLAB,GEALAB

## 2023-10-09 PROCEDURE — 83735 ASSAY OF MAGNESIUM: CPT

## 2023-10-09 PROCEDURE — A4217 STERILE WATER/SALINE, 500 ML: HCPCS | Performed by: INTERNAL MEDICINE

## 2023-10-09 PROCEDURE — 84300 ASSAY OF URINE SODIUM: CPT | Mod: CMCLAB,GEALAB

## 2023-10-09 PROCEDURE — 96372 THER/PROPH/DIAG INJ SC/IM: CPT

## 2023-10-09 PROCEDURE — 80069 RENAL FUNCTION PANEL: CPT

## 2023-10-09 PROCEDURE — 94667 MNPJ CHEST WALL 1ST: CPT

## 2023-10-09 PROCEDURE — 84133 ASSAY OF URINE POTASSIUM: CPT | Mod: CMCLAB,GEALAB

## 2023-10-09 PROCEDURE — 2020000001 HC ICU ROOM DAILY

## 2023-10-09 PROCEDURE — 37799 UNLISTED PX VASCULAR SURGERY: CPT

## 2023-10-09 PROCEDURE — 97110 THERAPEUTIC EXERCISES: CPT | Mod: GP

## 2023-10-09 PROCEDURE — 93306 TTE W/DOPPLER COMPLETE: CPT

## 2023-10-09 PROCEDURE — 93306 TTE W/DOPPLER COMPLETE: CPT | Performed by: STUDENT IN AN ORGANIZED HEALTH CARE EDUCATION/TRAINING PROGRAM

## 2023-10-09 RX ORDER — ONDANSETRON HYDROCHLORIDE 2 MG/ML
4 INJECTION, SOLUTION INTRAVENOUS EVERY 8 HOURS PRN
Status: DISCONTINUED | OUTPATIENT
Start: 2023-10-09 | End: 2023-10-16 | Stop reason: HOSPADM

## 2023-10-09 RX ORDER — ENOXAPARIN SODIUM 100 MG/ML
40 INJECTION SUBCUTANEOUS EVERY 12 HOURS SCHEDULED
Status: DISCONTINUED | OUTPATIENT
Start: 2023-10-09 | End: 2023-10-16 | Stop reason: HOSPADM

## 2023-10-09 RX ORDER — GABAPENTIN 400 MG/1
400 CAPSULE ORAL 2 TIMES DAILY
Status: DISCONTINUED | OUTPATIENT
Start: 2023-10-09 | End: 2023-10-16 | Stop reason: HOSPADM

## 2023-10-09 RX ORDER — MAGNESIUM SULFATE HEPTAHYDRATE 40 MG/ML
2 INJECTION, SOLUTION INTRAVENOUS ONCE
Status: COMPLETED | OUTPATIENT
Start: 2023-10-09 | End: 2023-10-09

## 2023-10-09 RX ORDER — ATORVASTATIN CALCIUM 40 MG/1
40 TABLET, FILM COATED ORAL DAILY
Status: DISCONTINUED | OUTPATIENT
Start: 2023-10-09 | End: 2023-10-16 | Stop reason: HOSPADM

## 2023-10-09 RX ORDER — ONDANSETRON 4 MG/1
4 TABLET, ORALLY DISINTEGRATING ORAL EVERY 8 HOURS PRN
Status: DISCONTINUED | OUTPATIENT
Start: 2023-10-09 | End: 2023-10-16 | Stop reason: HOSPADM

## 2023-10-09 RX ADMIN — NYSTATIN: 100000 CREAM TOPICAL at 21:00

## 2023-10-09 RX ADMIN — RUGBY ZINC OXIDE 20% 1 APPLICATION: 20 OINTMENT TOPICAL at 00:36

## 2023-10-09 RX ADMIN — HEPARIN SODIUM 7500 UNITS: 5000 INJECTION INTRAVENOUS; SUBCUTANEOUS at 08:23

## 2023-10-09 RX ADMIN — MAGNESIUM SULFATE HEPTAHYDRATE 2 G: 40 INJECTION, SOLUTION INTRAVENOUS at 10:46

## 2023-10-09 RX ADMIN — WATER 1250 MG: 1 INJECTION INTRAMUSCULAR; INTRAVENOUS; SUBCUTANEOUS at 08:12

## 2023-10-09 RX ADMIN — GABAPENTIN 400 MG: 400 CAPSULE ORAL at 19:27

## 2023-10-09 RX ADMIN — ATORVASTATIN CALCIUM 40 MG: 40 TABLET, FILM COATED ORAL at 21:52

## 2023-10-09 RX ADMIN — HEPARIN SODIUM 7500 UNITS: 5000 INJECTION INTRAVENOUS; SUBCUTANEOUS at 00:33

## 2023-10-09 RX ADMIN — OXYCODONE HYDROCHLORIDE AND ACETAMINOPHEN 1 TABLET: 5; 325 TABLET ORAL at 21:52

## 2023-10-09 RX ADMIN — RUGBY ZINC OXIDE 20% 1 APPLICATION: 20 OINTMENT TOPICAL at 08:26

## 2023-10-09 RX ADMIN — NYSTATIN: 100000 CREAM TOPICAL at 08:27

## 2023-10-09 RX ADMIN — FOLIC ACID 1 MG: 1 TABLET ORAL at 08:13

## 2023-10-09 RX ADMIN — PIPERACILLIN SODIUM AND TAZOBACTAM SODIUM 4.5 G: 4; .5 INJECTION, SOLUTION INTRAVENOUS at 17:04

## 2023-10-09 RX ADMIN — ENOXAPARIN SODIUM 40 MG: 40 INJECTION SUBCUTANEOUS at 21:52

## 2023-10-09 RX ADMIN — MULTIPLE VITAMINS W/ MINERALS TAB 1 TABLET: TAB at 08:24

## 2023-10-09 RX ADMIN — MONTELUKAST 10 MG: 10 TABLET, FILM COATED ORAL at 08:23

## 2023-10-09 RX ADMIN — ONDANSETRON 4 MG: 2 INJECTION INTRAMUSCULAR; INTRAVENOUS at 19:46

## 2023-10-09 RX ADMIN — PIPERACILLIN SODIUM AND TAZOBACTAM SODIUM 4.5 G: 4; .5 INJECTION, SOLUTION INTRAVENOUS at 11:06

## 2023-10-09 RX ADMIN — MONOBASIC POTASSIUM PHOSPHATE AND DIBASIC POTASSIUM PHOSPHATE 21 MMOL: 175; 300 INJECTION INTRAVENOUS at 10:47

## 2023-10-09 RX ADMIN — FLUTICASONE FUROATE AND VILANTEROL TRIFENATATE 1 PUFF: 200; 25 POWDER RESPIRATORY (INHALATION) at 08:27

## 2023-10-09 RX ADMIN — OXYCODONE HYDROCHLORIDE 10 MG: 5 TABLET ORAL at 19:20

## 2023-10-09 RX ADMIN — ASPIRIN 81 MG: 81 TABLET, COATED ORAL at 08:13

## 2023-10-09 RX ADMIN — PIPERACILLIN SODIUM AND TAZOBACTAM SODIUM 4.5 G: 4; .5 INJECTION, SOLUTION INTRAVENOUS at 04:01

## 2023-10-09 RX ADMIN — PIPERACILLIN SODIUM AND TAZOBACTAM SODIUM 4.5 G: 4; .5 INJECTION, SOLUTION INTRAVENOUS at 21:53

## 2023-10-09 RX ADMIN — GABAPENTIN 400 MG: 400 CAPSULE ORAL at 08:23

## 2023-10-09 RX ADMIN — OXYCODONE HYDROCHLORIDE AND ACETAMINOPHEN 1 TABLET: 5; 325 TABLET ORAL at 06:32

## 2023-10-09 ASSESSMENT — LIFESTYLE VARIABLES
ORIENTATION AND CLOUDING OF SENSORIUM: ORIENTED AND CAN DO SERIAL ADDITIONS
AUDITORY DISTURBANCES: NOT PRESENT
HEADACHE, FULLNESS IN HEAD: NOT PRESENT
ANXIETY: MILDLY ANXIOUS
AGITATION: SOMEWHAT MORE THAN NORMAL ACTIVITY
NAUSEA AND VOMITING: NO NAUSEA AND NO VOMITING
PULSE: 101
VISUAL DISTURBANCES: NOT PRESENT
ORIENTATION AND CLOUDING OF SENSORIUM: ORIENTED AND CAN DO SERIAL ADDITIONS
ANXIETY: MILDLY ANXIOUS
VISUAL DISTURBANCES: NOT PRESENT
AGITATION: 2
TREMOR: NO TREMOR
NAUSEA AND VOMITING: MILD NAUSEA WITH NO VOMITING
TREMOR: NOT VISIBLE, BUT CAN BE FELT FINGERTIP TO FINGERTIP
HEADACHE, FULLNESS IN HEAD: VERY MILD
NAUSEA AND VOMITING: NO NAUSEA AND NO VOMITING
ANXIETY: 3
TOTAL SCORE: 5
ORIENTATION AND CLOUDING OF SENSORIUM: ORIENTED AND CAN DO SERIAL ADDITIONS
TOTAL SCORE: 4
AGITATION: NORMAL ACTIVITY
NAUSEA AND VOMITING: MILD NAUSEA WITH NO VOMITING
AUDITORY DISTURBANCES: NOT PRESENT
TOTAL SCORE: 3
AUDITORY DISTURBANCES: NOT PRESENT
HEADACHE, FULLNESS IN HEAD: NOT PRESENT
TREMOR: NO TREMOR
PAROXYSMAL SWEATS: NO SWEAT VISIBLE
ORIENTATION AND CLOUDING OF SENSORIUM: ORIENTED AND CAN DO SERIAL ADDITIONS
AGITATION: 2
TOTAL SCORE: 5
VISUAL DISTURBANCES: NOT PRESENT
PAROXYSMAL SWEATS: NO SWEAT VISIBLE
AGITATION: NORMAL ACTIVITY
ORIENTATION AND CLOUDING OF SENSORIUM: ORIENTED AND CAN DO SERIAL ADDITIONS
AGITATION: 2
VISUAL DISTURBANCES: NOT PRESENT
ORIENTATION AND CLOUDING OF SENSORIUM: ORIENTED AND CAN DO SERIAL ADDITIONS
HEADACHE, FULLNESS IN HEAD: NOT PRESENT
AUDITORY DISTURBANCES: NOT PRESENT
AUDITORY DISTURBANCES: NOT PRESENT
AGITATION: NORMAL ACTIVITY
TOTAL SCORE: 2
HEADACHE, FULLNESS IN HEAD: NOT PRESENT
TACTILE DISTURBANCES: VERY MILD ITCHING, PINS AND NEEDLES, BURNING OR NUMBNESS
ORIENTATION AND CLOUDING OF SENSORIUM: ORIENTED AND CAN DO SERIAL ADDITIONS
BLOOD PRESSURE: 134/73
NAUSEA AND VOMITING: MILD NAUSEA WITH NO VOMITING
PAROXYSMAL SWEATS: NO SWEAT VISIBLE
PAROXYSMAL SWEATS: NO SWEAT VISIBLE
BLOOD PRESSURE: 123/62
VISUAL DISTURBANCES: NOT PRESENT
PAROXYSMAL SWEATS: NO SWEAT VISIBLE
ANXIETY: NO ANXIETY, AT EASE
NAUSEA AND VOMITING: MILD NAUSEA WITH NO VOMITING
ANXIETY: 2
NAUSEA AND VOMITING: MILD NAUSEA WITH NO VOMITING
AUDITORY DISTURBANCES: NOT PRESENT
TREMOR: NOT VISIBLE, BUT CAN BE FELT FINGERTIP TO FINGERTIP
AUDITORY DISTURBANCES: NOT PRESENT
HEADACHE, FULLNESS IN HEAD: NOT PRESENT
VISUAL DISTURBANCES: NOT PRESENT
ANXIETY: 2
ANXIETY: MILDLY ANXIOUS
PAROXYSMAL SWEATS: NO SWEAT VISIBLE
HEADACHE, FULLNESS IN HEAD: NOT PRESENT
TREMOR: NOT VISIBLE, BUT CAN BE FELT FINGERTIP TO FINGERTIP
PAROXYSMAL SWEATS: NO SWEAT VISIBLE
PULSE: 98
TREMOR: NO TREMOR
TOTAL SCORE: 1
VISUAL DISTURBANCES: NOT PRESENT
TREMOR: NO TREMOR
TOTAL SCORE: 7

## 2023-10-09 ASSESSMENT — PAIN SCALES - GENERAL
PAINLEVEL_OUTOF10: 5 - MODERATE PAIN
PAINLEVEL_OUTOF10: 9
PAINLEVEL_OUTOF10: 9
PAINLEVEL_OUTOF10: 5 - MODERATE PAIN
PAINLEVEL_OUTOF10: 7

## 2023-10-09 ASSESSMENT — COGNITIVE AND FUNCTIONAL STATUS - GENERAL
TURNING FROM BACK TO SIDE WHILE IN FLAT BAD: A LOT
TURNING FROM BACK TO SIDE WHILE IN FLAT BAD: A LITTLE
STANDING UP FROM CHAIR USING ARMS: TOTAL
MOBILITY SCORE: 14
TOILETING: A LITTLE
TURNING FROM BACK TO SIDE WHILE IN FLAT BAD: TOTAL
MOVING TO AND FROM BED TO CHAIR: TOTAL
CLIMB 3 TO 5 STEPS WITH RAILING: TOTAL
STANDING UP FROM CHAIR USING ARMS: A LOT
MOBILITY SCORE: 7
MOVING TO AND FROM BED TO CHAIR: A LITTLE
WALKING IN HOSPITAL ROOM: TOTAL
MOBILITY SCORE: 8
WALKING IN HOSPITAL ROOM: TOTAL
MOVING FROM LYING ON BACK TO SITTING ON SIDE OF FLAT BED WITH BEDRAILS: A LOT
MOVING FROM LYING ON BACK TO SITTING ON SIDE OF FLAT BED WITH BEDRAILS: A LOT
MOVING TO AND FROM BED TO CHAIR: TOTAL
STANDING UP FROM CHAIR USING ARMS: TOTAL
HELP NEEDED FOR BATHING: A LITTLE
CLIMB 3 TO 5 STEPS WITH RAILING: TOTAL
WALKING IN HOSPITAL ROOM: TOTAL
CLIMB 3 TO 5 STEPS WITH RAILING: TOTAL
DAILY ACTIVITIY SCORE: 20
DRESSING REGULAR LOWER BODY CLOTHING: A LITTLE
DRESSING REGULAR UPPER BODY CLOTHING: A LITTLE

## 2023-10-09 ASSESSMENT — PAIN - FUNCTIONAL ASSESSMENT
PAIN_FUNCTIONAL_ASSESSMENT: 0-10
PAIN_FUNCTIONAL_ASSESSMENT: WONG-BAKER FACES
PAIN_FUNCTIONAL_ASSESSMENT: 0-10
PAIN_FUNCTIONAL_ASSESSMENT: 0-10

## 2023-10-09 ASSESSMENT — PAIN SCALES - WONG BAKER
WONGBAKER_NUMERICALRESPONSE: HURTS LITTLE MORE
WONGBAKER_NUMERICALRESPONSE: NO HURT

## 2023-10-09 NOTE — PROGRESS NOTES
10/09/23 1614   Discharge Planning   Living Arrangements Spouse/significant other   Support Systems Spouse/significant other   Assistance Needed Patient lives in [place] with boyfriend, x3, ambulates with a walker, drives   Type of Residence Private residence   Home or Post Acute Services Post acute facilities (Rehab/SNF/etc)   Patient expects to be discharged to: Home     10/09/2023 1614: Patient evaluated by PT/OT, [facility] 8/15, recommending moderate therapy. Discussed with the patient and she declined SNF and C. Patient from [place] with [place] Medicaid. IF patient continues to need SNF/agreeable to SNF will need to obtain [place] Medicaid.

## 2023-10-09 NOTE — PROGRESS NOTES
"Keisha Santa is a 62 y.o. female on day 2 of admission presenting with Septic shock (CMS/HCC).    Subjective   Pt seen at bedside this morning. Denies fever, abdominal pain. Feels that diarrhea is the same, but not worsening.       Objective     Physical Exam  Constitutional:       Appearance: Normal appearance. She is normal weight.   HENT:      Head: Normocephalic and atraumatic.      Mouth/Throat:      Mouth: Mucous membranes are moist.   Cardiovascular:      Rate and Rhythm: Normal rate and regular rhythm.      Heart sounds: Normal heart sounds. No murmur heard.     No gallop.   Pulmonary:      Breath sounds: Normal breath sounds.   Abdominal:      Palpations: Abdomen is soft.   Musculoskeletal:         General: Normal range of motion.   Skin:     General: Skin is warm.      Capillary Refill: Capillary refill takes less than 2 seconds.   Neurological:      General: No focal deficit present.      Mental Status: She is alert and oriented to person, place, and time. Mental status is at baseline.   Psychiatric:         Mood and Affect: Mood normal.         Behavior: Behavior normal.         Last Recorded Vitals  Blood pressure 101/51, pulse 91, temperature 36.9 °C (98.4 °F), temperature source Tympanic, resp. rate 18, height 1.6 m (5' 2.99\"), weight 113 kg (248 lb 0.3 oz), SpO2 99 %.  Intake/Output last 3 Shifts:  I/O last 3 completed shifts:  In: 9551.9 (85.1 mL/kg) [I.V.:2701.9 (24.1 mL/kg); IV Piggyback:6850]  Out: 9975 (88.8 mL/kg) [Urine:8275 (2 mL/kg/hr); Stool:1700]  Weight: 112.3 kg     Relevant Results  Scheduled medications  aspirin, 81 mg, oral, Daily  atorvastatin, 40 mg, oral, Daily  influenza, 0.5 mL, intramuscular, During hospitalization  fluticasone furoate-vilanteroL, 1 puff, inhalation, Daily  folic acid, 1 mg, oral, Daily  gabapentin, 400 mg, oral, BID  heparin (porcine), 7,500 Units, subcutaneous, q8h STEVE  insulin lispro, 0-10 Units, subcutaneous, TID with meals  montelukast, 10 mg, oral, " Daily  multivitamin with minerals, 1 tablet, oral, Daily  nystatin, , Topical, BID  piperacillin-tazobactam, 4.5 g, intravenous, q6h  potassium phosphate, 21 mmol, intravenous, Once  vancomycin, 1,250 mg, intravenous, q12h      Continuous medications  sodium chloride, 150 mL/hr, Last Rate: 150 mL/hr (10/09/23 1052)      PRN medications  PRN medications: acetaminophen, albuterol, albuterol, dextrose 10 % in water (D10W), dextrose, diclofenac sodium, EPINEPHrine, fluticasone, glucagon, LORazepam **OR** LORazepam **OR** LORazepam, oxyCODONE, oxyCODONE-acetaminophen, zinc oxide    Results for orders placed or performed during the hospital encounter of 10/07/23 (from the past 96 hour(s))   POCT GLUCOSE   Result Value Ref Range    POCT Glucose 145 (H) 74 - 99 mg/dL   CBC   Result Value Ref Range    WBC 11.9 (H) 4.4 - 11.3 x10*3/uL    nRBC 0.0 0.0 - 0.0 /100 WBCs    RBC 3.35 (L) 4.00 - 5.20 x10*6/uL    Hemoglobin 10.5 (L) 12.0 - 16.0 g/dL    Hematocrit 31.1 (L) 36.0 - 46.0 %    MCV 93 80 - 100 fL    MCH 31.3 26.0 - 34.0 pg    MCHC 33.8 32.0 - 36.0 g/dL    RDW 14.3 11.5 - 14.5 %    Platelets 400 150 - 450 x10*3/uL    MPV 9.3 7.5 - 11.5 fL   Renal Function Panel   Result Value Ref Range    Glucose 129 (H) 74 - 99 mg/dL    Sodium 123 (L) 136 - 145 mmol/L    Potassium 5.2 3.5 - 5.3 mmol/L    Chloride 93 (L) 98 - 107 mmol/L    Bicarbonate 22 21 - 32 mmol/L    Anion Gap 13 10 - 20 mmol/L    Urea Nitrogen 30 (H) 6 - 23 mg/dL    Creatinine 1.56 (H) 0.50 - 1.05 mg/dL    eGFR 38 (L) >60 mL/min/1.73m*2    Calcium 8.6 8.6 - 10.3 mg/dL    Phosphorus 4.3 2.5 - 4.9 mg/dL    Albumin 3.4 3.4 - 5.0 g/dL   Magnesium   Result Value Ref Range    Magnesium 1.57 (L) 1.60 - 2.40 mg/dL   Blood Gas Venous Full Panel Unsolicited   Result Value Ref Range    POCT pH, Venous 7.31 (L) 7.33 - 7.43 pH    POCT pCO2, Venous 35 (L) 41 - 51 mm Hg    POCT pO2, Venous 41 35 - 45 mm Hg    POCT SO2, Venous 67 45 - 75 %    POCT Oxy Hemoglobin, Venous 65.7 45.0 -  75.0 %    POCT Hematocrit Calculated, Venous 28.0 (L) 36.0 - 46.0 %    POCT Sodium, Venous 125 (L) 136 - 145 mmol/L    POCT Potassium, Venous 4.2 3.5 - 5.3 mmol/L    POCT Chloride, Venous 100 98 - 107 mmol/L    POCT Ionized Calicum, Venous 0.97 (L) 1.10 - 1.33 mmol/L    POCT Glucose, Venous 178 (H) 74 - 99 mg/dL    POCT Lactate, Venous 1.4 0.4 - 2.0 mmol/L    POCT Base Excess, Venous -7.9 (L) -2.0 - 3.0 mmol/L    POCT HCO3 Calculated, Venous 17.6 (L) 22.0 - 26.0 mmol/L    POCT Hemoglobin, Venous 9.3 (L) 12.0 - 16.0 g/dL    POCT Anion Gap, Venous 12.0 10.0 - 25.0 mmol/L    Patient Temperature 37.0 degrees Celsius    Apparatus CANNULA     Flow 2.0 LPM   POCT GLUCOSE   Result Value Ref Range    POCT Glucose 126 (H) 74 - 99 mg/dL   POCT GLUCOSE   Result Value Ref Range    POCT Glucose 157 (H) 74 - 99 mg/dL   Renal function panel   Result Value Ref Range    Glucose 175 (H) 74 - 99 mg/dL    Sodium 124 (L) 136 - 145 mmol/L    Potassium 4.7 3.5 - 5.3 mmol/L    Chloride 96 (L) 98 - 107 mmol/L    Bicarbonate 20 (L) 21 - 32 mmol/L    Anion Gap 13 10 - 20 mmol/L    Urea Nitrogen 22 6 - 23 mg/dL    Creatinine 1.17 (H) 0.50 - 1.05 mg/dL    eGFR 53 (L) >60 mL/min/1.73m*2    Calcium 6.8 (L) 8.6 - 10.3 mg/dL    Phosphorus 3.4 2.5 - 4.9 mg/dL    Albumin 2.9 (L) 3.4 - 5.0 g/dL   Magnesium   Result Value Ref Range    Magnesium 1.91 1.60 - 2.40 mg/dL   Vancomycin   Result Value Ref Range    Vancomycin 8.7 5.0 - 20.0 ug/mL   CBC   Result Value Ref Range    WBC 9.3 4.4 - 11.3 x10*3/uL    nRBC 0.0 0.0 - 0.0 /100 WBCs    RBC 2.60 (L) 4.00 - 5.20 x10*6/uL    Hemoglobin 8.3 (L) 12.0 - 16.0 g/dL    Hematocrit 25.7 (L) 36.0 - 46.0 %    MCV 99 80 - 100 fL    MCH 31.9 26.0 - 34.0 pg    MCHC 32.3 32.0 - 36.0 g/dL    RDW 14.6 (H) 11.5 - 14.5 %    Platelets 284 150 - 450 x10*3/uL    MPV 8.7 7.5 - 11.5 fL   Comprehensive Metabolic Panel   Result Value Ref Range    Glucose 123 (H) 74 - 99 mg/dL    Sodium 132 (L) 136 - 145 mmol/L    Potassium 4.0 3.5 -  5.3 mmol/L    Chloride 108 (H) 98 - 107 mmol/L    Bicarbonate 20 (L) 21 - 32 mmol/L    Anion Gap 8 (L) 10 - 20 mmol/L    Urea Nitrogen 12 6 - 23 mg/dL    Creatinine 0.77 0.50 - 1.05 mg/dL    eGFR 88 >60 mL/min/1.73m*2    Calcium 7.3 (L) 8.6 - 10.3 mg/dL    Albumin 2.6 (L) 3.4 - 5.0 g/dL    Alkaline Phosphatase 40 33 - 136 U/L    Total Protein 4.5 (L) 6.4 - 8.2 g/dL    AST 21 9 - 39 U/L    Bilirubin, Total 0.3 0.0 - 1.2 mg/dL    ALT 15 7 - 45 U/L   Magnesium   Result Value Ref Range    Magnesium 1.90 1.60 - 2.40 mg/dL   Phosphorus   Result Value Ref Range    Phosphorus 2.3 (L) 2.5 - 4.9 mg/dL   POCT GLUCOSE   Result Value Ref Range    POCT Glucose 149 (H) 74 - 99 mg/dL   Lactate   Result Value Ref Range    Lactate 1.4 0.4 - 2.0 mmol/L   Cortisol AM   Result Value Ref Range    Cortisol  A.M. 17.0 5.0 - 20.0 ug/dL   POCT GLUCOSE   Result Value Ref Range    POCT Glucose 113 (H) 74 - 99 mg/dL   POCT GLUCOSE   Result Value Ref Range    POCT Glucose 97 74 - 99 mg/dL   Vancomycin   Result Value Ref Range    Vancomycin 12.7 5.0 - 20.0 ug/mL   CBC   Result Value Ref Range    WBC 10.6 4.4 - 11.3 x10*3/uL    nRBC 0.0 0.0 - 0.0 /100 WBCs    RBC 2.57 (L) 4.00 - 5.20 x10*6/uL    Hemoglobin 8.0 (L) 12.0 - 16.0 g/dL    Hematocrit 25.0 (L) 36.0 - 46.0 %    MCV 97 80 - 100 fL    MCH 31.1 26.0 - 34.0 pg    MCHC 32.0 32.0 - 36.0 g/dL    RDW 14.7 (H) 11.5 - 14.5 %    Platelets 293 150 - 450 x10*3/uL    MPV 9.1 7.5 - 11.5 fL   Renal function panel   Result Value Ref Range    Glucose 89 74 - 99 mg/dL    Sodium 134 (L) 136 - 145 mmol/L    Potassium 4.1 3.5 - 5.3 mmol/L    Chloride 104 98 - 107 mmol/L    Bicarbonate 22 21 - 32 mmol/L    Anion Gap 12 10 - 20 mmol/L    Urea Nitrogen 3 (L) 6 - 23 mg/dL    Creatinine 0.55 0.50 - 1.05 mg/dL    eGFR >90 >60 mL/min/1.73m*2    Calcium 7.9 (L) 8.6 - 10.3 mg/dL    Phosphorus 1.9 (L) 2.5 - 4.9 mg/dL    Albumin 2.7 (L) 3.4 - 5.0 g/dL   Magnesium   Result Value Ref Range    Magnesium 1.50 (L) 1.60 - 2.40  mg/dL   Transthoracic Echo (TTE) Complete   Result Value Ref Range    LV A4C EF 65.9    POCT GLUCOSE   Result Value Ref Range    POCT Glucose 98 74 - 99 mg/dL      Transthoracic Echo (TTE) Complete    Result Date: 10/9/2023   [facility], [address] [place] [address]               Tel [phone] and Fax [phone] TRANSTHORACIC ECHOCARDIOGRAM REPORT  Patient Name:     FINN HOFFMAN   Reading Physician:   [id number] Mendoza MD Study Date:       10/9/2023           Ordering Provider:   [id number] ROMANA COULTER MRN/PID:          [id number]            Fellow: Accession#:       [id number]        Nurse: Date of           1960 / 62      Sonographer:         Luda Cyr RDCS Birth/Age:        years Gender:           F                   Additional Staff: Height:           157.48 cm           Admit Date:          10/7/2023 Weight:           112.04 kg           Admission Status:    Inpatient - Routine BSA:              2.09 m2             Encounter#:          [id number]                                       Department Location: [facility] ICU Blood Pressure: 122 /76 mmHg Study Type:    TRANSTHORACIC ECHO (TTE) COMPLETE Diagnosis/ICD: Heart failure, unspecified-I50.9 Indication:    Congestive Heart Failure CPT Code:      Echo Complete w Full [id number] Patient History: Diabetes:          Yes Pertinent History: COPD, HTN and CVA. abd pain, nausea. Study Detail: The following Echo studies were performed: 2D, M-Mode, Doppler and               color flow. Image quality for this study is poor. Technically               challenging study due to patient lying in supine position and               prominent lung artifact. The patient was awake.  PHYSICIAN INTERPRETATION: Left Ventricle: The left ventricular systolic function is normal, with an estimated ejection fraction of 60-65%. The left ventricular cavity size is normal. Left ventricular  diastolic filling was indeterminate. Left Atrium: The left atrium is normal in size. Right Ventricle: The right ventricle is normal in size. There is normal right ventricular global systolic function. Right Atrium: The right atrium is normal in size. Aortic Valve: The aortic valve was not well visualized. There is evidence of mildly elevated transaortic gradients consistent with sclerosis of the aortic valve. There is no evidence of aortic valve regurgitation. The peak instantaneous gradient of the aortic valve is 17.5 mmHg. The mean gradient of the aortic valve is 10.0 mmHg. Mitral Valve: The mitral valve is normal in structure. There is trace mitral valve regurgitation. Tricuspid Valve: The tricuspid valve is structurally normal. There is trace tricuspid regurgitation. The right ventricular systolic pressure is unable to be estimated. Pulmonic Valve: The pulmonic valve is not well visualized. There is trace pulmonic valve regurgitation. Pericardium: There is no pericardial effusion noted. Aorta: The aortic root is normal. Systemic Veins: The inferior vena cava appears dilated. There is IVC inspiratory collapse greater than 50%. In comparison to the previous echocardiogram(s): There are no prior studies on this patient for comparison purposes.  CONCLUSIONS:  1. Left ventricular systolic function is normal with a 60-65% estimated ejection fraction.  2. Aortic valve sclerosis. QUANTITATIVE DATA SUMMARY: 2D MEASUREMENTS:                          Normal Ranges: Ao Root d:     2.70 cm   (2.0-3.7cm) IVSd:          1.05 cm   (0.6-1.1cm) LVPWd:         1.03 cm   (0.6-1.1cm) LVIDd:         4.48 cm   (3.9-5.9cm) LVIDs:         2.89 cm LV Mass Index: 76.8 g/m2 LV % FS        35.5 % LA VOLUME:                               Normal Ranges: LA Vol A4C:        33.2 ml    (22+/-6mL/m2) LA Vol A2C:        37.5 ml LA Vol BP:         35.7 ml LA Vol Index A4C:  15.9ml/m2 LA Vol Index A2C:  17.9 ml/m2 LA Vol Index BP:   17.1 ml/m2 LA  Area A4C:       13.7 cm2 LA Area A2C:       14.4 cm2 LA Major Axis A4C: 4.8 cm LA Major Axis A2C: 4.7 cm LA Volume Index:   15.8 ml/m2 LA Vol A4C:        31.0 ml LA Vol A2C:        35.0 ml RA VOLUME BY A/L METHOD:                       Normal Ranges: RA Area A4C: 11.9 cm2 M-MODE MEASUREMENTS:                  Normal Ranges: Ao Root: 2.70 cm (2.0-3.7cm) AoV Exc: 1.70 cm (1.5-2.5cm) LAs:     4.20 cm (2.7-4.0cm) AORTA MEASUREMENTS:                      Normal Ranges: AoV Exc:     1.70 cm (1.5-2.5cm) Ao Sinus, d: 2.70 cm (2.1-3.5cm) Asc Ao, d:   2.90 cm (2.1-3.4cm) LV SYSTOLIC FUNCTION BY 2D PLANIMETRY (MOD):                     Normal Ranges: EF-A4C View: 65.9 % (>=55%) EF-A2C View: 61.4 % EF-Biplane:  64.1 % LV DIASTOLIC FUNCTION:                               Normal Ranges: MV Peak E:        1.14 m/s    (0.7-1.2 m/s) MV Peak A:        1.26 m/s    (0.42-0.7 m/s) E/A Ratio:        0.90        (1.0-2.2) MV e'             0.13 m/s    (>8.0) MV lateral e'     0.13 m/s MV medial e'      0.13 m/s MV A Dur:         114.00 msec E/e' Ratio:       8.77        (<8.0) PulmV Sys Maegd:    72.10 cm/s PulmV Bradley Maged:   53.30 cm/s PulmV S/D Maged:    1.10 PulmV A Revs Maged: 35.00 cm/s PulmV A Revs Dur: 124.00 msec MITRAL VALVE:                 Normal Ranges: MV DT: 240 msec (150-240msec) AORTIC VALVE:                                    Normal Ranges: AoV Vmax:                2.09 m/s  (<=1.7m/s) AoV Peak P.5 mmHg (<20mmHg) AoV Mean PG:             10.0 mmHg (1.7-11.5mmHg) LVOT Max Maged:            1.43 m/s  (<=1.1m/s) AoV VTI:                 38.90 cm  (18-25cm) LVOT VTI:                29.60 cm LVOT Diameter:           2.10 cm   (1.8-2.4cm) AoV Area, VTI:           2.64 cm2  (2.5-5.5cm2) AoV Area,Vmax:           2.37 cm2  (2.5-4.5cm2) AoV Dimensionless Index: 0.76  RIGHT VENTRICLE: RV Basal 2.62 cm RV Mid   2.05 cm RV Major 6.6 cm TAPSE:   23.0 mm RV s'    0.11 m/s TRICUSPID VALVE/RVSP:                             Normal  Ranges: Peak TR Velocity: 2.23 m/s RV Syst Pressure: 22.9 mmHg (< 30mmHg) TV E Vmax:        0.41 m/s  (0.3-0.7m/s) TV A Vmax:        0.52 m/s PULMONIC VALVE:                      Normal Ranges: PV Max Maged: 1.1 m/s  (0.6-0.9m/s) PV Max P.8 mmHg Pulmonary Veins: PulmV A Revs Dur: 124.00 msec PulmV A Revs Maged: 35.00 cm/s PulmV Bradley Maged:   53.30 cm/s PulmV S/D Maged:    1.10 PulmV Sys Maged:    72.10 cm/s  [id number] Saba Mendoza MD Electronically signed on 10/9/2023 at 10:24:15 AM  ** Final **     CT abdomen pelvis wo IV contrast    Result Date: 10/8/2023  Interpreted By:  Teodoro De, STUDY: CT ABDOMEN PELVIS WO IV CONTRAST;  10/8/2023 3:14 pm   INDICATION: Signs/Symptoms:worsening abdominal pain, rule out obstruction or Ileus.   COMPARISON: 10/06/2023.   ACCESSION NUMBER(S): [id number]   ORDERING CLINICIAN: LC ABDI   TECHNIQUE: CT of the abdomen and pelvis was performed. No contrast was administered. Coronal and sagittal reformations were made.   FINDINGS: There is motion artifact which degrades image detail.   LOWER CHEST: New irregular regions of consolidation and volume loss are present in the basilar aspect of the right lower lobe with additional small regions of atelectasis and or infiltrate in the lung bases. Trace pleural effusions are now seen.   ABDOMEN:   LIVER: No focal hepatic lesion is seen.   BILE DUCTS: Nondilated.   GALLBLADDER: Cholecystectomy changes are present.   PANCREAS: There is pancreatic parenchymal atrophy similar to prior.   SPLEEN: Within normal limits.   ADRENAL GLANDS: Within normal limits.   KIDNEYS AND URETERS: No renal or ureteral calculi are seen bilaterally.  No hydroureteronephrosis bilaterally.   Urinary bladder is decompressed by a Lord catheter. Small amount of gas is seen within the bladder related to the catheter.   VESSELS: Irregular atherosclerotic calcifications are seen in the aorta and branch vessels. No aortic aneurysm. Developmental left-sided SVC is noted.    BOWEL: There is moderate generalized dilation of small bowel containing fluid and gas as well as mild diffuse dilation of the colon also containing fluid and gas. There is mild wall thickening of the distal transverse colon. Appendix is not discretely identified.   No focal diverticular disease. Rectal tube with balloon is present.   PERITONEUM/RETROPERITONEUM/LYMPH NODES: There is mild generalized mesenteric edema. Small volume ascites is present. No organized fluid collection. No free intraperitoneal air.   No retroperitoneal fluid collection or lymphadenopathy.       ABDOMINAL WALL: Very small fat containing periumbilical hernia present without inflammation.   BONE AND SOFT TISSUE: There is generalized osteopenia with thoracolumbar dextroscoliosis. Multilevel disc space narrowing and endplate spurring/sclerosis is present throughout the spine. Facet arthrosis is greatest in the mid-lower lumbar spine. There is joint space narrowing and marginal spurring of both hips.       New irregular consolidation and volume loss of the right lower lobe with additional small areas of atelectasis and/or infiltrates of the lung bases as well as trace pleural effusions.   Moderate generalized dilation of small bowel containing gas and fluid as well as mild generalized dilation of the colon also containing fluid and gas. Mild wall thickening of the distal transverse colon. Findings may reflect ileus and nonspecific infectious or inflammatory enterocolitis.   Small volume ascites.   MACRO: None.   Signed by: Teodoro De 10/8/2023 4:55 PM Dictation workstation:   [id number]    XR abdomen 1 view    Result Date: 10/8/2023  Interpreted By:  Jeannie Costa, STUDY: XR ABDOMEN 1 VIEW;  10/8/2023 10:55 am   INDICATION: Signs/Symptoms:abdominal pain, distension.   COMPARISON:  image from abdominal CT 10/06/2023   ACCESSION NUMBER(S): [id number]   ORDERING CLINICIAN: LC ABDI   FINDINGS: 3 segmental AP portable supine views  of the abdomen obtained.   Artifact related to overlying monitoring leads. Distended gas-filled central abdominal small bowel loops increased from  image from abdominal CT 10/06/2023. Small amount of rectal gas. The stomach is relatively decompressed. No obvious abdominal mass effect. Can not accurately assess for free air on supine radiographs. Right upper quadrant surgical clips and moderate elevation of the right diaphragm similar to the previous exam. The included extreme left lung base is clear. Scoliosis and degenerative changes of the lumbar spine.       Gaseous distention of central abdominal small bowel loops increased since 10/06/2023 CT, could reflect worsening ileus or obstruction. Follow-up or further evaluation with CT as clinically warranted.   MACRO: None.   Signed by: Jeannie Costa 10/8/2023 11:14 AM Dictation workstation:   [id number]    XR chest 1 view    Result Date: 10/7/2023  Interpreted By:  Antonia Marsh, STUDY: Chest, single AP view.   INDICATION: Signs/Symptoms:Central Line re-positioning (repeat chest X-ray).   COMPARISON: Same day radiograph from 08/20 5 p.m.   ACCESSION NUMBER(S): [id number]   ORDERING CLINICIAN: KATHERINE BARRERA   FINDINGS: Right IJ central venous catheter with tip in the cavoatrial junction. Elevated right hemidiaphragm which is likely chronic. Atherosclerosis of the thoracic aorta noted. The cardiac silhouette size is within normal limits. There is no focal consolidation, edema or pneumothorax. No sizeable pleural effusion. No acute osseous abnormality.       1. No acute cardiopulmonary process. 2. Right IJ central venous catheter with tip in the cavoatrial junction. 3. Elevated right hemidiaphragm which is likely chronic.   MACRO: None.   Signed by: Antonia Marsh 10/7/2023 9:38 PM Dictation workstation:   [id number]    XR chest 1 view    Result Date: 10/7/2023  Interpreted By:  Antonia Marsh, STUDY: Chest, single AP view.   INDICATION:  Signs/Symptoms:confirm cvc placement.   COMPARISON: CT chest and chest radiograph 10/06/2023   ACCESSION NUMBER(S): [id number]   ORDERING CLINICIAN: URSULA SHAFFER   FINDINGS: Right IJ central venous catheter with tip in the proximal right atrium.   Elevated right hemidiaphragm which is likely chronic. Atherosclerosis of the thoracic aorta.   The cardiac silhouette size is within normal limits. There is mild diffuse prominence of the interstitial lung markings likely representing mild interstitial pulmonary edema. No sizeable pleural effusion. No acute osseous abnormality.       1. Mild diffuse prominence of the interstitial lung markings likely representing mild interstitial pulmonary edema. 2. Right IJ central venous catheter with tip in the proximal right atrium   MACRO: None.   Signed by: Antonia Marsh 10/7/2023 9:37 PM Dictation workstation:   [id number]    CT chest abdomen pelvis wo IV contrast    Result Date: 10/6/2023  Interpreted By:  Nomi Cruz, STUDY: CT CHEST ABDOMEN PELVIS WO CONTRAST;  10/6/2023 10:47 pm   INDICATION: Signs/Symptoms:To be, shock, likely septic shock, renal failure, severe lower quadrant abdominal pain, diarrhea for 1-month.   COMPARISON: None.   ACCESSION NUMBER(S): [id number]   ORDERING CLINICIAN: LUCHO MOODY   TECHNIQUE: CT of the chest, abdomen and pelvis was performed. Contiguous axial images were obtained at 3 mm slice thickness through the chest, abdomen and pelvis. Coronal and sagittal reconstructions at 3 mm slice thickness were performed.  No intravenous or oral contrast agents were administered.   FINDINGS: Please note that the study is limited without intravenous contrast.   CHEST:     LUNG/PLEURA/LARGE AIRWAYS: Trachea and central airways are patent, without evidence of endobronchial lesion. Small amount of mucus is present in the trachea and distal right mainstem bronchus.   Area of bandlike atelectasis is present in the right lower lobe, otherwise the  lungs are well aerated without evidence of focal consolidation, pleural effusion or pneumothorax. Additional small area of atelectasis is present in the right middle lobe, likely due to asymmetric elevation of the right diaphragm.   VESSELS: Aorta and pulmonary arteries are normal caliber.  Mild-to-moderate vascular calcifications are present in the thoracic aorta mild-to-moderate coronary artery calcifications are present.   HEART: Heart is normal in size. Small pericardial effusion is present.   MEDIASTINUM AND BART: No mediastinal, hilar or axillary lymph nodes are present.  Esophagus is unremarkable in appearance.   CHEST WALL AND LOWER NECK: Soft tissues of the chest wall do not demonstrate any acute abnormality. Thyroid is unremarkable in appearance.   ABDOMEN:   LIVER: Within limits of noncontrast exam, the liver is unremarkable in appearance.   BILE DUCTS: No intrahepatic biliary dilatation is present, with mild dilatation of the common bile duct likely due to previous cholecystectomy.   GALLBLADDER: Gallbladder is surgically absent.   PANCREAS: No pancreatic ductal dilatation or peripancreatic stranding is evident.   SPLEEN: Spleen is unremarkable in appearance.   ADRENAL GLANDS: Bilateral adrenal glands are unremarkable.   KIDNEYS AND URETERS: Kidneys are symmetric in size without evidence of hydronephrosis or nephrolithiasis bilaterally. Ureters are not dilated.   PELVIS:   BLADDER: Bladder is decompressed with Lord catheter in place.   REPRODUCTIVE ORGANS: No suspicious adnexal masses or fluid collections are identified.   BOWEL: Stomach is distended with food and water debris and is otherwise unremarkable. Small bowel is not dilated. Large bowel is fluid-filled without evidence of abnormal inflammatory wall thickening.   VESSELS: There is no aneurysmal dilatation of the abdominal aorta. The IVC is within normal limits. Extensive vascular calcifications are present in the abdominal aorta and iliac  arteries bilaterally.   PERITONEUM/RETROPERITONEUM/LYMPH NODES: There is no free or loculated fluid collection, no free intraperitoneal air.  The retroperitoneum appears unremarkable.  No abdominopelvic lymphadenopathy is present.   ABDOMINAL WALL: Two 2.5 cm linear structures (series 206, image 68) are present in the cutaneous tissues overlying the posterior elements of L1, with mild surrounding granulation/scar tissue. Several additional punctate calcific densities are present in the cutaneous tissues of the lower right back at the level of L3-L4 (series 201, image 139) with mild surrounding granulation tissue/scarring.   Cutaneous tissues of the abdominal wall are otherwise unremarkable in appearance without evidence of fluid collections or soft tissue gas.   BONES: There is mild dextroscoliotic curvature of the thoracolumbar spine with multilevel degenerative changes present in the lumbar spine due to combination of endplate spurring, hypertrophic facet changes and ligamentum flavum thickening, with likely at least mild-to-moderate stenosis present at the levels of L3-L4, L4-L5 and L5-S1.   Postsurgical changes of ACDF of the lower cervical spine are partially visualized.       1. Large bowel is fluid-filled, without evidence of associated inflammatory wall thickening. Correlate with clinical symptoms of colitis. 2. No additional acute infectious/inflammatory changes are present in the abdomen and pelvis. 3. Small pericardial effusion. 4. Two 2.5 cm linear structures are present in the cutaneous tissues overlying the posterior elements of L1, with mild surrounding scarring/granulation tissue, with several additional punctate densities present in the more superficial cutaneous tissues of the right lower back with surrounding granulation/scarring. Correlate with surgical history.     MACRO: None   Signed by: Nomi Cruz 10/6/2023 11:08 PM Dictation workstation:   [id number]    XR chest 1 view    Result  Date: 10/6/2023  Interpreted By:  Ihsan Allen, STUDY: XR CHEST 1 VIEW;  10/6/2023 9:39 pm   INDICATION: Signs/Symptoms:hypoxia.   COMPARISON: None.   ACCESSION NUMBER(S): [id number]   ORDERING CLINICIAN: LUCHO MOODY   FINDINGS: AP radiograph of the chest was provided.   Limited by portable technique and patient soft tissue attenuation factors. Leads overlie the chest, partially obscuring the field-of-view.   CARDIOMEDIASTINAL SILHOUETTE: Cardiac silhouette is normal size. Atherosclerotic calcification of the aortic arch.   LUNGS: Lungs are clear. No pleural effusion or pneumothorax. Asymmetric elevation of the right hemidiaphragm.   ABDOMEN: No remarkable upper abdominal findings.   BONES: No acute osseous changes.       1.  No evidence of acute cardiopulmonary process.       MACRO: None   Signed by: Ihsan Allen 10/6/2023 9:47 PM Dictation workstation:   [id number]       Assessment/Plan   Principal Problem:    Septic shock (CMS/HCC)  Active Problems:    CHF (congestive heart failure) (CMS/HCC)    Sepsis, pending blood cultures  - continue zosyn     Abdominal pain / Chronic diarrhea likely due to alcohol usage  - C. Diff negative  - Giardia, cryptosporidium, stool pathogen panel, calprotectin pending  - on CIWA     Respiratory failure / COPD/ atelectasis  - monitor    Leg stasis  - stopping vancomycin    Kj Lopez DO  PGY-I Internal Medicine        Attending note : the patient was evaluated, the note was reviewed and up dated  Sepsis, the BC is negative  Abdominal pain / diarrhea, C. Diff is negative  Respiratory failure / COPD / atelectasis  Legs stasis  Encephalopathy   Recommendations :  Continue Zosyn   Can discontinue Vancomycin     Lizbeth Gacria MD

## 2023-10-09 NOTE — CARE PLAN
Problem: Diabetes  Goal: Learn about and adhere to nutrition recommendations by end of shift  Outcome: Progressing   The patient's goals for the shift include      The clinical goals for the shift include have pain controlled <5 through the night    Over the shift, the patient did not make progress toward the following goals. Barriers to progression include refusal to eat. Recommendations to address these barriers include educate the need for solid food.

## 2023-10-09 NOTE — PROGRESS NOTES
"  Vancomycin Dosing by Pharmacy- Cessation of Therapy    Consult to pharmacy for vancomycin dosing has been discontinued by the prescriber, pharmacy will sign off at this time.    Please call pharmacy if there are further questions or re-enter a consult if vancomycin is resumed.     Jenise De La O, Former[facility]         Vancomycin Dosing by Pharmacy- FOLLOW UP    Keisha Santa is a 62 y.o. year old female who Pharmacy has been consulted for vancomycin dosing for cellulitis, skin and soft tissue. Based on the patient's indication and renal status this patient is being dosed based on a goal AUC of 400-600.     Renal function is currently declining.    Current vancomycin dose: 1000 mg given every q12 hours    Estimated vancomycin AUC on current dose: 418 mg/L.hr     Visit Vitals  /76   Pulse 94   Temp 37 °C (98.6 °F)   Resp 18        Lab Results   Component Value Date    CREATININE 0.55 10/09/2023    CREATININE 0.77 10/08/2023    CREATININE 1.17 (H) 10/07/2023    CREATININE 1.56 (H) 10/07/2023        Patient weight is 113    No results found for: \"CULTURE\"     I/O last 3 completed shifts:  In: 9551.9 (85.1 mL/kg) [I.V.:2701.9 (24.1 mL/kg); IV Piggyback:6850]  Out: 9975 (88.8 mL/kg) [Urine:8275 (2 mL/kg/hr); Stool:1700]  Weight: 112.3 kg   [unfilled]    Lab Results   Component Value Date    PATIENTTEMP 37.0 10/07/2023    PATIENTTEMP 37.0 10/06/2023        Assessment/Plan    Within AUC goal on lower side, with improving kidney function will increase dose to 1250mg q12     This dosing regimen is predicted by [employer] to result in the following pharmacokinetic parameters:  Regimen: 1250 mg IV every 12 hours.  Start time: 20:12 on 10/09/2023  Exposure target: AUC24 (range)400-600 mg/L.hr   AUC24,ss: 515 mg/L.hr  Probability of AUC24 > 400: 95 %  Ctrough,ss: 18.1 mg/L  Probability of Ctrough,ss > 20: 30 %  Probability of nephrotoxicity (Lodise VICTORIA 2009): 14 %      The next level will be obtained on 9/10 at 0500. May be " obtained sooner if clinically indicated.   Will continue to monitor renal function daily while on vancomycin and order serum creatinine at least every 48 hours if not already ordered.  Follow for continued vancomycin needs, clinical response, and signs/symptoms of toxicity.       Jenise De La O, [facility]

## 2023-10-09 NOTE — PROGRESS NOTES
Keisha Santa is a 62 y.o. female on day 2 of admission presenting with Septic shock (CMS/HCC).      Subjective   Patient was seen resting comfortably in bed this morning.  Overnight she had a one time fever of 38.3C at 8pm for which she received Tylenol.  She denies feeling feverish at that time, and states that she is feeling a little better this morning.  She rates her abdominal pain as an 8/10 improved from 9/10, but continues to have decreased appetite and nausea.  She denies any other associated symptoms such as chest pain, shortness of breath, cough, wheezing, or vomiting.    Objective     Last Recorded Vitals  /76   Pulse 94   Temp 37 °C (98.6 °F)   Resp 18   Wt 113 kg (248 lb 0.3 oz)   SpO2 97%   Intake/Output last 3 Shifts:    Intake/Output Summary (Last 24 hours) at 10/9/2023 1014  Last data filed at 10/9/2023 0526  Gross per 24 hour   Intake 5395.11 ml   Output 5550 ml   Net -154.89 ml       Admission Weight  Weight: 109 kg (241 lb 2.9 oz) (Simultaneous filing. User may not have seen previous data.) (10/07/23 0430)    Daily Weight  10/09/23 : 113 kg (248 lb 0.3 oz)    Image Results  Transthoracic Echo (TTE) Complete    Result Date: 10/9/2023     CONCLUSIONS:  1. Left ventricular systolic function is normal with a 60-65% estimated ejection fraction. Left ventricular diastolic filling was indeterminate.   2. Aortic valve sclerosis.      CT abdomen pelvis wo IV contrast    Result Date: 10/8/2023    New irregular consolidation and volume loss of the right lower lobe with additional small areas of atelectasis and/or infiltrates of the lung bases as well as trace pleural effusions.   Moderate generalized dilation of small bowel containing gas and fluid as well as mild generalized dilation of the colon also containing fluid and gas. Mild wall thickening of the distal transverse colon. Findings may reflect ileus and nonspecific infectious or inflammatory enterocolitis.   Small volume ascites.      XR abdomen 1 view    Result Date: 10/8/2023    Gaseous distention of central abdominal small bowel loops increased since 10/06/2023 CT, could reflect worsening ileus or obstruction. Follow-up or further evaluation with CT as clinically warranted.       Physical Exam  Constitutional:       Appearance: She is obese.      Comments: Resting comfortably in bed in no acute distress   HENT:      Head: Normocephalic and atraumatic.   Eyes:      Extraocular Movements: Extraocular movements intact.      Conjunctiva/sclera: Conjunctivae normal.      Pupils: Pupils are equal, round, and reactive to light.   Neck:      Comments: Right internal jugular Triple lumen catheter  Cardiovascular:      Comments: Mild tachycardia, regular rhythm. No murmurs or rubs  Pulmonary:      Effort: Pulmonary effort is normal. No respiratory distress.      Breath sounds: Normal breath sounds. No wheezing, rhonchi or rales.      Comments: Breathing comfortably on 2L NC  Abdominal:      Palpations: Abdomen is soft.      Tenderness: There is abdominal tenderness. There is guarding.   Genitourinary:     Comments: Lord and rectal tube in place  Musculoskeletal:      Comments: Tenderness at left lower leg, present of open wound   Neurological:      General: No focal deficit present.      Mental Status: She is alert and oriented to person, place, and time.      Cranial Nerves: No cranial nerve deficit.         Relevant Results  Scheduled medications  aspirin, 81 mg, oral, Daily  atorvastatin, 40 mg, oral, Daily  influenza, 0.5 mL, intramuscular, During hospitalization  fluticasone furoate-vilanteroL, 1 puff, inhalation, Daily  folic acid, 1 mg, oral, Daily  gabapentin, 400 mg, oral, BID  heparin (porcine), 7,500 Units, subcutaneous, q8h STEVE  insulin lispro, 0-10 Units, subcutaneous, TID with meals  magnesium sulfate, 2 g, intravenous, Once  montelukast, 10 mg, oral, Daily  multivitamin with minerals, 1 tablet, oral, Daily  nystatin, , Topical,  BID  piperacillin-tazobactam, 4.5 g, intravenous, q6h  potassium phosphate, 21 mmol, intravenous, Once  vancomycin, 1,250 mg, intravenous, q12h      Continuous medications  sodium chloride, 150 mL/hr, Last Rate: 100 mL/hr (10/09/23 0526)      PRN medications  PRN medications: acetaminophen, albuterol, albuterol, dextrose 10 % in water (D10W), dextrose, diclofenac sodium, EPINEPHrine, fluticasone, glucagon, LORazepam **OR** LORazepam **OR** LORazepam, oxyCODONE, oxyCODONE-acetaminophen, zinc oxide  Results for orders placed or performed during the hospital encounter of 10/07/23 (from the past 24 hour(s))   POCT GLUCOSE   Result Value Ref Range    POCT Glucose 113 (H) 74 - 99 mg/dL   POCT GLUCOSE   Result Value Ref Range    POCT Glucose 97 74 - 99 mg/dL   Vancomycin   Result Value Ref Range    Vancomycin 12.7 5.0 - 20.0 ug/mL   CBC   Result Value Ref Range    WBC 10.6 4.4 - 11.3 x10*3/uL    nRBC 0.0 0.0 - 0.0 /100 WBCs    RBC 2.57 (L) 4.00 - 5.20 x10*6/uL    Hemoglobin 8.0 (L) 12.0 - 16.0 g/dL    Hematocrit 25.0 (L) 36.0 - 46.0 %    MCV 97 80 - 100 fL    MCH 31.1 26.0 - 34.0 pg    MCHC 32.0 32.0 - 36.0 g/dL    RDW 14.7 (H) 11.5 - 14.5 %    Platelets 293 150 - 450 x10*3/uL    MPV 9.1 7.5 - 11.5 fL   Renal function panel   Result Value Ref Range    Glucose 89 74 - 99 mg/dL    Sodium 134 (L) 136 - 145 mmol/L    Potassium 4.1 3.5 - 5.3 mmol/L    Chloride 104 98 - 107 mmol/L    Bicarbonate 22 21 - 32 mmol/L    Anion Gap 12 10 - 20 mmol/L    Urea Nitrogen 3 (L) 6 - 23 mg/dL    Creatinine 0.55 0.50 - 1.05 mg/dL    eGFR >90 >60 mL/min/1.73m*2    Calcium 7.9 (L) 8.6 - 10.3 mg/dL    Phosphorus 1.9 (L) 2.5 - 4.9 mg/dL    Albumin 2.7 (L) 3.4 - 5.0 g/dL   Magnesium   Result Value Ref Range    Magnesium 1.50 (L) 1.60 - 2.40 mg/dL   Transthoracic Echo (TTE) Complete   Result Value Ref Range    LV A4C EF 65.9          Assessment/Plan   Keisha Santa is a 62 y.o. female on day 2 of admission with PMH of HTN, HLD, T2DM, MUKESH  non-compliant with CPAP, COPD, Parkinsonism, cervical cancer, 2 CVAs, HFpEF w/ EF 65%, chronic diarrhea, PVD, and chronic alcoholism who is admitted to the ICU for septic shock 2/2 colitis, cellulitis, and possible UTI.      Neurology:  #Acute metabolic encephalopathy likely 2/2 sepsis (improving)  - Patient remains A and O x3, improved from admission  - Continue to monitor mental status, and anticipate continued improvement with current medical management     #Chronic alcoholism   - CIWA protocol in place. Last ativan dose 1mg @ 0142 10/8.  - Continuing folic acid and multivitamin     #Parkinsonism:  - Patient continues to show tremors, however the patient is not on any home regimen for Parkinson's disease. Will continue to monitor.     Cardiovascular:  #Shock  #Septic vs Hypovolemia 2/2 Colitis, possible cellulitis and UTI  - Most recent lactate on 10/8 1.4 and stable  - Levophed was discontinued @ 0800 on 10/8   -Patient's BP's have been stable off Levo in 100s/60s  - Increased maintenance fluids from 100mL/hr 0.45NaCl to 150mL/hr given output 5.8L urine last 24hr  - Morning cortisol on 10/8 was 17 and wnl. Adrenal insufficiency less likely cause of polyuria.  - Fluid boluses PRN. Received 3L NS and 3L LR on 10/8. Net 1L negative last 24hr.     #HTN/CHF   - Holding home HTN and CHF meds: Per chart review, patient recently filled a prescription for carvedilol 25 mg, amlodipine 10 mg, and spironolactone 50 mg. She used to take diltiazem, bumetanide, and lisinopril in March 2022  - Patient most recently had an echo in May 2023, however no EF is reported. Most recent EF on file is 65% in May 2018.  - Echo 10/9 showed: Normal LV systolic function 60-65% EF. LV diastolic filling indeterminate. Aortic valve sclerosis     #HLD/ CVA  - Continue ASA 81 mg and atorvastatin 40 mg daily.        Pulmonary:  #Acute hypoxic respiratory failure 2/2 sepsis and possible COPD exacerbation (Resolved)  - Patient desaturating into high  80s this AM on RA. Placed on 2L NC, saturating well.  - Continue to wean O2 to maintain SpO2 > 88%  - Continue home albuterol inhalers PRN, Breo Ellipta scheduled, montelukast 10 mg.   - CT Abd/Pelvis 10/8: New irregular consolidation of right lower lobe with small areas of atelectasis and/or infiltrates. Trace pleural effusions   -Atelectasis and consolidation most likely due to poor function of right hemidiaphragm in addition to patient's body habitus and supine positioning  - Encourage bronchial hygiene and incentive spirometry use    Gastrointestinal:  #Chronic diarrhea:  #Colitis, rule out C. diff:  #Colitis with potential ileus  - C. Diff negative.   - Stool PCR, calprotectin, cyclospora pending  - Rectal tube in place  - CT Abd/Pelvis 10/8: Moderate generalized dilation of small bowel containing gas and fluid. Mild generalized dilation of of the colon also containing fluid and gas. Mild wall thickening of distal transverse colon. May reflect ileus and nonspecific infectious or inflammatory enterocolitis.  - Diet changed to Clear Liquids, as patient had worsening abdominal pain after eating lunch and concerns for potential ileus  - Will repeat Abdominal Xray if stool output decreases     #Elevated lipase, concern for acute pancreatitis  - Lipase level 155 on admission  - CT abdomen/pelvis did not show concerning signs of pancreatitis  - Fecal elastase pending     Renal/Urology:  #ITALO 2/2 sepsis and hypovolemia in the setting of chronic diarrhea and decreased PO intake (resolved)  #Polyuria most likely 2/2 resolving ITALO  - Cr/BUN 2.12/37 on admission  - ITALO has resolved with most recent Cr 0.55 and BUN 3  - Urine output 5.8L last 24 hours. Continue to monitor polyuria.  - Urine electrolytes ordered     #Hyponatremia:  - Avoid rapid correction no more than 8 points per day  - Improving.. 118=>123=>124=>132=>134 on 10/9     #Metabolic acidosis (resolved)   - On admission VBG 7.28/39/29/18  - Repeat VBG showed pH  7.31/pCO2 35/pO2 41/Bicarb 17.6/Lactate 1.4  - Continuing management     #Hyperkalemia (Resolved)     #Hypomagnesemia  - Magnesium 1.5 from 1.9 on 10/9  - Repleted with 2g magnesium sulfate IV. Continue to monitor on AM Labs    #Hypophosphatemia  - Phosphorous 1.9 from 2.3 on 10/9  - Repleted with 21mmol IV Potassium Phosphate. Continue to monitor on AM Labs.     Hematology/Oncology:  #Leukocytosis 2/2 sepsis  - Leukocytosis has improved. Most likely 2/2 dehydration and hemoconcentration. CTM.     Infectious Disease:  #Septic shock 2/2 colitis, cellulitis, possible UTI  - Discontinued ceftriaxone and metronidazole (10/7 - 10/7)  - Due to concern for C. Diff, patient was started on PO vancomycin  - PO vanc discontinued due to C. Diff being negative  - Starting IV vancomycin and Zosyn (10/7 - )  - ID consulted: Recommend discontinuing vanc as skin changes more likely a result of venous stasis vs cellulitis. Continue Zosyn.  - IV Vancomycin discontinued 10/9   - Wound care consulted, appreciate recs  - Blood cultures negative 1 day growth on 10/9  - Urine culture negative     Endocrine:  #T2DM  - Mild SSI      Musculoskeletal/Skin:  - See ID section     Psychiatric:  - No active issues.     Fluids: PO PRN IV  Electrolytes: Replete as needed.  Nutrition: Clear liquid diet  GI prophylaxis: None  VTE prophylaxis: Heparin subcutaneous   Abx: Ceftriaxone and metronidazole (10/7 - 10/7). PO vancomycin (10/7 - 10/7). IV vancomycin and Zosyn (10/7 - )  Code Status: DNR, needs to be revised with the patient and family when she improves.  Lines/Tubes: PIV, Right internal jugular CVC (to be discontinued when transferred to floors)     Disposition: Patient admitted with septic shock likely secondary to colitis. On Vanc/Zosyn. Patient no longer requiring Levophed, but has potential ileus and polyuria. Patient will remain in ICU.       Rick Betancur MD

## 2023-10-09 NOTE — PROGRESS NOTES
Physical Therapy    Physical Therapy Treatment    Patient Name: Keisha Santa  MRN: [id number]  Today's Date: 10/9/2023  Time Calculation  Start Time: 1418  Stop Time: 1437  Time Calculation (min): 19 min       Assessment/Plan   PT Assessment  PT Assessment Results: Decreased endurance, Decreased strength, Decreased mobility, Decreased cognition, Obesity, Pain  Rehab Prognosis: Good  Barriers to Discharge: Pt lives in [place]  Evaluation/Treatment Tolerance: Patient limited by fatigue, Patient limited by pain  Medical Staff Made Aware: Yes  Barriers to Participation:  (Increase pain with movement)  End of Session Communication: Bedside nurse  Assessment Comment: Pt is a 78 yo female admitted with weakness and diarrhea. She presents with weakness and decreased endurance limiting her mobility which is alos limited by her abdominal pain. Pt would benefit from PT services at this time for strengthing and mobility trainiing to improve endurance and return towards prior level of function.  End of Session Patient Position: Bed, 3 rail up  PT Plan  Inpatient/Swing Bed or Outpatient: Inpatient  PT Plan  Treatment/Interventions: Bed mobility, Transfer training, Gait training, Balance training, Strengthening, Endurance training, Therapeutic activity, Therapeutic exercise, Home exercise program  PT Plan: Skilled PT  PT Frequency: 3 times per week  PT Discharge Recommendations: Moderate intensity level of continued care      General Visit Information:   PT  Visit  PT Received On: 10/09/23  General  Reason for Referral: Impaired Mobility (Co Tx next session.)  Past Medical History Relevant to Rehab: Weakness, Diarreha (Patient states she has pain in her rectal area and abdominal pain. Patient is very self limiting. Patient requires a co tx due to patient's increased pain and lack of being able to  particpate in therapy.)  Prior to Session Communication: Bedside nurse  Patient Position Received: Bed, 3 rail up  General Comment:  CO tx next session    Subjective   Precautions:     Vital Signs:       Objective   Pain:  Pain Assessment  Pain Assessment: 0-10  Pain Score:  (Intermittent pain with slight movement. Pain increases to a 10 with positional changes.)  Pain Type: Acute pain  Pain Location: Buttocks  Cognition:  Cognition  Overall Cognitive Status: Within Functional Limits  Orientation Level: Oriented X4  Postural Control:     Extremity/Trunk Assessments:    Activity Tolerance:     Treatments:  Therapeutic Exercise  Therapeutic Exercise Performed: Yes  Therapeutic Exercise Activity 1: Supine ther ex  B LE (AP, QS, HS, SAQ x 10    Patient unable to complete all exercises secondary to increase in abdominal and rectal pain.)         Bed Mobility 1  Bed Mobility 1: Scooting (Attempted supine>sit. Patient unable to complete.)  Level of Assistance 1: Maximum assistance  Bed Mobility Comments 1: MaxA x 2  Bed Mobility 2  Bed Mobility Comments 2: Attempted supine > sit (Patient unable to complete for EOB sitting.)         Outcome Measures:  [facility] Basic Mobility  Turning from your back to your side while in a flat bed without using bedrails: A lot  Moving from lying on your back to sitting on the side of a flat bed without using bedrails: Total  Moving to and from bed to chair (including a wheelchair): Total  Standing up from a chair using your arms (e.g. wheelchair or bedside chair): Total  To walk in hospital room: Total  Climbing 3-5 steps with railing: Total  Basic Mobility - Total Score: 7    Education Documentation  Home Exercise Program, taught by Fabiana Salcedo PTA at 10/9/2023  3:06 PM.  Learner: Patient  Readiness: Acceptance  Method: Explanation  Response: Verbalizes Understanding, Needs Reinforcement    Education Comments  No comments found.        OP EDUCATION:  Education  Individual(s) Educated: Patient  Education Provided: Home Exercise Program  Diagnosis and Precautions: ICU monitor, IV via Central Venous Line R side. 2L O2,  Catheter; Lord  Risk and Benefits Discussed with Patient/Caregiver/Other: yes  Patient/Caregiver Demonstrated Understanding: yes  Plan of Care Discussed and Agreed Upon: yes  Patient Response to Education: Patient/Caregiver Verbalized Understanding of Information  Education Comment: Educated patient on the benefits of particpating in therapy.    Encounter Problems       Encounter Problems (Active)       Mobility       STG - Patient will ambulate at least 25' with FWW CGA (Progressing)       Start:  10/08/23    Expected End:  10/22/23               Transfers       STG - Patient to transfer to and from sit to supine with CGAx1 (Progressing)       Start:  10/08/23    Expected End:  10/22/23            STG - Patient will perform bed mobility with SBA (Progressing)       Start:  10/08/23    Expected End:  10/22/23            STG - Patient will roll SBA (Progressing)       Start:  10/08/23    Expected End:  10/22/23            STG - Patient will transfer sit to and from stand CGAx1 with FWW (Progressing)       Start:  10/08/23    Expected End:  10/22/23

## 2023-10-10 ENCOUNTER — APPOINTMENT (OUTPATIENT)
Dept: RADIOLOGY | Facility: HOSPITAL | Age: 63
End: 2023-10-10
Payer: MEDICAID

## 2023-10-10 LAB
ALBUMIN SERPL BCP-MCNC: 2.7 G/DL (ref 3.4–5)
ANION GAP SERPL CALC-SCNC: 10 MMOL/L (ref 10–20)
ARTERIAL PATENCY WRIST A: ABNORMAL
BASE EXCESS BLDA CALC-SCNC: -2.4 MMOL/L (ref -2–3)
BASOPHILS # BLD AUTO: 0.08 X10*3/UL (ref 0–0.1)
BASOPHILS NFR BLD AUTO: 0.9 %
BODY TEMPERATURE: 37 DEGREES CELSIUS
BUN SERPL-MCNC: 3 MG/DL (ref 6–23)
CALCIUM SERPL-MCNC: 8.1 MG/DL (ref 8.6–10.3)
CHLORIDE SERPL-SCNC: 105 MMOL/L (ref 98–107)
CO2 SERPL-SCNC: 26 MMOL/L (ref 21–32)
CREAT SERPL-MCNC: 0.53 MG/DL (ref 0.5–1.05)
EOSINOPHIL # BLD AUTO: 0.04 X10*3/UL (ref 0–0.7)
EOSINOPHIL NFR BLD AUTO: 0.4 %
ERYTHROCYTE [DISTWIDTH] IN BLOOD BY AUTOMATED COUNT: 15 % (ref 11.5–14.5)
GFR SERPL CREATININE-BSD FRML MDRD: >90 ML/MIN/1.73M*2
GLUCOSE BLD MANUAL STRIP-MCNC: 139 MG/DL (ref 74–99)
GLUCOSE BLD MANUAL STRIP-MCNC: 152 MG/DL (ref 74–99)
GLUCOSE BLD MANUAL STRIP-MCNC: 162 MG/DL (ref 74–99)
GLUCOSE BLD MANUAL STRIP-MCNC: 174 MG/DL (ref 74–99)
GLUCOSE BLD MANUAL STRIP-MCNC: 86 MG/DL (ref 74–99)
GLUCOSE SERPL-MCNC: 89 MG/DL (ref 74–99)
HCO3 BLDA-SCNC: 23.2 MMOL/L (ref 22–26)
HCT VFR BLD AUTO: 24.2 % (ref 36–46)
HGB BLD-MCNC: 7.8 G/DL (ref 12–16)
IMM GRANULOCYTES # BLD AUTO: 0.07 X10*3/UL (ref 0–0.7)
IMM GRANULOCYTES NFR BLD AUTO: 0.8 % (ref 0–0.9)
INHALED O2 CONCENTRATION: 50 %
LYMPHOCYTES # BLD AUTO: 1.39 X10*3/UL (ref 1.2–4.8)
LYMPHOCYTES NFR BLD AUTO: 15.5 %
MAGNESIUM SERPL-MCNC: 1.94 MG/DL (ref 1.6–2.4)
MCH RBC QN AUTO: 31.2 PG (ref 26–34)
MCHC RBC AUTO-ENTMCNC: 32.2 G/DL (ref 32–36)
MCV RBC AUTO: 97 FL (ref 80–100)
MONOCYTES # BLD AUTO: 0.56 X10*3/UL (ref 0.1–1)
MONOCYTES NFR BLD AUTO: 6.2 %
NEUTROPHILS # BLD AUTO: 6.85 X10*3/UL (ref 1.2–7.7)
NEUTROPHILS NFR BLD AUTO: 76.2 %
NRBC BLD-RTO: 0 /100 WBCS (ref 0–0)
OXYHGB MFR BLDA: 95.3 % (ref 94–98)
PCO2 BLDA: 42 MM HG (ref 38–42)
PH BLDA: 7.35 PH (ref 7.38–7.42)
PHOSPHATE SERPL-MCNC: 2.7 MG/DL (ref 2.5–4.9)
PLATELET # BLD AUTO: 319 X10*3/UL (ref 150–450)
PMV BLD AUTO: 9.2 FL (ref 7.5–11.5)
PO2 BLDA: 75 MM HG (ref 85–95)
POTASSIUM SERPL-SCNC: 3.9 MMOL/L (ref 3.5–5.3)
RBC # BLD AUTO: 2.5 X10*6/UL (ref 4–5.2)
SAO2 % BLDA: 96 % (ref 94–100)
SODIUM SERPL-SCNC: 137 MMOL/L (ref 136–145)
STAPHYLOCOCCUS SPEC CULT: NORMAL
WBC # BLD AUTO: 9 X10*3/UL (ref 4.4–11.3)

## 2023-10-10 PROCEDURE — 71275 CT ANGIOGRAPHY CHEST: CPT

## 2023-10-10 PROCEDURE — 85025 COMPLETE CBC W/AUTO DIFF WBC: CPT

## 2023-10-10 PROCEDURE — 71045 X-RAY EXAM CHEST 1 VIEW: CPT | Performed by: RADIOLOGY

## 2023-10-10 PROCEDURE — 71275 CT ANGIOGRAPHY CHEST: CPT | Performed by: RADIOLOGY

## 2023-10-10 PROCEDURE — 2500000004 HC RX 250 GENERAL PHARMACY W/ HCPCS (ALT 636 FOR OP/ED)

## 2023-10-10 PROCEDURE — 94640 AIRWAY INHALATION TREATMENT: CPT

## 2023-10-10 PROCEDURE — 2500000002 HC RX 250 W HCPCS SELF ADMINISTERED DRUGS (ALT 637 FOR MEDICARE OP, ALT 636 FOR OP/ED)

## 2023-10-10 PROCEDURE — 2550000001 HC RX 255 CONTRASTS: Performed by: INTERNAL MEDICINE

## 2023-10-10 PROCEDURE — 2500000001 HC RX 250 WO HCPCS SELF ADMINISTERED DRUGS (ALT 637 FOR MEDICARE OP)

## 2023-10-10 PROCEDURE — 82805 BLOOD GASES W/O2 SATURATION: CPT

## 2023-10-10 PROCEDURE — 94664 DEMO&/EVAL PT USE INHALER: CPT

## 2023-10-10 PROCEDURE — 99233 SBSQ HOSP IP/OBS HIGH 50: CPT

## 2023-10-10 PROCEDURE — 1200000002 HC GENERAL ROOM WITH TELEMETRY DAILY

## 2023-10-10 PROCEDURE — 36600 WITHDRAWAL OF ARTERIAL BLOOD: CPT

## 2023-10-10 PROCEDURE — 96372 THER/PROPH/DIAG INJ SC/IM: CPT

## 2023-10-10 PROCEDURE — 83735 ASSAY OF MAGNESIUM: CPT

## 2023-10-10 PROCEDURE — 80069 RENAL FUNCTION PANEL: CPT

## 2023-10-10 PROCEDURE — 71045 X-RAY EXAM CHEST 1 VIEW: CPT | Mod: FY

## 2023-10-10 PROCEDURE — 82947 ASSAY GLUCOSE BLOOD QUANT: CPT

## 2023-10-10 PROCEDURE — 94760 N-INVAS EAR/PLS OXIMETRY 1: CPT

## 2023-10-10 PROCEDURE — 2500000001 HC RX 250 WO HCPCS SELF ADMINISTERED DRUGS (ALT 637 FOR MEDICARE OP): Performed by: INTERNAL MEDICINE

## 2023-10-10 PROCEDURE — 2500000002 HC RX 250 W HCPCS SELF ADMINISTERED DRUGS (ALT 637 FOR MEDICARE OP, ALT 636 FOR OP/ED): Performed by: INTERNAL MEDICINE

## 2023-10-10 PROCEDURE — 5A0935A ASSISTANCE WITH RESPIRATORY VENTILATION, LESS THAN 24 CONSECUTIVE HOURS, HIGH NASAL FLOW/VELOCITY: ICD-10-PCS | Performed by: INTERNAL MEDICINE

## 2023-10-10 PROCEDURE — 2060000001 HC INTERMEDIATE ICU ROOM DAILY

## 2023-10-10 PROCEDURE — 37799 UNLISTED PX VASCULAR SURGERY: CPT

## 2023-10-10 RX ORDER — SPIRONOLACTONE 25 MG/1
50 TABLET ORAL DAILY
Status: DISCONTINUED | OUTPATIENT
Start: 2023-10-10 | End: 2023-10-12

## 2023-10-10 RX ORDER — LIDOCAINE HYDROCHLORIDE 10 MG/ML
5 INJECTION INFILTRATION; PERINEURAL ONCE
Status: DISCONTINUED | OUTPATIENT
Start: 2023-10-10 | End: 2023-10-16 | Stop reason: HOSPADM

## 2023-10-10 RX ORDER — TRAZODONE HYDROCHLORIDE 50 MG/1
150 TABLET ORAL NIGHTLY
Status: DISCONTINUED | OUTPATIENT
Start: 2023-10-10 | End: 2023-10-16 | Stop reason: HOSPADM

## 2023-10-10 RX ORDER — CARVEDILOL 25 MG/1
25 TABLET ORAL
Status: DISCONTINUED | OUTPATIENT
Start: 2023-10-10 | End: 2023-10-12

## 2023-10-10 RX ORDER — AMLODIPINE BESYLATE 10 MG/1
10 TABLET ORAL DAILY
Status: DISCONTINUED | OUTPATIENT
Start: 2023-10-10 | End: 2023-10-12

## 2023-10-10 RX ADMIN — FOLIC ACID 1 MG: 1 TABLET ORAL at 09:07

## 2023-10-10 RX ADMIN — RUGBY ZINC OXIDE 20% 1 APPLICATION: 20 OINTMENT TOPICAL at 20:39

## 2023-10-10 RX ADMIN — GABAPENTIN 400 MG: 400 CAPSULE ORAL at 20:15

## 2023-10-10 RX ADMIN — MULTIPLE VITAMINS W/ MINERALS TAB 1 TABLET: TAB at 09:08

## 2023-10-10 RX ADMIN — NYSTATIN: 100000 CREAM TOPICAL at 22:49

## 2023-10-10 RX ADMIN — CARVEDILOL 25 MG: 12.5 TABLET, FILM COATED ORAL at 20:19

## 2023-10-10 RX ADMIN — MONTELUKAST 10 MG: 10 TABLET, FILM COATED ORAL at 09:07

## 2023-10-10 RX ADMIN — PIPERACILLIN SODIUM AND TAZOBACTAM SODIUM 4.5 G: 4; .5 INJECTION, SOLUTION INTRAVENOUS at 11:41

## 2023-10-10 RX ADMIN — TRAZODONE HYDROCHLORIDE 150 MG: 50 TABLET ORAL at 20:16

## 2023-10-10 RX ADMIN — ENOXAPARIN SODIUM 40 MG: 40 INJECTION SUBCUTANEOUS at 20:18

## 2023-10-10 RX ADMIN — ATORVASTATIN CALCIUM 40 MG: 40 TABLET, FILM COATED ORAL at 20:16

## 2023-10-10 RX ADMIN — PIPERACILLIN SODIUM AND TAZOBACTAM SODIUM 4.5 G: 4; .5 INJECTION, SOLUTION INTRAVENOUS at 04:15

## 2023-10-10 RX ADMIN — ENOXAPARIN SODIUM 40 MG: 40 INJECTION SUBCUTANEOUS at 09:06

## 2023-10-10 RX ADMIN — PIPERACILLIN SODIUM AND TAZOBACTAM SODIUM 4.5 G: 4; .5 INJECTION, SOLUTION INTRAVENOUS at 22:49

## 2023-10-10 RX ADMIN — ALBUTEROL SULFATE 2.5 MG: 2.5 SOLUTION RESPIRATORY (INHALATION) at 10:10

## 2023-10-10 RX ADMIN — OXYCODONE HYDROCHLORIDE AND ACETAMINOPHEN 1 TABLET: 5; 325 TABLET ORAL at 14:04

## 2023-10-10 RX ADMIN — GABAPENTIN 400 MG: 400 CAPSULE ORAL at 09:07

## 2023-10-10 RX ADMIN — ALBUTEROL SULFATE 2.5 MG: 2.5 SOLUTION RESPIRATORY (INHALATION) at 14:45

## 2023-10-10 RX ADMIN — ASPIRIN 81 MG: 81 TABLET, COATED ORAL at 09:07

## 2023-10-10 RX ADMIN — PIPERACILLIN SODIUM AND TAZOBACTAM SODIUM 4.5 G: 4; .5 INJECTION, SOLUTION INTRAVENOUS at 17:22

## 2023-10-10 RX ADMIN — IOHEXOL 66 ML: 350 INJECTION, SOLUTION INTRAVENOUS at 13:00

## 2023-10-10 RX ADMIN — INSULIN LISPRO 2 UNITS: 100 INJECTION, SOLUTION INTRAVENOUS; SUBCUTANEOUS at 17:22

## 2023-10-10 RX ADMIN — OXYCODONE HYDROCHLORIDE AND ACETAMINOPHEN 1 TABLET: 5; 325 TABLET ORAL at 20:16

## 2023-10-10 RX ADMIN — METHYLPREDNISOLONE SODIUM SUCCINATE 60 MG: 125 INJECTION, POWDER, FOR SOLUTION INTRAMUSCULAR; INTRAVENOUS at 10:15

## 2023-10-10 RX ADMIN — AMLODIPINE BESYLATE 10 MG: 10 TABLET ORAL at 20:38

## 2023-10-10 RX ADMIN — SODIUM CHLORIDE 150 ML/HR: 4.5 INJECTION, SOLUTION INTRAVENOUS at 09:14

## 2023-10-10 ASSESSMENT — PAIN SCALES - GENERAL
PAINLEVEL_OUTOF10: 2
PAINLEVEL_OUTOF10: 0 - NO PAIN
PAINLEVEL_OUTOF10: 4
PAINLEVEL_OUTOF10: 8
PAINLEVEL_OUTOF10: 0 - NO PAIN
PAINLEVEL_OUTOF10: 2
PAINLEVEL_OUTOF10: 2

## 2023-10-10 ASSESSMENT — COGNITIVE AND FUNCTIONAL STATUS - GENERAL
CLIMB 3 TO 5 STEPS WITH RAILING: TOTAL
DRESSING REGULAR LOWER BODY CLOTHING: A LITTLE
TURNING FROM BACK TO SIDE WHILE IN FLAT BAD: A LITTLE
MOBILITY SCORE: 14
TOILETING: A LITTLE
MOVING TO AND FROM BED TO CHAIR: A LITTLE
DAILY ACTIVITIY SCORE: 20
STANDING UP FROM CHAIR USING ARMS: A LOT
DRESSING REGULAR UPPER BODY CLOTHING: A LITTLE
HELP NEEDED FOR BATHING: A LITTLE
WALKING IN HOSPITAL ROOM: TOTAL

## 2023-10-10 ASSESSMENT — ACTIVITIES OF DAILY LIVING (ADL): LACK_OF_TRANSPORTATION: NO

## 2023-10-10 ASSESSMENT — PAIN DESCRIPTION - DESCRIPTORS: DESCRIPTORS: ACHING

## 2023-10-10 ASSESSMENT — PAIN - FUNCTIONAL ASSESSMENT
PAIN_FUNCTIONAL_ASSESSMENT: 0-10

## 2023-10-10 NOTE — NURSING NOTE
Made two attempts to assess patient at different times.  Gale CHAMBERS reports anxiety and need for CT as priority, patient then later refused to be adjusted in bed for meals.  Will attempt tomorrow.

## 2023-10-10 NOTE — PROGRESS NOTES
Keisha Santa is a 62 y.o. female on day 3 of admission presenting with Septic shock (CMS/HCC).      Subjective   No acute events overnight.  On initial examination this morning Keisha states she was able to get sleep overnight and that her abdominal pain is improved 4/10 from 8/10 yesterday.  She states that her abdomen feels less swollen this morning.  She denied any additional fevers, chills, S.O.B., cough, chest pain, nausea, or vomiting at that time.    While rounding, she became agitated and acutely hypoxic, complaining that she could not breathe.  Her SpO2 was in the 80s, she was tachypnic, and tachycardic into the 140s.  On physical exam she was found to be wheezing.  Her oxygen was increased from 2L to 10L NC  with improvement in her SpO2 and agitation.  Stat CXR, ABG, were ordered and she was given 60mg methylprednisone IV and a DuoNeb breathing treatment with improvement in her respiratory status.  CXR was unremarkable for any acute findings, but ABG showed hypoxemia with pO2 75 so STAT CT/PE was ordered to assess for possible PE.  CT/PE was negative for any pulmonary embolism but did show worsening interstitial edema.    Objective     Last Recorded Vitals  BP (!) 146/91   Pulse 101   Temp 36.5 °C (97.7 °F)   Resp 24   Wt 114 kg (250 lb 10.6 oz)   SpO2 94%   Intake/Output last 3 Shifts:    Intake/Output Summary (Last 24 hours) at 10/10/2023 1039  Last data filed at 10/10/2023 0914  Gross per 24 hour   Intake 1870 ml   Output 2925 ml   Net -1055 ml       Admission Weight  Weight: 109 kg (241 lb 2.9 oz) (Simultaneous filing. User may not have seen previous data.) (10/07/23 0430)    Daily Weight  10/10/23 : 114 kg (250 lb 10.6 oz)    Image Results  XR chest 1 view    Result Date: 10/10/2023    Elevated right hemidiaphragm. Coarse lung markings.   MACRO: none   Signed by: Matilde Plummer 10/10/2023 10:45 AM Dictation workstation:   [id number]    Transthoracic Echo (TTE) Complete    Result Date:  10/9/2023   CONCLUSIONS:  1. Left ventricular systolic function is normal with a 60-65% estimated ejection fraction.  2. Aortic valve sclerosis.     Physical Exam  Constitutional:       Appearance: She is obese.      Comments: Resting comfortably in bed in no acute distress   HENT:      Head: Normocephalic and atraumatic.   Eyes:      Extraocular Movements: Extraocular movements intact.      Conjunctiva/sclera: Conjunctivae normal.      Pupils: Pupils are equal, round, and reactive to light.   Neck:      Comments: Right internal jugular Triple lumen catheter  Cardiovascular:      Comments: Mild tachycardia, regular rhythm. No murmurs or rubs  Pulmonary:      Effort: Pulmonary effort is normal. No respiratory distress.      Breath sounds: Normal breath sounds. No wheezing, rhonchi or rales.      Comments: Breathing comfortably on 2L NC  Abdominal:      Palpations: Abdomen is soft.      Tenderness: There is mild abdominal tenderness, improved from yesterday   Genitourinary:     Comments: Lord and rectal tube in place  Musculoskeletal:      Comments: Tenderness at left lower leg, present of open wound   Neurological:      General: No focal deficit present.      Mental Status: She is alert and oriented to person, place, and time.      Cranial Nerves: No cranial nerve deficit.     Relevant Results  Scheduled medications  aspirin, 81 mg, oral, Daily  atorvastatin, 40 mg, oral, Daily  enoxaparin, 40 mg, subcutaneous, q12h STEVE  influenza, 0.5 mL, intramuscular, During hospitalization  fluticasone furoate-vilanteroL, 1 puff, inhalation, Daily  folic acid, 1 mg, oral, Daily  gabapentin, 400 mg, oral, BID  insulin lispro, 0-10 Units, subcutaneous, TID with meals  methylPREDNISolone sodium succinate (PF), 60 mg, intravenous, q24h  montelukast, 10 mg, oral, Daily  multivitamin with minerals, 1 tablet, oral, Daily  nystatin, , Topical, BID  piperacillin-tazobactam, 4.5 g, intravenous, q6h      Continuous medications  sodium  chloride, 100 mL/hr, Last Rate: 100 mL/hr (10/10/23 1038)      PRN medications  PRN medications: acetaminophen, albuterol, albuterol, dextrose 10 % in water (D10W), dextrose, diclofenac sodium, EPINEPHrine, fluticasone, glucagon, ondansetron ODT **OR** ondansetron, oxyCODONE, oxyCODONE-acetaminophen, zinc oxide  Results for orders placed or performed during the hospital encounter of 10/07/23 (from the past 24 hour(s))   Urine electrolytes   Result Value Ref Range    Sodium, Urine Random 77 mmol/L    Sodium/Creatinine Ratio 225 Not established. mmol/g Creat    Potassium, Urine Random 13 mmol/L    Potassium/Creatinine Ratio 38 Not established mmol/g Creat    Chloride, Urine Random 97 mmol/L    Chloride/Creatinine Ratio 284 38 - 318 mmol/g creat    Creatinine, Urine Random 34.2 20.0 - 320.0 mg/dL   POCT GLUCOSE   Result Value Ref Range    POCT Glucose 98 74 - 99 mg/dL   POCT GLUCOSE   Result Value Ref Range    POCT Glucose 103 (H) 74 - 99 mg/dL   Renal Function Panel   Result Value Ref Range    Glucose 89 74 - 99 mg/dL    Sodium 137 136 - 145 mmol/L    Potassium 3.9 3.5 - 5.3 mmol/L    Chloride 105 98 - 107 mmol/L    Bicarbonate 26 21 - 32 mmol/L    Anion Gap 10 10 - 20 mmol/L    Urea Nitrogen 3 (L) 6 - 23 mg/dL    Creatinine 0.53 0.50 - 1.05 mg/dL    eGFR >90 >60 mL/min/1.73m*2    Calcium 8.1 (L) 8.6 - 10.3 mg/dL    Phosphorus 2.7 2.5 - 4.9 mg/dL    Albumin 2.7 (L) 3.4 - 5.0 g/dL   Magnesium   Result Value Ref Range    Magnesium 1.94 1.60 - 2.40 mg/dL   CBC and Auto Differential   Result Value Ref Range    WBC 9.0 4.4 - 11.3 x10*3/uL    nRBC 0.0 0.0 - 0.0 /100 WBCs    RBC 2.50 (L) 4.00 - 5.20 x10*6/uL    Hemoglobin 7.8 (L) 12.0 - 16.0 g/dL    Hematocrit 24.2 (L) 36.0 - 46.0 %    MCV 97 80 - 100 fL    MCH 31.2 26.0 - 34.0 pg    MCHC 32.2 32.0 - 36.0 g/dL    RDW 15.0 (H) 11.5 - 14.5 %    Platelets 319 150 - 450 x10*3/uL    MPV 9.2 7.5 - 11.5 fL    Neutrophils % 76.2 40.0 - 80.0 %    Immature Granulocytes %, Automated  0.8 0.0 - 0.9 %    Lymphocytes % 15.5 13.0 - 44.0 %    Monocytes % 6.2 2.0 - 10.0 %    Eosinophils % 0.4 0.0 - 6.0 %    Basophils % 0.9 0.0 - 2.0 %    Neutrophils Absolute 6.85 1.20 - 7.70 x10*3/uL    Immature Granulocytes Absolute, Automated 0.07 0.00 - 0.70 x10*3/uL    Lymphocytes Absolute 1.39 1.20 - 4.80 x10*3/uL    Monocytes Absolute 0.56 0.10 - 1.00 x10*3/uL    Eosinophils Absolute 0.04 0.00 - 0.70 x10*3/uL    Basophils Absolute 0.08 0.00 - 0.10 x10*3/uL   POCT GLUCOSE   Result Value Ref Range    POCT Glucose 86 74 - 99 mg/dL   POCT GLUCOSE   Result Value Ref Range    POCT Glucose 152 (H) 74 - 99 mg/dL   Blood Gas Arterial Unsolicited   Result Value Ref Range    POCT pH, Arterial 7.35 (L) 7.38 - 7.42 pH    POCT pCO2, Arterial 42 38 - 42 mm Hg    POCT pO2, Arterial 75 (L) 85 - 95 mm Hg    POCT SO2, Arterial 96 94 - 100 %    POCT Oxy Hemoglobin, Arterial 95.3 94.0 - 98.0 %    POCT Base Excess, Arterial -2.4 (L) -2.0 - 3.0 mmol/L    POCT HCO3 Calculated, Arterial 23.2 22.0 - 26.0 mmol/L    Iain's Test POS     Patient Temperature 37.0 degrees Celsius    FiO2 50 %           Assessment/Plan   Keisha Santa is a 62 y.o. female on day 2 of admission with PMH of HTN, HLD, T2DM, MUKESH non-compliant with CPAP, COPD, Parkinsonism, cervical cancer, 2 CVAs, HFpEF w/ EF 65%, chronic diarrhea, PVD, and chronic alcoholism who is admitted to the ICU for septic shock 2/2 colitis, cellulitis, and possible UTI.      This patient has a central line   Reason for the central line remaining today? Line unnecessary, will be removed today    Neurology:  #Acute metabolic encephalopathy likely 2/2 sepsis (improving)  - Patient remains A and O x3, improved from admission  - Continue to monitor mental status, and anticipate continued improvement with current medical management     #Chronic alcoholism   - CIWA protocol discontinued, as patient has not required ativan since 10/8  - Continuing folic acid and multivitamin      #Parkinsonism:  - Patient continues to show tremors, however the patient is not on any home regimen for Parkinson's disease. Will continue to monitor.     Cardiovascular:  #Shock  #Septic vs Hypovolemia 2/2 Colitis  - Most recent lactate on 10/8 1.4 and stable  - Levophed was discontinued @ 0800 on 10/8              -Patient's BP's have been stable off Levo in 100s/60s  - Discontinued maintenance fluids due to CT/PE showing worsening interstitial edema   -If patient's urine output significantly decreases, will give 20mg IV Lasix for diuresis  - Morning cortisol on 10/8 was 17 and wnl. Adrenal insufficiency less likely cause of polyuria.  - Fluid boluses PRN     #HTN/CHF   - Holding home HTN and CHF meds: Per chart review, patient recently filled a prescription for carvedilol 25 mg, amlodipine 10 mg, and spironolactone 50 mg. She used to take diltiazem, bumetanide, and lisinopril in March 2022  - Echo 10/9 showed: Normal LV systolic function 60-65% EF. LV diastolic filling indeterminate. Aortic valve sclerosis     #HLD/ CVA  - Continue ASA 81 mg and atorvastatin 40 mg daily.        Pulmonary:  #Acute hypoxic respiratory failure 2/2 sepsis and possible COPD exacerbation   - Patient had episode of significant hypoxia and agitation this AM on rounds with SpO2 in 80s   -Tachycardic 140s, tachypnic, and found to be wheezing on exam concerning for COPD exacerbation   -Increased O2 to 10L NC, and given 60mg Methylprednisone IV and DuoNebs   -CXR unremarkable for any acute process.   -ABG: pH 7.35, pO2 75, pCO2 42, HCO3 23.2   -CT/PE: No acute PE. Worseneing interstitial edema with ground glass opacities, and bilateral pleural effusions with right worse than left. Posterior basilar consolidation right worse than left.  - Continue to wean O2 to maintain SpO2 > 88%  - Continue home albuterol inhalers PRN, Breo Ellipta scheduled, montelukast 10 mg.   - CT Abd/Pelvis 10/8: New irregular consolidation of right lower lobe with  small areas of atelectasis and/or infiltrates. Trace pleural effusions              -Atelectasis and consolidation most likely due to poor function of right hemidiaphragm in addition to patient's body habitus and supine positioning  - Encourage bronchial hygiene and incentive spirometry use     Gastrointestinal:  #Chronic diarrhea:  #Colitis, rule out C. diff:  #Colitis with potential ileus  - C. Diff negative.   - Stool PCR, calprotectin, cyclospora pending  - Rectal tube in place  - CT Abd/Pelvis 10/8: Moderate generalized dilation of small bowel containing gas and fluid. Mild generalized dilation of of the colon also containing fluid and gas. Mild wall thickening of distal transverse colon. May reflect ileus and nonspecific infectious or inflammatory enterocolitis.  - Changed diet back to Regular diet with 2-3g Na restriction as patient's abdominal pain has improved. Will continue to monitor how patient tolerates meals.     #Elevated lipase, concern for acute pancreatitis  - Lipase level 155 on admission  - CT abdomen/pelvis did not show concerning signs of pancreatitis  - Fecal elastase pending     Renal/Urology:  #ITALO 2/2 sepsis and hypovolemia in the setting of chronic diarrhea and decreased PO intake (resolved)  #Polyuria most likely 2/2 resolving ITALO  - Cr/BUN 2.12/37 on admission  - ITALO has resolved with most recent Cr 0.55 and BUN 3  - Urine output improving, 2.9L last 24 hours. Down from 5.8L  - Urine electrolytes showed elevated Na 77  - Discontinued maintenance fluids due to CT showing worsening interstitial edema.   -If urine output decreases, will give 20mg IV Lasix for diuresis  - Discontinue Lord catheter     #Hyponatremia:  - Avoid rapid correction no more than 8 points per day  - Improving.. 118=>123=>124=>132=>134=>137 on 10/10     #Metabolic acidosis (resolved)   - On admission VBG 7.28/39/29/18  - Repeat VBG showed pH 7.31/pCO2 35/pO2 41/Bicarb 17.6/Lactate 1.4  - Continuing management      #Hyperkalemia (Resolved)     #Hypomagnesemia  - Magnesium improved s/p repletion 10/9. 1.94 from 1.5  - Continue to monitor with AM labs     #Hypophosphatemia  - Phosphorous improved s/p repletion 10/9. 2.7 from 1.9  - Continue to monitor with AM labs      Hematology/Oncology:  #Leukocytosis 2/2 sepsis  - Leukocytosis has improved. Most likely 2/2 dehydration and hemoconcentration. CTM.     Infectious Disease:  #Septic shock 2/2 colitis, cellulitis, possible UTI  - Discontinued ceftriaxone and metronidazole (10/7 - 10/7)  - Due to concern for C. Diff, patient was started on PO vancomycin  - PO vanc discontinued due to C. Diff being negative  - Starting IV vancomycin (10/7-10/9) and Zosyn (10/7 - )  - ID consulted: Recommend discontinuing vanc as skin changes more likely a result of venous stasis vs cellulitis. Continue Zosyn until all cultures come back  - IV Vancomycin discontinued 10/9   - Wound care consulted, appreciate recs  - Blood cultures negative 2 days growth on 10/10  - Urine culture negative     Endocrine:  #T2DM  - Mild SSI      Musculoskeletal/Skin:  - See ID section     Psychiatric:  - No active issues.     Fluids: PO PRN IV  Electrolytes: Replete as needed.  Nutrition: Adult diet 2-3g Na  GI prophylaxis: None  VTE prophylaxis: Lovenox  Abx: Ceftriaxone and metronidazole (10/7 - 10/7). PO vancomycin (10/7 - 10/7). IV vancomycin (10/7-10/9).  Zosyn (10/7 - )  Code Status: DNR, needs to be revised with the patient and family when she improves.  Lines/Tubes: Removing Central line 10/10, with plan to place mid line     Disposition: Patient admitted with septic shock likely secondary to colitis. On Zosyn. Patient no longer requiring Levophed, polyuria and abdominal pain improving. Patient stable for transfer to floors.    Rick Betancur MD

## 2023-10-10 NOTE — CARE PLAN
The patient's goals for the shift include      The clinical goals for the shift include Pain will be less than 5.    Over the shift, the patient did not make progress toward the following goal.

## 2023-10-10 NOTE — PROGRESS NOTES
Occupational Therapy                 Therapy Communication Note    Patient Name: Keisha Santa  MRN: [id number]  Today's Date: 10/10/2023     Discipline: Occupational Therapy    Missed Visit Reason: Missed Visit Reason: Patient placed on medical hold (Increased anxiety and confusion, medicated pending CAT scan per nurse. Requested hold therapies this date.PTA advised.)    Missed Time: Attempt

## 2023-10-10 NOTE — CARE PLAN
The patient's goals for the shift include      The clinical goals for the shift include Pain will be less than 5.    Over the shift, the patient will be safe .

## 2023-10-10 NOTE — CARE PLAN
The patient's goals for the shift include      The clinical goals for the shift include Pain will be less than 5.    Over the shift, the patient did not make progress toward the following goals. Barriers to progression include recurrent pain. Recommendations to address these barriers include increase mobility as tolerated.

## 2023-10-10 NOTE — CARE PLAN
The patient's goals for the shift include      The clinical goals for the shift include Pain will be less than 5.    Over the shift, the patient did not make progress toward the following goals. Barriers to progression include frequent pain.  Recommendations to address these barriers increase mobility as tolerated.

## 2023-10-10 NOTE — PROGRESS NOTES
"Keisha Santa is a 62 y.o. female on day 3 of admission presenting with Septic shock (CMS/HCC).    Subjective   Patient seen at bedside this morning.  Still has mild abdominal pain but improving.  Rectal tube in place and causing slight discomfort.    Objective     Physical Exam  Constitutional:       Appearance: Normal appearance. She is normal weight.   HENT:      Head: Normocephalic and atraumatic.      Mouth/Throat:      Mouth: Mucous membranes are moist.   Cardiovascular:      Rate and Rhythm: Normal rate and regular rhythm.      Heart sounds: Normal heart sounds. No murmur heard.     No gallop.   Pulmonary:      Breath sounds: Normal breath sounds.   Abdominal:      Palpations: Abdomen is soft.      Tenderness: There is abdominal tenderness.   Musculoskeletal:         General: Normal range of motion.   Skin:     General: Skin is warm.      Capillary Refill: Capillary refill takes less than 2 seconds.   Neurological:      General: No focal deficit present.      Mental Status: She is alert and oriented to person, place, and time. Mental status is at baseline.   Psychiatric:         Mood and Affect: Mood normal.         Behavior: Behavior normal.         Last Recorded Vitals  Blood pressure (!) 146/91, pulse 101, temperature 36.5 °C (97.7 °F), resp. rate 24, height 1.6 m (5' 2.99\"), weight 114 kg (251 lb 5.2 oz), SpO2 94 %.  Intake/Output last 3 Shifts:  I/O last 3 completed shifts:  In: 3590.7 (31.6 mL/kg) [P.O.:240; I.V.:1950.7 (17.2 mL/kg); IV Piggyback:1400]  Out: 5375 (47.3 mL/kg) [Urine:5275 (1.3 mL/kg/hr); Stool:100]  Weight: 113.7 kg     Relevant Results  Scheduled medications  aspirin, 81 mg, oral, Daily  atorvastatin, 40 mg, oral, Daily  enoxaparin, 40 mg, subcutaneous, q12h STEVE  influenza, 0.5 mL, intramuscular, During hospitalization  fluticasone furoate-vilanteroL, 1 puff, inhalation, Daily  folic acid, 1 mg, oral, Daily  gabapentin, 400 mg, oral, BID  insulin lispro, 0-10 Units, subcutaneous, " TID with meals  methylPREDNISolone sod succinate (PF), , ,   methylPREDNISolone sodium succinate (PF), 60 mg, intravenous, q24h  montelukast, 10 mg, oral, Daily  multivitamin with minerals, 1 tablet, oral, Daily  nystatin, , Topical, BID  piperacillin-tazobactam, 4.5 g, intravenous, q6h      Continuous medications  sodium chloride, 100 mL/hr, Last Rate: 100 mL/hr (10/10/23 1115)      PRN medications  PRN medications: acetaminophen, albuterol, albuterol, dextrose 10 % in water (D10W), dextrose, diclofenac sodium, EPINEPHrine, fluticasone, glucagon, methylPREDNISolone sod succinate (PF), ondansetron ODT **OR** ondansetron, oxyCODONE, oxyCODONE-acetaminophen, oxygen, zinc oxide    Results for orders placed or performed during the hospital encounter of 10/07/23 (from the past 24 hour(s))   POCT GLUCOSE   Result Value Ref Range    POCT Glucose 103 (H) 74 - 99 mg/dL   Renal Function Panel   Result Value Ref Range    Glucose 89 74 - 99 mg/dL    Sodium 137 136 - 145 mmol/L    Potassium 3.9 3.5 - 5.3 mmol/L    Chloride 105 98 - 107 mmol/L    Bicarbonate 26 21 - 32 mmol/L    Anion Gap 10 10 - 20 mmol/L    Urea Nitrogen 3 (L) 6 - 23 mg/dL    Creatinine 0.53 0.50 - 1.05 mg/dL    eGFR >90 >60 mL/min/1.73m*2    Calcium 8.1 (L) 8.6 - 10.3 mg/dL    Phosphorus 2.7 2.5 - 4.9 mg/dL    Albumin 2.7 (L) 3.4 - 5.0 g/dL   Magnesium   Result Value Ref Range    Magnesium 1.94 1.60 - 2.40 mg/dL   CBC and Auto Differential   Result Value Ref Range    WBC 9.0 4.4 - 11.3 x10*3/uL    nRBC 0.0 0.0 - 0.0 /100 WBCs    RBC 2.50 (L) 4.00 - 5.20 x10*6/uL    Hemoglobin 7.8 (L) 12.0 - 16.0 g/dL    Hematocrit 24.2 (L) 36.0 - 46.0 %    MCV 97 80 - 100 fL    MCH 31.2 26.0 - 34.0 pg    MCHC 32.2 32.0 - 36.0 g/dL    RDW 15.0 (H) 11.5 - 14.5 %    Platelets 319 150 - 450 x10*3/uL    MPV 9.2 7.5 - 11.5 fL    Neutrophils % 76.2 40.0 - 80.0 %    Immature Granulocytes %, Automated 0.8 0.0 - 0.9 %    Lymphocytes % 15.5 13.0 - 44.0 %    Monocytes % 6.2 2.0 - 10.0 %     Eosinophils % 0.4 0.0 - 6.0 %    Basophils % 0.9 0.0 - 2.0 %    Neutrophils Absolute 6.85 1.20 - 7.70 x10*3/uL    Immature Granulocytes Absolute, Automated 0.07 0.00 - 0.70 x10*3/uL    Lymphocytes Absolute 1.39 1.20 - 4.80 x10*3/uL    Monocytes Absolute 0.56 0.10 - 1.00 x10*3/uL    Eosinophils Absolute 0.04 0.00 - 0.70 x10*3/uL    Basophils Absolute 0.08 0.00 - 0.10 x10*3/uL   POCT GLUCOSE   Result Value Ref Range    POCT Glucose 86 74 - 99 mg/dL   POCT GLUCOSE   Result Value Ref Range    POCT Glucose 152 (H) 74 - 99 mg/dL   Blood Gas Arterial Unsolicited   Result Value Ref Range    POCT pH, Arterial 7.35 (L) 7.38 - 7.42 pH    POCT pCO2, Arterial 42 38 - 42 mm Hg    POCT pO2, Arterial 75 (L) 85 - 95 mm Hg    POCT SO2, Arterial 96 94 - 100 %    POCT Oxy Hemoglobin, Arterial 95.3 94.0 - 98.0 %    POCT Base Excess, Arterial -2.4 (L) -2.0 - 3.0 mmol/L    POCT HCO3 Calculated, Arterial 23.2 22.0 - 26.0 mmol/L    Iain's Test POS     Patient Temperature 37.0 degrees Celsius    FiO2 50 %   POCT GLUCOSE   Result Value Ref Range    POCT Glucose 139 (H) 74 - 99 mg/dL        This patient currently has cardiac telemetry ordered; if you would like to modify or discontinue the telemetry order, click here to go to the orders activity to modify/discontinue the order.  This patient has a central line   Reason for the central line remaining today? Hemodynamic monitoring    Assessment/Plan   Principal Problem:    Septic shock (CMS/[facility])  Active Problems:    CHF (congestive heart failure) (CMS/[facility])    Sepsis, pending blood cultures  - continue zosyn until cultures come back     Abdominal pain / Chronic diarrhea likely due to alcohol usage  - C. Diff negative  - Giardia, cryptosporidium, stool pathogen panel, calprotectin pending  - on CIWA      Respiratory failure / COPD/ atelectasis  - monitor     Leg stasis  - stopping vancomycin     Kj Lopez DO  PGY-I Internal Medicine          Attending note : the patient was evaluated, the  note was reviewed and up dated  Sepsis, the BC is negative  Abdominal pain / diarrhea, C. Diff is negative  Respiratory failure / COPD / atelectasis  Legs stasis  Encephalopathy   Recommendations :  Continue Zosyn , anticipate to stop the antibiotics if the BC remain negative

## 2023-10-10 NOTE — CARE PLAN
Rec'd referral re: pt's alcohol use.  Met with pt who does not view her alcohol use as an addiction, and drinks because she chooses to.  She does not want any resources for cessation.  Pt visiting from [place] where her [place].  Out of state [employer] do not cover stay at [facility].  Pt plans on return to [place] 10/22.  I gave pt  Financial Assistance application for this hospital stay.

## 2023-10-10 NOTE — PROGRESS NOTES
"Physical Therapy                 Therapy Communication Note    Patient Name: Keisha Santa  MRN: [id number]  Today's Date: 10/10/2023     Discipline: Physical Therapy    Missed Visit Reason: Missed Visit Reason: Patient in a medical procedure (Per DOYLE Crocker \"Patietns nurse wants therapy to hold today, patient ius lethargic, going for a CT Scan and not appropriate\" no tx.)    Missed Time: Attempt    Comment:  "

## 2023-10-10 NOTE — CARE PLAN
The patient's goals for the shift include      The clinical goals for the shift include have pain controlled <5 through the night    Over the shift, the patient did not make progress toward the following goals. Barriers to progression include pain and fatigue. Recommendations to address these barriers include premedicate with pain meds prior to working withpt/ot.

## 2023-10-10 NOTE — CARE PLAN
The patient's goals for the shift include  manage pain level.    The clinical goals for the shift include have pain controlled <5 through the night    Over the shift, the patient did not make progress toward the following goals. Barriers to progression include fatigue/pain. Recommendations to address these barriers include reinforce education on pain modalities and prn schedule.

## 2023-10-10 NOTE — CARE PLAN
The patient's goals for the shift include      The clinical goals for the shift include Pain will be less than 5.    Over the shift, the patient did not make progress toward the following goals. Barriers to progression include pain. Recommendations to address these barriers include .

## 2023-10-10 NOTE — CARE PLAN
The patient's goals for the shift include      The clinical goals for the shift include have pain controlled <5 through the night    Over the shift, the patient did not make progress toward the following goals. Barriers to progression include anxiety. Recommendations to address these barriers include plan care carefully explaining all things before being done.

## 2023-10-10 NOTE — PROGRESS NOTES
10/10/23 1152   Discharge Planning   Living Arrangements Spouse/significant other   Support Systems Spouse/significant other;Family members;Children   Assistance Needed Independent with ADLs ar baseline, ambulates with a walker and drives. Patient lives in [place] and is here visiting her adult children   Type of Residence Private residence   Who is requesting discharge planning? Provider   Home or Post Acute Services None  (Refusing SNF and HHC- Patient has [facility] Medicaid.)   Type of Post Acute Facility Services   (Refusing)   Patient expects to be discharged to: Home with spouse   Does the patient need discharge transport arranged? No   Financial Resource Strain   How hard is it for you to pay for the very basics like food, housing, medical care, and heating? Not hard   Housing Stability   In the last 12 months, was there a time when you were not able to pay the mortgage or rent on time? N   Transportation Needs   In the past 12 months, has lack of transportation kept you from medical appointments or from getting medications? no   In the past 12 months, has lack of transportation kept you from meetings, work, or from getting things needed for daily living? No   Patient Choice   Provider Choice list and CMS website (https://medicare.gov/care-compare#search) for post-acute Quality and Resource Measure Data were provided and reviewed with: Other (Comment)  (Refused)       10/10/2023 1155 Patient has [place] Medicaid and is recommended for moderate intensity therapy therapy at time of DC. Patient refusing SNF or HHC. Patient is in [place] visiting children and plans to return to her home in [place] with her spouse.

## 2023-10-11 LAB
ALBUMIN SERPL BCP-MCNC: 3 G/DL (ref 3.4–5)
ANION GAP SERPL CALC-SCNC: 11 MMOL/L (ref 10–20)
BACTERIA BLD CULT: NORMAL
BACTERIA BLD CULT: NORMAL
BUN SERPL-MCNC: 5 MG/DL (ref 6–23)
C COLI+JEJ+UPSA DNA STL QL NAA+PROBE: NOT DETECTED
CALCIUM SERPL-MCNC: 8.6 MG/DL (ref 8.6–10.3)
CHLORIDE SERPL-SCNC: 105 MMOL/L (ref 98–107)
CO2 SERPL-SCNC: 28 MMOL/L (ref 21–32)
CREAT SERPL-MCNC: 0.61 MG/DL (ref 0.5–1.05)
EC STX1 GENE STL QL NAA+PROBE: NOT DETECTED
EC STX2 GENE STL QL NAA+PROBE: NOT DETECTED
ERYTHROCYTE [DISTWIDTH] IN BLOOD BY AUTOMATED COUNT: 14.9 % (ref 11.5–14.5)
GFR SERPL CREATININE-BSD FRML MDRD: >90 ML/MIN/1.73M*2
GLUCOSE BLD MANUAL STRIP-MCNC: 104 MG/DL (ref 74–99)
GLUCOSE BLD MANUAL STRIP-MCNC: 111 MG/DL (ref 74–99)
GLUCOSE BLD MANUAL STRIP-MCNC: 210 MG/DL (ref 74–99)
GLUCOSE BLD MANUAL STRIP-MCNC: 211 MG/DL (ref 74–99)
GLUCOSE SERPL-MCNC: 116 MG/DL (ref 74–99)
HCT VFR BLD AUTO: 25.3 % (ref 36–46)
HGB BLD-MCNC: 8.2 G/DL (ref 12–16)
MAGNESIUM SERPL-MCNC: 1.94 MG/DL (ref 1.6–2.4)
MCH RBC QN AUTO: 30.8 PG (ref 26–34)
MCHC RBC AUTO-ENTMCNC: 32.4 G/DL (ref 32–36)
MCV RBC AUTO: 95 FL (ref 80–100)
NOROVIRUS GI + GII RNA STL NAA+PROBE: NOT DETECTED
NRBC BLD-RTO: 0 /100 WBCS (ref 0–0)
PHOSPHATE SERPL-MCNC: 3.9 MG/DL (ref 2.5–4.9)
PLATELET # BLD AUTO: 352 X10*3/UL (ref 150–450)
PMV BLD AUTO: 8.7 FL (ref 7.5–11.5)
POTASSIUM SERPL-SCNC: 4.5 MMOL/L (ref 3.5–5.3)
RBC # BLD AUTO: 2.66 X10*6/UL (ref 4–5.2)
RV RNA STL NAA+PROBE: NOT DETECTED
SALMONELLA DNA STL QL NAA+PROBE: NOT DETECTED
SHIGELLA DNA SPEC QL NAA+PROBE: NOT DETECTED
SODIUM SERPL-SCNC: 139 MMOL/L (ref 136–145)
V CHOLERAE DNA STL QL NAA+PROBE: NOT DETECTED
WBC # BLD AUTO: 7.8 X10*3/UL (ref 4.4–11.3)
Y ENTEROCOL DNA STL QL NAA+PROBE: NOT DETECTED

## 2023-10-11 PROCEDURE — 94640 AIRWAY INHALATION TREATMENT: CPT

## 2023-10-11 PROCEDURE — 2500000001 HC RX 250 WO HCPCS SELF ADMINISTERED DRUGS (ALT 637 FOR MEDICARE OP)

## 2023-10-11 PROCEDURE — 2500000002 HC RX 250 W HCPCS SELF ADMINISTERED DRUGS (ALT 637 FOR MEDICARE OP, ALT 636 FOR OP/ED): Performed by: INTERNAL MEDICINE

## 2023-10-11 PROCEDURE — 37799 UNLISTED PX VASCULAR SURGERY: CPT

## 2023-10-11 PROCEDURE — 2500000001 HC RX 250 WO HCPCS SELF ADMINISTERED DRUGS (ALT 637 FOR MEDICARE OP): Performed by: INTERNAL MEDICINE

## 2023-10-11 PROCEDURE — 2500000002 HC RX 250 W HCPCS SELF ADMINISTERED DRUGS (ALT 637 FOR MEDICARE OP, ALT 636 FOR OP/ED)

## 2023-10-11 PROCEDURE — 2500000004 HC RX 250 GENERAL PHARMACY W/ HCPCS (ALT 636 FOR OP/ED)

## 2023-10-11 PROCEDURE — 80069 RENAL FUNCTION PANEL: CPT

## 2023-10-11 PROCEDURE — 83735 ASSAY OF MAGNESIUM: CPT

## 2023-10-11 PROCEDURE — 97530 THERAPEUTIC ACTIVITIES: CPT | Mod: GO,CO,59

## 2023-10-11 PROCEDURE — 85027 COMPLETE CBC AUTOMATED: CPT

## 2023-10-11 PROCEDURE — 82947 ASSAY GLUCOSE BLOOD QUANT: CPT

## 2023-10-11 PROCEDURE — 96372 THER/PROPH/DIAG INJ SC/IM: CPT

## 2023-10-11 PROCEDURE — 97535 SELF CARE MNGMENT TRAINING: CPT | Mod: GO,CO

## 2023-10-11 PROCEDURE — 94760 N-INVAS EAR/PLS OXIMETRY 1: CPT

## 2023-10-11 PROCEDURE — 99233 SBSQ HOSP IP/OBS HIGH 50: CPT

## 2023-10-11 PROCEDURE — 1200000002 HC GENERAL ROOM WITH TELEMETRY DAILY

## 2023-10-11 RX ORDER — ALBUTEROL SULFATE 0.83 MG/ML
2.5 SOLUTION RESPIRATORY (INHALATION)
Status: DISCONTINUED | OUTPATIENT
Start: 2023-10-11 | End: 2023-10-12

## 2023-10-11 RX ORDER — ALBUTEROL SULFATE 0.83 MG/ML
2.5 SOLUTION RESPIRATORY (INHALATION) EVERY 2 HOUR PRN
Status: DISCONTINUED | OUTPATIENT
Start: 2023-10-11 | End: 2023-10-16 | Stop reason: HOSPADM

## 2023-10-11 RX ORDER — BUMETANIDE 1 MG/1
2 TABLET ORAL DAILY
Status: DISCONTINUED | OUTPATIENT
Start: 2023-10-11 | End: 2023-10-16 | Stop reason: HOSPADM

## 2023-10-11 RX ADMIN — ASPIRIN 81 MG: 81 TABLET, COATED ORAL at 10:07

## 2023-10-11 RX ADMIN — OXYCODONE HYDROCHLORIDE AND ACETAMINOPHEN 1 TABLET: 5; 325 TABLET ORAL at 22:06

## 2023-10-11 RX ADMIN — PIPERACILLIN SODIUM AND TAZOBACTAM SODIUM 4.5 G: 4; .5 INJECTION, SOLUTION INTRAVENOUS at 10:20

## 2023-10-11 RX ADMIN — ATORVASTATIN CALCIUM 40 MG: 40 TABLET, FILM COATED ORAL at 22:06

## 2023-10-11 RX ADMIN — METHYLPREDNISOLONE SODIUM SUCCINATE 60 MG: 125 INJECTION, POWDER, FOR SOLUTION INTRAMUSCULAR; INTRAVENOUS at 10:05

## 2023-10-11 RX ADMIN — ENOXAPARIN SODIUM 40 MG: 40 INJECTION SUBCUTANEOUS at 10:06

## 2023-10-11 RX ADMIN — MULTIPLE VITAMINS W/ MINERALS TAB 1 TABLET: TAB at 10:09

## 2023-10-11 RX ADMIN — GABAPENTIN 400 MG: 400 CAPSULE ORAL at 10:09

## 2023-10-11 RX ADMIN — INSULIN LISPRO 4 UNITS: 100 INJECTION, SOLUTION INTRAVENOUS; SUBCUTANEOUS at 17:20

## 2023-10-11 RX ADMIN — BUMETANIDE 2 MG: 1 TABLET ORAL at 12:15

## 2023-10-11 RX ADMIN — ALBUTEROL SULFATE 2.5 MG: 2.5 SOLUTION RESPIRATORY (INHALATION) at 19:54

## 2023-10-11 RX ADMIN — CARVEDILOL 25 MG: 12.5 TABLET, FILM COATED ORAL at 10:08

## 2023-10-11 RX ADMIN — FLUTICASONE FUROATE AND VILANTEROL TRIFENATATE 1 PUFF: 200; 25 POWDER RESPIRATORY (INHALATION) at 10:22

## 2023-10-11 RX ADMIN — GABAPENTIN 400 MG: 400 CAPSULE ORAL at 22:06

## 2023-10-11 RX ADMIN — PIPERACILLIN SODIUM AND TAZOBACTAM SODIUM 4.5 G: 4; .5 INJECTION, SOLUTION INTRAVENOUS at 04:12

## 2023-10-11 RX ADMIN — CARVEDILOL 25 MG: 12.5 TABLET, FILM COATED ORAL at 17:42

## 2023-10-11 RX ADMIN — ENOXAPARIN SODIUM 40 MG: 40 INJECTION SUBCUTANEOUS at 22:05

## 2023-10-11 RX ADMIN — PIPERACILLIN SODIUM AND TAZOBACTAM SODIUM 4.5 G: 4; .5 INJECTION, SOLUTION INTRAVENOUS at 23:05

## 2023-10-11 RX ADMIN — ALBUTEROL SULFATE 2.5 MG: 2.5 SOLUTION RESPIRATORY (INHALATION) at 14:44

## 2023-10-11 RX ADMIN — ALBUTEROL SULFATE 2.5 MG: 2.5 SOLUTION RESPIRATORY (INHALATION) at 09:00

## 2023-10-11 RX ADMIN — TRAZODONE HYDROCHLORIDE 150 MG: 50 TABLET ORAL at 22:05

## 2023-10-11 RX ADMIN — SPIRONOLACTONE 50 MG: 25 TABLET ORAL at 10:09

## 2023-10-11 RX ADMIN — AMLODIPINE BESYLATE 10 MG: 10 TABLET ORAL at 10:08

## 2023-10-11 RX ADMIN — PIPERACILLIN SODIUM AND TAZOBACTAM SODIUM 4.5 G: 4; .5 INJECTION, SOLUTION INTRAVENOUS at 17:42

## 2023-10-11 RX ADMIN — FOLIC ACID 1 MG: 1 TABLET ORAL at 10:09

## 2023-10-11 RX ADMIN — MONTELUKAST 10 MG: 10 TABLET, FILM COATED ORAL at 10:09

## 2023-10-11 RX ADMIN — NYSTATIN: 100000 CREAM TOPICAL at 10:05

## 2023-10-11 RX ADMIN — NYSTATIN: 100000 CREAM TOPICAL at 22:05

## 2023-10-11 ASSESSMENT — COGNITIVE AND FUNCTIONAL STATUS - GENERAL
MOBILITY SCORE: 14
MOBILITY SCORE: 14
CLIMB 3 TO 5 STEPS WITH RAILING: TOTAL
MOVING TO AND FROM BED TO CHAIR: A LITTLE
TOILETING: A LOT
STANDING UP FROM CHAIR USING ARMS: A LOT
DAILY ACTIVITIY SCORE: 17
HELP NEEDED FOR BATHING: A LITTLE
HELP NEEDED FOR BATHING: A LITTLE
TURNING FROM BACK TO SIDE WHILE IN FLAT BAD: A LITTLE
WALKING IN HOSPITAL ROOM: TOTAL
DRESSING REGULAR LOWER BODY CLOTHING: A LOT
CLIMB 3 TO 5 STEPS WITH RAILING: TOTAL
TURNING FROM BACK TO SIDE WHILE IN FLAT BAD: A LITTLE
TOILETING: A LOT
PERSONAL GROOMING: A LITTLE
DRESSING REGULAR LOWER BODY CLOTHING: A LOT
DAILY ACTIVITIY SCORE: 17
DRESSING REGULAR UPPER BODY CLOTHING: A LITTLE
MOVING TO AND FROM BED TO CHAIR: A LITTLE
STANDING UP FROM CHAIR USING ARMS: A LOT
DAILY ACTIVITIY SCORE: 17
WALKING IN HOSPITAL ROOM: TOTAL
PERSONAL GROOMING: A LITTLE
DRESSING REGULAR LOWER BODY CLOTHING: A LOT
PERSONAL GROOMING: A LITTLE
DRESSING REGULAR UPPER BODY CLOTHING: A LITTLE
HELP NEEDED FOR BATHING: A LITTLE
TOILETING: A LOT
DRESSING REGULAR UPPER BODY CLOTHING: A LITTLE

## 2023-10-11 ASSESSMENT — PAIN SCALES - GENERAL
PAINLEVEL_OUTOF10: 1
PAINLEVEL_OUTOF10: 0 - NO PAIN
PAINLEVEL_OUTOF10: 6
PAINLEVEL_OUTOF10: 5 - MODERATE PAIN

## 2023-10-11 ASSESSMENT — PAIN - FUNCTIONAL ASSESSMENT
PAIN_FUNCTIONAL_ASSESSMENT: 0-10

## 2023-10-11 ASSESSMENT — ACTIVITIES OF DAILY LIVING (ADL)
HOME_MANAGEMENT_TIME_ENTRY: 25
LACK_OF_TRANSPORTATION: NO

## 2023-10-11 ASSESSMENT — PAIN SCALES - WONG BAKER
WONGBAKER_NUMERICALRESPONSE: NO HURT
WONGBAKER_NUMERICALRESPONSE: NO HURT

## 2023-10-11 NOTE — PROGRESS NOTES
Occupational Therapy    Occupational Therapy Treatment    Name: Keisha Santa  MRN: [id number]  : 1960  Date: 10/11/23  Time Calculation  Start Time: 0800  Stop Time: 0838  Time Calculation (min): 38 min    Assessment:     Plan:  Treatment Interventions: ADL retraining, Functional transfer training  OT Frequency: 3 times per week  OT Discharge Recommendations: Moderate intensity level of continued care    Subjective   General:  OT Last Visit  OT Received On: 10/11/23  General  Reason for Referral: Impaired Mobility and ADL  Referred By: Mine Jones DO  Past Medical History Relevant to Rehab: Weakness, Diarreha    Prior to Session Communication: Bedside nurse  Patient Position Received: Bed, 3 rail up, Alarm on  Preferred Learning Style: verbal, auditory    General Comment: ICU telemetry, 2 liters O2 via nasal cannula with O2 sats maintained 86-94% in course of session. Fecal management system discontinued in course of session by RN. Pt demonstrated good tolerance of activity and exercise with significantly improved affect and outlook within course of treatment.    Pain Assessment:  Pain Assessment  Pain Score: 5 - Moderate pain  Pain Type: Acute pain  Pain Location: Rectum  Clinical Progression: Rapidly improving  Patient's Stated Pain Goal:  (Pt pain report significantly improved following removal of rectal tube by nurse in course of treatment.)     Objective   Activities of Daily Living: LE Dressing  LE Dressing: Yes  LE Dressing Adaptive Equipment: Sock aide, Reacher  Sock Level of Assistance: Minimum assistance  LE Dressing Where Assessed: Edge of bed  LE Dressing Comments:  (Pt educated on use of adaptive tools d/t difficulty reaching feet, demonstrated good grasp and ability to use tools.)    Functional Standing Tolerance:  Functional Standing Tolerance  Time: 10  Activity: sitting EOB  Bed Mobility/Transfers: Bed Mobility  Bed Mobility: Yes  Bed Mobility 1  Bed Mobility 1: Scooting, Rolling  right, Rolling left  Level of Assistance 1: Minimum assistance  Bed Mobility Comments 1: Slightly increased time required.  Bed Mobility 2  Bed Mobility  2: Supine to sitting  Level of Assistance 2: Minimum assistance  Bed Mobility Comments 2: encouragement and motor planning instruction required for task completion.    Transfers  Transfer: Yes  Transfer 1  Transfer From 1: Sit to  Transfer to 1: Stand  Technique 1: Sit to stand  Transfer Device 1:  (Hand in hand)  Transfer Level of Assistance 1: Contact guard  Transfers 2  Transfer From 2: Bed to  Transfer to 2: Chair with arms  Technique 2: Stand pivot  Transfer Device 2:  (Hand in hand)  Transfer Level of Assistance 2: Contact guard    Outcome Measures:  [facility] Daily Activity  Putting on and taking off regular lower body clothing: A lot  Bathing (including washing, rinsing, drying): A little  Putting on and taking off regular upper body clothing: A little  Toileting, which includes using toilet, bedpan or urinal: A lot  Taking care of personal grooming such as brushing teeth: A little  Eating Meals: None  Daily Activity - Total Score: 17    Education Documentation  Body Mechanics, taught by MARIAN Rivero at 10/11/2023  9:08 AM.  Learner: Patient  Readiness: Acceptance  Method: Explanation, Demonstration  Response: Verbalizes Understanding, Demonstrated Understanding, Needs Reinforcement  Comment: Pt instructed in neurorehabilitative concepts and progression for impaired ADL and mobility.    ADL Training, taught by MARIAN Rivero at 10/11/2023  9:08 AM.  Learner: Patient  Readiness: Acceptance  Method: Explanation, Demonstration  Response: Verbalizes Understanding, Demonstrated Understanding, Needs Reinforcement  Comment: Pt instructed in neurorehabilitative concepts and progression for impaired ADL and mobility.    Education Comments  No comments found.      Goals:  Encounter Problems       Encounter Problems (Active)       ADLs       Patient with complete  lower body dressing with minimal assist  level of assistance donning and doffing all LE clothes  with reacher, shoe horn, and sock-aid while supported sitting (Progressing)       Start:  10/08/23    Expected End:  10/22/23            Patient will complete daily grooming tasks brushing teeth and washing face/hair with minimal assist  level of assistance and PRN adaptive equipment while supported sitting. (Progressing)       Start:  10/08/23    Expected End:  10/22/23            Patient will complete toileting including hygiene clothing management/hygiene with minimal assist  level of assistance and raised toilet seat, grab bars, and shower chair. (Progressing)       Start:  10/08/23    Expected End:  10/22/23               COGNITION/SAFETY       Patient will follow 75% Two commands to allow improved ADL performance. (Progressing)       Start:  10/08/23    Expected End:  10/22/23               Mobility       STG - Patient will ambulate at least 25' with FWW CGA (Progressing)       Start:  10/08/23    Expected End:  10/22/23               TRANSFERS       Patient will perform bed mobility minimal assist  level of assistance and bed rails in order to improve safety and independence with mobility (Progressing)       Start:  10/08/23    Expected End:  10/22/23

## 2023-10-11 NOTE — PROGRESS NOTES
10/11/23 1242   Discharge Planning   Living Arrangements Spouse/significant other   Support Systems Spouse/significant other   Assistance Needed Independent with ADLs ar baseline, ambulates with a walker and drives. Patient lives in [place] and is here visiting her adult children   Type of Residence Private residence   Home or Post Acute Services Other (Comment);None  (patient refusing SNF and HHC)   Patient expects to be discharged to: to childrens home   Does the patient need discharge transport arranged? No   Financial Resource Strain   How hard is it for you to pay for the very basics like food, housing, medical care, and heating? Not hard   Housing Stability   In the last 12 months, was there a time when you were not able to pay the mortgage or rent on time? N   In the last 12 months, how many places have you lived? 1   In the last 12 months, was there a time when you did not have a steady place to sleep or slept in a shelter (including now)? N   Transportation Needs   In the past 12 months, has lack of transportation kept you from medical appointments or from getting medications? no   In the past 12 months, has lack of transportation kept you from meetings, work, or from getting things needed for daily living? No

## 2023-10-11 NOTE — CARE PLAN
The patient's goals for the shift include      The clinical goals for the shift include Pt. will have pain less than 5 thru shift.    Over the shift, the patient did not make progress toward the following goals. Barriers to progression include decreased mobility due to chronic issues. Recommendations to address these barriers include encourage frequent position changes and mobility to aid in progression of mobility.

## 2023-10-11 NOTE — PROGRESS NOTES
Keisha Santa is a 62 y.o. female on day 4 of admission presenting with Septic shock (CMS/HCC).      Subjective   Ms. Santa was seen resting comfortably in bed this morning with no acute events overnight.  She states that her abdominal pain continues to improve and is a 2/10 this morning.  She tolerated her dinner well last night without any worsening abdominal pain or nausea.  Her only complaint at this time is a continued cough and mild wheezing.    Objective     Last Recorded Vitals  /73   Pulse 84   Temp 36.4 °C (97.5 °F)   Resp 19   Wt 115 kg (252 lb 10.4 oz)   SpO2 91%   Intake/Output last 3 Shifts:    Intake/Output Summary (Last 24 hours) at 10/11/2023 1017  Last data filed at 10/11/2023 0800  Gross per 24 hour   Intake 1507.67 ml   Output 1900 ml   Net -392.33 ml       Admission Weight  Weight: 109 kg (241 lb 2.9 oz) (Simultaneous filing. User may not have seen previous data.) (10/07/23 0430)    Daily Weight  10/11/23 : 115 kg (252 lb 10.4 oz)    Image Results      Physical Exam  Constitutional:       Appearance: She is obese.      Comments: Resting comfortably in bed in no acute distress   HENT:      Head: Normocephalic and atraumatic.   Eyes:      Extraocular Movements: Extraocular movements intact.      Conjunctiva/sclera: Conjunctivae normal.      Pupils: Pupils are equal, round, and reactive to light.   Neck:      Comments: Right internal jugular Triple lumen catheter  Cardiovascular:      Comments: Mild tachycardia, regular rhythm. No murmurs or rubs  Pulmonary:      Effort: Pulmonary effort is normal. No respiratory distress.      Breath sounds: Normal breath sounds. Wheezing in bilateral lung fields     Comments: Breathing comfortably on 3L HFNC  Abdominal:      Abdomen is soft, non-tender, non-distended  Genitourinary:     Comments: Lord and rectal tube in place  Musculoskeletal:      Comments: Tenderness at left lower leg, present of open wound   Neurological:      General: No  focal deficit present.      Mental Status: She is alert and oriented to person, place, and time.      Cranial Nerves: No cranial nerve deficit.     Relevant Results  Scheduled medications  albuterol, 2.5 mg, nebulization, TID  amLODIPine, 10 mg, oral, Daily  aspirin, 81 mg, oral, Daily  atorvastatin, 40 mg, oral, Daily  bumetanide, 2 mg, oral, Daily  carvedilol, 25 mg, oral, BID with meals  enoxaparin, 40 mg, subcutaneous, q12h STEVE  influenza, 0.5 mL, intramuscular, During hospitalization  fluticasone furoate-vilanteroL, 1 puff, inhalation, Daily  folic acid, 1 mg, oral, Daily  gabapentin, 400 mg, oral, BID  insulin lispro, 0-10 Units, subcutaneous, TID with meals  lidocaine, 5 mL, infiltration, Once  methylPREDNISolone sodium succinate (PF), 60 mg, intravenous, q24h  montelukast, 10 mg, oral, Daily  multivitamin with minerals, 1 tablet, oral, Daily  nystatin, , Topical, BID  piperacillin-tazobactam, 4.5 g, intravenous, q6h  spironolactone, 50 mg, oral, Daily  traZODone, 150 mg, oral, Nightly      Continuous medications     PRN medications  PRN medications: acetaminophen, albuterol, albuterol, dextrose 10 % in water (D10W), dextrose, diclofenac sodium, EPINEPHrine, fluticasone, glucagon, ondansetron ODT **OR** ondansetron, oxyCODONE, oxyCODONE-acetaminophen, oxygen, zinc oxide      Assessment/Plan   Keisha Santa is a 62 y.o. female on day 2 of admission with PMH of HTN, HLD, T2DM, MUKESH non-compliant with CPAP, COPD, Parkinsonism, cervical cancer, 2 CVAs, HFpEF w/ EF 65%, chronic diarrhea, PVD, and chronic alcoholism who is admitted to the ICU for septic shock 2/2 colitis, cellulitis, and possible UTI.       This patient has a central line              Reason for the central line remaining today? Line unnecessary, will be removed today     Neurology:  #Acute metabolic encephalopathy likely 2/2 sepsis (resolved)  - Patient remains A and O x3, and encephalopathy has resolved    #Chronic alcoholism   - [facility] protocol  discontinued, as patient has not required ativan since 10/8  - Continuing folic acid and multivitamin     #Parkinsonism:  - Patient continues to show tremors, however the patient is not on any home regimen for Parkinson's disease. Will continue to monitor.     Cardiovascular:  #Shock  #Septic vs Hypovolemia 2/2 Colitis  - Most recent lactate on 10/8 1.4 and stable  - Levophed was discontinued @ 0800 on 10/8              -Patient's BP's have been stable off Levo in 100s/60s  - Discontinued maintenance fluids due to CT/PE showing worsening interstitial edema  - Morning cortisol on 10/8 was 17 and wnl. Adrenal insufficiency less likely cause of polyuria.     #HTN/CHF   - Resumed home HTN and CHF meds: Carvedilol 25mg, Amlodipine 10mg, spironolactone 50mg, and bumex 2mg on 10/11  - Holding home lisinopril, and will consider resuming 10/12  - Echo 10/9 showed: Normal LV systolic function 60-65% EF. LV diastolic filling indeterminate. Aortic valve sclerosis     #HLD/ CVA  - Continue ASA 81 mg and atorvastatin 40 mg daily.        Pulmonary:  #Acute hypoxic respiratory failure 2/2 sepsis and possible COPD exacerbation   - Patient had episode of significant hypoxia and agitation 10/10 with SpO2 in 80s              -Tachycardic 140s, tachypnic, and found to be wheezing on exam concerning for COPD exacerbation vs flash pulmonary edema              -Increased O2 to 10L NC, and given 60mg Methylprednisone IV and DuoNebs              -CXR unremarkable for any acute process.              -ABG: pH 7.35, pO2 75, pCO2 42, HCO3 23.2              -CT/PE: No acute PE. Worseneing interstitial edema with ground glass opacities, and bilateral pleural effusions with right worse than left. Posterior basilar consolidation right worse than left.  - Discontinue IV Solu-Medrol, and give oral prednisone 50mg x3 days starting 10/12  - Restarted home spironolactone and bumex for diuresis of pulmonary edema  - Continue to wean O2 to maintain SpO2 >  88%  - Continue home albuterol inhalers PRN, Breo Ellipta scheduled, montelukast 10 mg.   - CT Abd/Pelvis 10/8: New irregular consolidation of right lower lobe with small areas of atelectasis and/or infiltrates. Trace pleural effusions              -Atelectasis and consolidation most likely due to poor function of right hemidiaphragm in addition to patient's body habitus and supine positioning  - Encourage bronchial hygiene and incentive spirometry use     Gastrointestinal:  #Chronic diarrhea:  #Colitis, rule out C. diff:  #Colitis with potential ileus  - C. Diff negative.   - Stool PCR, calprotectin, cyclospora pending  - Rectal tube removed 10/11  - CT Abd/Pelvis 10/8: Moderate generalized dilation of small bowel containing gas and fluid. Mild generalized dilation of of the colon also containing fluid and gas. Mild wall thickening of distal transverse colon. May reflect ileus and nonspecific infectious or inflammatory enterocolitis.  - Patient continues to tolerate regular diet well without worsening abdominal pain or nausea     #Elevated lipase, concern for acute pancreatitis  - Lipase level 155 on admission  - CT abdomen/pelvis did not show concerning signs of pancreatitis  - Fecal elastase pending     Renal/Urology:  #ITALO 2/2 sepsis and hypovolemia in the setting of chronic diarrhea and decreased PO intake (resolved)  #Polyuria most likely 2/2 resolving ITALO (resolved)  - Cr/BUN 2.12/37 on admission  - ITALO has resolved with most recent Cr 0.61 and BUN 5  - Urine output improving, 1.8L last 24 hours.   - Urine electrolytes showed elevated Na 77  - Discontinued maintenance fluids due to CT showing worsening interstitial edema.              -Restarted home spironolactone and bumex given findings concerning for pulmonary edema  - Discontinued Lord catheter 10/10     #Hyponatremia (resolved):  - Avoid rapid correction no more than 8 points per day  - Na 118 on admission, now 139     #Metabolic acidosis (resolved)    - On admission VBG 7.28/39/29/18  - Repeat VBG showed pH 7.31/pCO2 35/pO2 41/Bicarb 17.6/Lactate 1.4  - Continuing management     #Hyperkalemia (Resolved)     #Hypomagnesemia   - Magnesium improved s/p repletion 10/9. 1.94 from 1.5  - Continue to monitor with AM labs     #Hypophosphatemia  - Phosphorous improved s/p repletion 10/9. 2.7 from 1.9  - Continue to monitor with AM labs      Hematology/Oncology:  #Leukocytosis 2/2 sepsis  - Leukocytosis has improved. Most likely 2/2 dehydration and hemoconcentration. CTM.     Infectious Disease:  #Septic shock 2/2 colitis, cellulitis, possible UTI  - Discontinued ceftriaxone and metronidazole (10/7 - 10/7)  - Due to concern for C. Diff, patient was started on PO vancomycin  - PO vanc discontinued due to C. Diff being negative  - Starting IV vancomycin (10/7-10/9) and Zosyn (10/7 - )  - ID consulted: Recommend discontinuing vanc as skin changes more likely a result of venous stasis vs cellulitis. Continue Zosyn until all cultures come back  - IV Vancomycin discontinued 10/9   - Wound care consulted, appreciate recs  - Blood cultures negative 3 days growth on 10/10  - Urine culture negative     Endocrine:  #T2DM  - Mild SSI      Musculoskeletal/Skin:  - See ID section     Psychiatric:  - No active issues.     Fluids: PO PRN IV  Electrolytes: Replete as needed.  Nutrition: Adult diet 2-3g Na  GI prophylaxis: None  VTE prophylaxis: Lovenox  Abx: Ceftriaxone and metronidazole (10/7 - 10/7). PO vancomycin (10/7 - 10/7). IV vancomycin (10/7-10/9).  Zosyn (10/7 - )  Code Status: DNR, needs to be revised with the patient and family when she improves.  Lines/Tubes: Pending mid line placement, will remove central line once placed     Disposition: Patient admitted with septic shock likely secondary to colitis. On Zosyn. Patient no longer requiring Levophed, polyuria and abdominal pain improved. Patient stable for transfer to floors.    Rick Betancur MD

## 2023-10-11 NOTE — PROGRESS NOTES
"Physical Therapy                 Therapy Communication Note    Patient Name: Keisha Santa  MRN: [id number]  Today's Date: 10/11/2023     Discipline: Physical Therapy    Missed Visit Reason: Missed Visit Reason: Patient refused (per patient and RN \"I/she just got back to bed,I/she was up for several hours today, I just cant do anything more right now\" no tx.)    Missed Time: Attempt    Comment:  "

## 2023-10-11 NOTE — PROGRESS NOTES
"Keisha Santa is a 62 y.o. female on day 4 of admission presenting with Septic shock (CMS/HCC).    Subjective   Patient seen at bedside this morning.  Sitting in chair currently and appears improved.    Objective     Physical Exam  Constitutional:       Appearance: Normal appearance. She is normal weight.   HENT:      Head: Normocephalic and atraumatic.      Mouth/Throat:      Mouth: Mucous membranes are moist.   Cardiovascular:      Rate and Rhythm: Normal rate and regular rhythm.      Heart sounds: Normal heart sounds. No murmur heard.     No gallop.   Pulmonary:      Breath sounds: Normal breath sounds.   Abdominal:      Palpations: Abdomen is soft.   Musculoskeletal:         General: Normal range of motion.   Skin:     Capillary Refill: Capillary refill takes less than 2 seconds.      Findings: Lesion present.      Comments: Right lower extremity leg wound - no drainage   Neurological:      General: No focal deficit present.      Mental Status: She is alert and oriented to person, place, and time. Mental status is at baseline.   Psychiatric:         Mood and Affect: Mood normal.         Behavior: Behavior normal.         Last Recorded Vitals  Blood pressure 127/73, pulse 84, temperature 36.4 °C (97.5 °F), resp. rate 19, height 1.6 m (5' 2.99\"), weight 115 kg (252 lb 10.4 oz), SpO2 91 %.  Intake/Output last 3 Shifts:  I/O last 3 completed shifts:  In: 3157.7 (27.6 mL/kg) [P.O.:820; I.V.:1737.7 (15.2 mL/kg); IV Piggyback:600]  Out: 3100 (27.1 mL/kg) [Urine:3000 (0.7 mL/kg/hr); Stool:100]  Weight: 114.6 kg     Relevant Results  Scheduled medications  albuterol, 2.5 mg, nebulization, TID  amLODIPine, 10 mg, oral, Daily  aspirin, 81 mg, oral, Daily  atorvastatin, 40 mg, oral, Daily  bumetanide, 2 mg, oral, Daily  carvedilol, 25 mg, oral, BID with meals  enoxaparin, 40 mg, subcutaneous, q12h STEVE  influenza, 0.5 mL, intramuscular, During hospitalization  fluticasone furoate-vilanteroL, 1 puff, inhalation, " Daily  folic acid, 1 mg, oral, Daily  gabapentin, 400 mg, oral, BID  insulin lispro, 0-10 Units, subcutaneous, TID with meals  lidocaine, 5 mL, infiltration, Once  montelukast, 10 mg, oral, Daily  multivitamin with minerals, 1 tablet, oral, Daily  nystatin, , Topical, BID  piperacillin-tazobactam, 4.5 g, intravenous, q6h  [START ON 10/12/2023] predniSONE, 50 mg, oral, Daily  spironolactone, 50 mg, oral, Daily  traZODone, 150 mg, oral, Nightly      Continuous medications     PRN medications  PRN medications: acetaminophen, albuterol, albuterol, dextrose 10 % in water (D10W), dextrose, diclofenac sodium, EPINEPHrine, fluticasone, glucagon, ondansetron ODT **OR** ondansetron, oxyCODONE, oxyCODONE-acetaminophen, oxygen, zinc oxide  Results for orders placed or performed during the hospital encounter of 10/07/23 (from the past 24 hour(s))   POCT GLUCOSE   Result Value Ref Range    POCT Glucose 162 (H) 74 - 99 mg/dL   POCT GLUCOSE   Result Value Ref Range    POCT Glucose 174 (H) 74 - 99 mg/dL   Renal Function Panel   Result Value Ref Range    Glucose 116 (H) 74 - 99 mg/dL    Sodium 139 136 - 145 mmol/L    Potassium 4.5 3.5 - 5.3 mmol/L    Chloride 105 98 - 107 mmol/L    Bicarbonate 28 21 - 32 mmol/L    Anion Gap 11 10 - 20 mmol/L    Urea Nitrogen 5 (L) 6 - 23 mg/dL    Creatinine 0.61 0.50 - 1.05 mg/dL    eGFR >90 >60 mL/min/1.73m*2    Calcium 8.6 8.6 - 10.3 mg/dL    Phosphorus 3.9 2.5 - 4.9 mg/dL    Albumin 3.0 (L) 3.4 - 5.0 g/dL   CBC   Result Value Ref Range    WBC 7.8 4.4 - 11.3 x10*3/uL    nRBC 0.0 0.0 - 0.0 /100 WBCs    RBC 2.66 (L) 4.00 - 5.20 x10*6/uL    Hemoglobin 8.2 (L) 12.0 - 16.0 g/dL    Hematocrit 25.3 (L) 36.0 - 46.0 %    MCV 95 80 - 100 fL    MCH 30.8 26.0 - 34.0 pg    MCHC 32.4 32.0 - 36.0 g/dL    RDW 14.9 (H) 11.5 - 14.5 %    Platelets 352 150 - 450 x10*3/uL    MPV 8.7 7.5 - 11.5 fL   Magnesium   Result Value Ref Range    Magnesium 1.94 1.60 - 2.40 mg/dL   POCT GLUCOSE   Result Value Ref Range    POCT  Glucose 104 (H) 74 - 99 mg/dL       Assessment/Plan   Principal Problem:    Septic shock (CMS/[facility], located within [facility])  Active Problems:    CHF (congestive heart failure) (CMS/[facility], located within [facility])    Sepsis, pending blood cultures  - continue zosyn until cultures come back     Abdominal pain / Chronic diarrhea likely due to alcohol usage  - C. Diff negative  - Giardia, cryptosporidium, stool pathogen panel, calprotectin pending - labs are send out and will take time to process  - Stool PCR - results expected today  - on CIWA      Respiratory failure / COPD/ atelectasis  - monitor     Leg stasis  - stopping vancomycin  - PT/OT as tolerating     Kj Lopez DO  PGY-I Internal Medicine        Attending note : the patient was evaluated, the note was reviewed and up dated  Sepsis, the BC is negative  Abdominal pain / diarrhea, C. Diff is negative  Respiratory failure / COPD / atelectasis  Legs stasis  Encephalopathy, likely metabolic   Recommendations :  Continue Zosyn , anticipate to stop the antibiotics over the next couple of days    Lizbeth Vines MD.

## 2023-10-11 NOTE — CARE PLAN
The patient's goals for the shift include      The clinical goals for the shift include Pt will have a pain level less than 5 through shift.    Over the shift, the patient did not make progress toward the following goals. Barriers to progression include fear of increased pain. Recommendations to address these barriers include anxiety.

## 2023-10-12 LAB
ALBUMIN SERPL BCP-MCNC: 3.5 G/DL (ref 3.4–5)
ANION GAP SERPL CALC-SCNC: 14 MMOL/L (ref 10–20)
BUN SERPL-MCNC: 13 MG/DL (ref 6–23)
CALCIUM SERPL-MCNC: 9.1 MG/DL (ref 8.6–10.3)
CHLORIDE SERPL-SCNC: 101 MMOL/L (ref 98–107)
CO2 SERPL-SCNC: 29 MMOL/L (ref 21–32)
CREAT SERPL-MCNC: 0.81 MG/DL (ref 0.5–1.05)
ERYTHROCYTE [DISTWIDTH] IN BLOOD BY AUTOMATED COUNT: 15.2 % (ref 11.5–14.5)
GFR SERPL CREATININE-BSD FRML MDRD: 83 ML/MIN/1.73M*2
GLUCOSE BLD MANUAL STRIP-MCNC: 107 MG/DL (ref 74–99)
GLUCOSE BLD MANUAL STRIP-MCNC: 143 MG/DL (ref 74–99)
GLUCOSE BLD MANUAL STRIP-MCNC: 197 MG/DL (ref 74–99)
GLUCOSE BLD MANUAL STRIP-MCNC: 204 MG/DL (ref 74–99)
GLUCOSE SERPL-MCNC: 113 MG/DL (ref 74–99)
HCT VFR BLD AUTO: 30 % (ref 36–46)
HGB BLD-MCNC: 9.7 G/DL (ref 12–16)
MCH RBC QN AUTO: 31.1 PG (ref 26–34)
MCHC RBC AUTO-ENTMCNC: 32.3 G/DL (ref 32–36)
MCV RBC AUTO: 96 FL (ref 80–100)
NRBC BLD-RTO: 0.2 /100 WBCS (ref 0–0)
PHOSPHATE SERPL-MCNC: 3.5 MG/DL (ref 2.5–4.9)
PLATELET # BLD AUTO: 490 X10*3/UL (ref 150–450)
PMV BLD AUTO: 8.7 FL (ref 7.5–11.5)
POTASSIUM SERPL-SCNC: 4.3 MMOL/L (ref 3.5–5.3)
RBC # BLD AUTO: 3.12 X10*6/UL (ref 4–5.2)
SODIUM SERPL-SCNC: 140 MMOL/L (ref 136–145)
WBC # BLD AUTO: 10.5 X10*3/UL (ref 4.4–11.3)

## 2023-10-12 PROCEDURE — 96372 THER/PROPH/DIAG INJ SC/IM: CPT

## 2023-10-12 PROCEDURE — 94640 AIRWAY INHALATION TREATMENT: CPT

## 2023-10-12 PROCEDURE — 2500000001 HC RX 250 WO HCPCS SELF ADMINISTERED DRUGS (ALT 637 FOR MEDICARE OP)

## 2023-10-12 PROCEDURE — 80069 RENAL FUNCTION PANEL: CPT

## 2023-10-12 PROCEDURE — 82947 ASSAY GLUCOSE BLOOD QUANT: CPT

## 2023-10-12 PROCEDURE — 2500000002 HC RX 250 W HCPCS SELF ADMINISTERED DRUGS (ALT 637 FOR MEDICARE OP, ALT 636 FOR OP/ED)

## 2023-10-12 PROCEDURE — 2500000004 HC RX 250 GENERAL PHARMACY W/ HCPCS (ALT 636 FOR OP/ED)

## 2023-10-12 PROCEDURE — 85027 COMPLETE CBC AUTOMATED: CPT

## 2023-10-12 PROCEDURE — 1200000002 HC GENERAL ROOM WITH TELEMETRY DAILY

## 2023-10-12 PROCEDURE — 99233 SBSQ HOSP IP/OBS HIGH 50: CPT | Performed by: STUDENT IN AN ORGANIZED HEALTH CARE EDUCATION/TRAINING PROGRAM

## 2023-10-12 PROCEDURE — 99232 SBSQ HOSP IP/OBS MODERATE 35: CPT

## 2023-10-12 RX ORDER — ALBUTEROL SULFATE 0.83 MG/ML
2.5 SOLUTION RESPIRATORY (INHALATION) 3 TIMES DAILY
Status: DISCONTINUED | OUTPATIENT
Start: 2023-10-12 | End: 2023-10-14

## 2023-10-12 RX ORDER — DULOXETIN HYDROCHLORIDE 60 MG/1
60 CAPSULE, DELAYED RELEASE ORAL 2 TIMES DAILY
Status: DISCONTINUED | OUTPATIENT
Start: 2023-10-12 | End: 2023-10-16 | Stop reason: HOSPADM

## 2023-10-12 RX ORDER — LISINOPRIL 10 MG/1
10 TABLET ORAL DAILY
Status: DISCONTINUED | OUTPATIENT
Start: 2023-10-12 | End: 2023-10-13

## 2023-10-12 RX ORDER — SPIRONOLACTONE 25 MG/1
50 TABLET ORAL DAILY
Status: DISCONTINUED | OUTPATIENT
Start: 2023-10-12 | End: 2023-10-16 | Stop reason: HOSPADM

## 2023-10-12 RX ORDER — ACETAMINOPHEN 325 MG/1
650 TABLET ORAL EVERY 6 HOURS PRN
Status: DISCONTINUED | OUTPATIENT
Start: 2023-10-12 | End: 2023-10-16 | Stop reason: HOSPADM

## 2023-10-12 RX ORDER — AMLODIPINE BESYLATE 10 MG/1
10 TABLET ORAL DAILY
Status: DISCONTINUED | OUTPATIENT
Start: 2023-10-12 | End: 2023-10-16 | Stop reason: HOSPADM

## 2023-10-12 RX ORDER — SODIUM CHLORIDE 0.9 % (FLUSH) 0.9 %
10 SYRINGE (ML) INJECTION AS NEEDED
Status: DISCONTINUED | OUTPATIENT
Start: 2023-10-12 | End: 2023-10-16 | Stop reason: HOSPADM

## 2023-10-12 RX ORDER — SODIUM CHLORIDE 0.9 % (FLUSH) 0.9 %
10 SYRINGE (ML) INJECTION EVERY 12 HOURS
Status: DISCONTINUED | OUTPATIENT
Start: 2023-10-12 | End: 2023-10-16 | Stop reason: HOSPADM

## 2023-10-12 RX ORDER — ACETAMINOPHEN 325 MG/1
975 TABLET ORAL EVERY 6 HOURS PRN
Status: DISCONTINUED | OUTPATIENT
Start: 2023-10-12 | End: 2023-10-16 | Stop reason: HOSPADM

## 2023-10-12 RX ORDER — CARVEDILOL 25 MG/1
25 TABLET ORAL
Status: DISCONTINUED | OUTPATIENT
Start: 2023-10-12 | End: 2023-10-16 | Stop reason: HOSPADM

## 2023-10-12 RX ADMIN — INSULIN LISPRO 2 UNITS: 100 INJECTION, SOLUTION INTRAVENOUS; SUBCUTANEOUS at 17:08

## 2023-10-12 RX ADMIN — PIPERACILLIN SODIUM AND TAZOBACTAM SODIUM 4.5 G: 4; .5 INJECTION, SOLUTION INTRAVENOUS at 16:11

## 2023-10-12 RX ADMIN — PIPERACILLIN SODIUM AND TAZOBACTAM SODIUM 4.5 G: 4; .5 INJECTION, SOLUTION INTRAVENOUS at 22:05

## 2023-10-12 RX ADMIN — MONTELUKAST 10 MG: 10 TABLET, FILM COATED ORAL at 08:48

## 2023-10-12 RX ADMIN — ENOXAPARIN SODIUM 40 MG: 40 INJECTION SUBCUTANEOUS at 08:46

## 2023-10-12 RX ADMIN — Medication 10 ML: at 23:45

## 2023-10-12 RX ADMIN — BUMETANIDE 2 MG: 1 TABLET ORAL at 11:28

## 2023-10-12 RX ADMIN — GABAPENTIN 400 MG: 400 CAPSULE ORAL at 20:39

## 2023-10-12 RX ADMIN — GABAPENTIN 400 MG: 400 CAPSULE ORAL at 08:48

## 2023-10-12 RX ADMIN — ALBUTEROL SULFATE 2.5 MG: 2.5 SOLUTION RESPIRATORY (INHALATION) at 20:55

## 2023-10-12 RX ADMIN — Medication 10 ML: at 00:46

## 2023-10-12 RX ADMIN — FLUTICASONE FUROATE AND VILANTEROL TRIFENATATE 1 PUFF: 200; 25 POWDER RESPIRATORY (INHALATION) at 06:17

## 2023-10-12 RX ADMIN — NYSTATIN: 100000 CREAM TOPICAL at 20:42

## 2023-10-12 RX ADMIN — AMLODIPINE BESYLATE 10 MG: 10 TABLET ORAL at 12:15

## 2023-10-12 RX ADMIN — DULOXETINE HYDROCHLORIDE 60 MG: 60 CAPSULE, DELAYED RELEASE ORAL at 20:40

## 2023-10-12 RX ADMIN — LISINOPRIL 10 MG: 10 TABLET ORAL at 12:15

## 2023-10-12 RX ADMIN — DULOXETINE HYDROCHLORIDE 60 MG: 60 CAPSULE, DELAYED RELEASE ORAL at 12:16

## 2023-10-12 RX ADMIN — ALBUTEROL SULFATE 2.5 MG: 2.5 SOLUTION RESPIRATORY (INHALATION) at 15:33

## 2023-10-12 RX ADMIN — FOLIC ACID 1 MG: 1 TABLET ORAL at 08:46

## 2023-10-12 RX ADMIN — ATORVASTATIN CALCIUM 40 MG: 40 TABLET, FILM COATED ORAL at 20:40

## 2023-10-12 RX ADMIN — MULTIPLE VITAMINS W/ MINERALS TAB 1 TABLET: TAB at 08:48

## 2023-10-12 RX ADMIN — PIPERACILLIN SODIUM AND TAZOBACTAM SODIUM 4.5 G: 4; .5 INJECTION, SOLUTION INTRAVENOUS at 11:29

## 2023-10-12 RX ADMIN — ENOXAPARIN SODIUM 40 MG: 40 INJECTION SUBCUTANEOUS at 20:39

## 2023-10-12 RX ADMIN — PREDNISONE 50 MG: 20 TABLET ORAL at 08:47

## 2023-10-12 RX ADMIN — ACETAMINOPHEN 975 MG: 325 TABLET ORAL at 20:44

## 2023-10-12 RX ADMIN — SPIRONOLACTONE 50 MG: 25 TABLET ORAL at 12:16

## 2023-10-12 RX ADMIN — PIPERACILLIN SODIUM AND TAZOBACTAM SODIUM 4.5 G: 4; .5 INJECTION, SOLUTION INTRAVENOUS at 04:28

## 2023-10-12 RX ADMIN — ASPIRIN 81 MG: 81 TABLET, COATED ORAL at 08:47

## 2023-10-12 RX ADMIN — NYSTATIN: 100000 CREAM TOPICAL at 11:29

## 2023-10-12 RX ADMIN — TRAZODONE HYDROCHLORIDE 150 MG: 50 TABLET ORAL at 20:40

## 2023-10-12 RX ADMIN — CARVEDILOL 25 MG: 25 TABLET, FILM COATED ORAL at 17:08

## 2023-10-12 RX ADMIN — Medication 10 ML: at 12:16

## 2023-10-12 ASSESSMENT — COGNITIVE AND FUNCTIONAL STATUS - GENERAL
PERSONAL GROOMING: A LITTLE
MOBILITY SCORE: 14
TURNING FROM BACK TO SIDE WHILE IN FLAT BAD: A LITTLE
DRESSING REGULAR LOWER BODY CLOTHING: A LOT
WALKING IN HOSPITAL ROOM: TOTAL
CLIMB 3 TO 5 STEPS WITH RAILING: TOTAL
MOVING TO AND FROM BED TO CHAIR: A LITTLE
STANDING UP FROM CHAIR USING ARMS: A LOT
HELP NEEDED FOR BATHING: A LITTLE
TOILETING: A LOT
STANDING UP FROM CHAIR USING ARMS: A LOT
DRESSING REGULAR UPPER BODY CLOTHING: A LITTLE
TURNING FROM BACK TO SIDE WHILE IN FLAT BAD: A LITTLE
DAILY ACTIVITIY SCORE: 17
MOBILITY SCORE: 16
DAILY ACTIVITIY SCORE: 17
MOVING TO AND FROM BED TO CHAIR: A LITTLE
WALKING IN HOSPITAL ROOM: A LOT
CLIMB 3 TO 5 STEPS WITH RAILING: A LOT
PERSONAL GROOMING: A LITTLE
HELP NEEDED FOR BATHING: A LITTLE
TOILETING: A LOT
DRESSING REGULAR UPPER BODY CLOTHING: A LITTLE
DRESSING REGULAR LOWER BODY CLOTHING: A LOT

## 2023-10-12 ASSESSMENT — PAIN SCALES - GENERAL: PAINLEVEL_OUTOF10: 7

## 2023-10-12 NOTE — PROGRESS NOTES
"Physical Therapy                 Therapy Communication Note    Patient Name: Keisha Santa  MRN: [id number]  Today's Date: 10/12/2023     Discipline: Physical Therapy    Missed Visit Reason: Missed Visit Reason: Patient refused (kassie sitting EOB I with family at bedside and stated \"I have been up and down to the bathroom all day, and in the chair, I want to visit  a little longer and then I want to lay down, no therapy please\" notx.)    Missed Time: Attempt    Comment:  "

## 2023-10-12 NOTE — PROGRESS NOTES
Keisha Santa is a 62 y.o. female on day 5 of admission presenting with Septic shock (CMS/HCC).      Subjective   NAEO. Pt reports continued loose stools. States they are not watery, but semi-solid and green. Reports 2 episodes of loose stools this AM. Is on 4L NC, states she does not feel SOB at rest only when ambulating. Denies Chest pain, abdominal pain, n/v, f/c.        Objective     Last Recorded Vitals  /68 (BP Location: Left arm, Patient Position: Lying)   Pulse 62   Temp 36.4 °C (97.5 °F) (Temporal)   Resp 18   Wt 115 kg (252 lb 10.4 oz)   SpO2 98%   Intake/Output last 3 Shifts:    Intake/Output Summary (Last 24 hours) at 10/12/2023 1358  Last data filed at 10/12/2023 0730  Gross per 24 hour   Intake 200 ml   Output 1004 ml   Net -804 ml       Admission Weight  Weight: 109 kg (241 lb 2.9 oz) (Simultaneous filing. User may not have seen previous data.) (10/07/23 0430)    Daily Weight  10/11/23 : 115 kg (252 lb 10.4 oz)    Image Results  CT angio chest for pulmonary embolism  Narrative: Interpreted By:  Felix Valdovinos,   STUDY:  CT ANGIO CHEST FOR PULMONARY EMBOLISM;  10/10/2023 12:59 pm      INDICATION:  Signs/Symptoms:Acute hypoxia and tachycardia.      COMPARISON:  10/06/2023      ACCESSION NUMBER(S):  [id number]      ORDERING CLINICIAN:  VANDANA HAMMOND      TECHNIQUE:  Helical data acquisition of the chest was obtained after IV injection  of  66 ml of  Omnipaque 350. Images were reformatted in axial,  coronal, and sagittal planes. 3D reconstructed MIPS volumetric images  were also generated at an independent workstation and reviewed.      FINDINGS:  POTENTIAL LIMITATIONS OF THE STUDY: None      HEART AND VESSELS:  No discrete filling defects within the main pulmonary artery or its  branches.      The thoracic aorta is of normal course and caliber without aneurysmor  dissection. There is mild atherosclerotic calcification greatest at  the arch.      There is mild  coronary artery  atherosclerotic calcification.      The cardiac chambers are not enlarged.      MEDIASTINUM AND DIOGENES, LOWER NECK AND AXILLA:  The visualized thyroid gland is within normal limits, the esophagus  appears unremarkable.      There is mild soft tissue fullness at the diogenes bilaterally, developed  since the prior exam. This is probably due to lymphoid hyperplasia,  most likely reactive, and exacerbated by congestion.      LUNGS AND AIRWAYS:      There is inter lobar septal thickening greater at the upper lobes  since the previous examination indicating interstitial edema. There  are also scattered ground-glass opacities, also probably due to  congestion, although an area of pneumonia is possible. There is  consolidation of the majority of the right lower lobe including the  posterior basal segment and superior segments since the previous  exam. This may be due to compressive atelectasis and/or consolidating  pneumonia. There is mild compressive atelectasis at the left base  posteriorly.      UPPER ABDOMEN AND OTHER FINDINGS:  Development of ascites since the prior exam. There is also  subcutaneous edema right laterally at the upper abdominal wall. Small  hiatal hernia.      CHEST WALL AND OSSEOUS STRUCTURES:  There are no suspicious osseous lesions.      Impression: 1.  No evidence of pulmonary embolus, aneurysm or dissection.  2.  Interstitial edema including with ground-glass opacities, and  bilateral effusions greater on the right. Posterior basilar  consolidation greater on the right.  3. Ascites.      Signed by: Felix Valdovinos 10/10/2023 1:54 PM  Dictation workstation:   [id number]  XR chest 1 view  Narrative: Interpreted By:  Matilde Plummer,   STUDY:  XR CHEST 1 VIEW;  10/10/2023 10:32 am      INDICATION:  Signs/Symptoms:hyopoxia.      COMPARISON:  10/07/2023      ACCESSION NUMBER(S):  [id number]      ORDERING CLINICIAN:  ISIDRO HADDAD      FINDINGS:  The heart is normal in size.      Lung markings appear coarse. There  is elevation of the right  hemidiaphragm.      There are atherosclerotic changes of the aorta.      A jugular catheter is present on the right with the tip in the region  of the superior vena cava.      The patient is status post cervical spine fusion.      There are degenerative changes of the spine.      COMPARISON OF FINDING:  The chest is similar.      Impression: Elevated right hemidiaphragm. Coarse lung markings.      MACRO:  none      Signed by: Matilde lPummer 10/10/2023 10:45 AM  Dictation workstation:   [id number]      Physical Exam  Constitutional:       Appearance: Normal appearance. She is not ill-appearing.   HENT:      Head: Normocephalic and atraumatic.      Mouth/Throat:      Mouth: Mucous membranes are moist.      Pharynx: Oropharynx is clear.   Eyes:      Extraocular Movements: Extraocular movements intact.      Conjunctiva/sclera: Conjunctivae normal.      Pupils: Pupils are equal, round, and reactive to light.   Cardiovascular:      Rate and Rhythm: Normal rate and regular rhythm.   Pulmonary:      Effort: Pulmonary effort is normal. No respiratory distress.      Breath sounds: Wheezing present. No rhonchi or rales.   Abdominal:      General: Abdomen is flat. Bowel sounds are normal.      Palpations: Abdomen is soft.      Tenderness: There is no abdominal tenderness. There is no guarding or rebound.   Musculoskeletal:         General: Normal range of motion.   Skin:     General: Skin is warm and dry.   Neurological:      General: No focal deficit present.      Mental Status: She is alert. Mental status is at baseline.   Psychiatric:         Mood and Affect: Mood normal.         Relevant Results  Scheduled medications  albuterol, 2.5 mg, nebulization, TID  amLODIPine, 10 mg, oral, Daily  aspirin, 81 mg, oral, Daily  atorvastatin, 40 mg, oral, Daily  bumetanide, 2 mg, oral, Daily  carvedilol, 25 mg, oral, BID with meals  DULoxetine, 60 mg, oral, BID  enoxaparin, 40 mg, subcutaneous, q12h  STEVE  influenza, 0.5 mL, intramuscular, During hospitalization  fluticasone furoate-vilanteroL, 1 puff, inhalation, Daily  folic acid, 1 mg, oral, Daily  gabapentin, 400 mg, oral, BID  insulin lispro, 0-10 Units, subcutaneous, TID with meals  lidocaine, 5 mL, infiltration, Once  lisinopril, 10 mg, oral, Daily  montelukast, 10 mg, oral, Daily  multivitamin with minerals, 1 tablet, oral, Daily  nystatin, , Topical, BID  piperacillin-tazobactam, 4.5 g, intravenous, q6h  predniSONE, 50 mg, oral, Daily  sodium chloride 0.9%, 10 mL, intra-catheter, q12h  spironolactone, 50 mg, oral, Daily  traZODone, 150 mg, oral, Nightly      Continuous medications     PRN medications  PRN medications: acetaminophen, acetaminophen, [MAR Hold] albuterol, albuterol, dextrose 10 % in water (D10W), dextrose, diclofenac sodium, EPINEPHrine, fluticasone, glucagon, ondansetron ODT **OR** ondansetron, oxygen, sodium chloride 0.9%, zinc oxide    Results for orders placed or performed during the hospital encounter of 10/07/23 (from the past 24 hour(s))   POCT GLUCOSE   Result Value Ref Range    POCT Glucose 211 (H) 74 - 99 mg/dL   POCT GLUCOSE   Result Value Ref Range    POCT Glucose 210 (H) 74 - 99 mg/dL   CBC   Result Value Ref Range    WBC 10.5 4.4 - 11.3 x10*3/uL    nRBC 0.2 (H) 0.0 - 0.0 /100 WBCs    RBC 3.12 (L) 4.00 - 5.20 x10*6/uL    Hemoglobin 9.7 (L) 12.0 - 16.0 g/dL    Hematocrit 30.0 (L) 36.0 - 46.0 %    MCV 96 80 - 100 fL    MCH 31.1 26.0 - 34.0 pg    MCHC 32.3 32.0 - 36.0 g/dL    RDW 15.2 (H) 11.5 - 14.5 %    Platelets 490 (H) 150 - 450 x10*3/uL    MPV 8.7 7.5 - 11.5 fL   Renal function panel   Result Value Ref Range    Glucose 113 (H) 74 - 99 mg/dL    Sodium 140 136 - 145 mmol/L    Potassium 4.3 3.5 - 5.3 mmol/L    Chloride 101 98 - 107 mmol/L    Bicarbonate 29 21 - 32 mmol/L    Anion Gap 14 10 - 20 mmol/L    Urea Nitrogen 13 6 - 23 mg/dL    Creatinine 0.81 0.50 - 1.05 mg/dL    eGFR 83 >60 mL/min/1.73m*2    Calcium 9.1 8.6 - 10.3  mg/dL    Phosphorus 3.5 2.5 - 4.9 mg/dL    Albumin 3.5 3.4 - 5.0 g/dL   POCT GLUCOSE   Result Value Ref Range    POCT Glucose 107 (H) 74 - 99 mg/dL   POCT GLUCOSE   Result Value Ref Range    POCT Glucose 143 (H) 74 - 99 mg/dL         Assessment/Plan   This patient currently has cardiac telemetry ordered; if you would like to modify or discontinue the telemetry order, click here to go to the orders activity to modify/discontinue the order.  Keisha Santa is a 62 y.o. female on day 2 of admission with PMH of HTN, HLD, T2DM, MUKESH non-compliant with CPAP, COPD, Parkinsonism, cervical cancer, 2 CVAs, HFpEF w/ EF 65%, chronic diarrhea, PVD, and chronic alcoholism who was admitted to the ICU 10/9-10/11 for septic shock 2/2 colitis, cellulitis, and possible UTI. She has since stabilized and is transferred to the floor for management of acute hypoxic respiratory failure and colitis.     #Acute hypoxic respiratory failure 2/2 sepsis and possible COPD exacerbation   #pleural effusions R>L  ::In ICU: Patient had episode of significant hypoxia and agitation 10/10 with SpO2 in 80s during ICU admission, increased O2 requirement to 10L NC due to tachypnea, wheezing, and tachycardia  ::since stabilized and is HDS on 4L NC 10/12  ::Repeat CT PE 10/10 No acute PE. Worseneing interstitial edema with ground glass opacities, and bilateral pleural effusions with right worse than left. Posterior basilar consolidation right worse than left  -Weaning from IV solumedrol to oral prednisone 50mg x3 days on 10/12  -Continue home spironolactone 50mg and bumex 2mg for diuresis of pulmonary edema  -Continue to wean O2 to maintain SpO2 > 88%  -Continue home albuterol inhalers PRN, Breo Ellipta scheduled, montelukast 10 mg.   -Encourage bronchial hygiene and incentive spirometry use    #HTN/CHF   ::Echo 10/9 showed: Normal LV systolic function 60-65% EF. LV diastolic filling indeterminate. Aortic valve sclerosis  - Resumed home HTN and CHF meds:  Carvedilol 25mg BID, Amlodipine 10mg, spironolactone 50mg, and bumex 2mg on 10/11  - Started lisinopril 10mg daily on 10/12, if tolerated will increase to home dosage of lisinopril 40mg daily    #Chronic diarrhea  #Colitis with potential ileus  ::CT Abd/Pelvis 10/8: Moderate generalized dilation of small bowel containing gas and fluid. Mild generalized dilation of of the colon also containing fluid and gas. Mild wall thickening of distal transverse colon. May reflect ileus and nonspecific infectious or inflammatory enterocolitis  :: C. Diff negative.   :: Stool PCR negative  :: s/p rectal tube removal 10/11  :: Discontinued ceftriaxone and metronidazole (10/7 - 10/7)  :: Due to concern for C. Diff, patient was started on PO vancomycin, PO vanc discontinued due to C. Diff being negative  :: Started IV vancomycin (10/7-10/9) and Zosyn (10/7 - )  :: Urine culture negative  :: Blood cultures from 10/6 NG  - pending calprotectin, cyclospora  - ID consulted: Recommend discontinuing vanc as skin changes more likely a result of venous stasis vs cellulitis. Continue Zosyn until all cultures come back  - Patient continues to tolerate regular diet well without worsening abdominal pain or nausea  - will monitor  - consider imodium if infectious enterocolitis ruled out  - continue zosyn    #Venous tibial ulcer  - Wound care consulted, appreciate recs    Chronic Medical Issues:  #HLD/ CVA  - Continue home ASA 81 mg and atorvastatin 40 mg daily     #Chronic alcoholism   - CIWA protocol discontinued, as patient has not required ativan since 10/8  - Continuing folic acid and multivitamin     #Parkinsonism:  - Patient experiences occasional tremors, however the patient is not on any home regimen for Parkinson's disease.   - Will monitor.      #Elevated lipase, concern for acute pancreatitis  ::Lipase level 155 on admission  ::CT abdomen/pelvis did not show concerning signs of pancreatitis  - Fecal elastase pending    #T2DM  - Mild  SSI        Fluids: PO PRN IV  Electrolytes: Replete as needed.  Nutrition: Adult diet 2-3g Na  GI prophylaxis: None  VTE prophylaxis: Lovenox  Abx: Ceftriaxone and metronidazole (10/7 - 10/7). PO vancomycin (10/7 - 10/7). IV vancomycin (10/7-10/9).  Zosyn (10/7 - )  Code Status: DNR     Disposition: Patient on zosyn and 4L NC, pending potential discharge to SNF <48 hours        Principal Problem:    Septic shock (CMS/HCC)  Active Problems:    CHF (congestive heart failure) (CMS/HCC)        Debra Miller MD

## 2023-10-12 NOTE — NURSING NOTE
3964 Patient arrived to unit from ICU. Nurse to nurse report received. Medications received from previous nurse. Patient oriented to new room. Patient has no concerns or complaints at this time

## 2023-10-12 NOTE — CARE PLAN
The patient's goals for the shift include      The clinical goals for the shift include Patient will remain HDS throughout shift    Over the shift, the patient did not make progress toward the following goals. Barriers to progressioRecommendations to address these barriers include .

## 2023-10-12 NOTE — PROGRESS NOTES
"Keisha Santa is a 62 y.o. female on day 5 of admission presenting with Septic shock (CMS/HCC).    Subjective   Patient notes that she is continuing to have loose stools. States she did not have many bowel movements just before leaving the ICU, but is having more now that she has begun eating food. Now on 2L NC. Patient feels weak and fatigued, but states that she feels as if she is headed in the right direction.    No new complaints.       Objective     Physical Exam  Constitutional:       Appearance: She is obese.      Comments: Sitting up comfortably in chair. No acute distress.  HENT:      Head: Normocephalic and atraumatic.   Eyes:      Extraocular Movements: Extraocular movements intact.      Conjunctiva/sclera: Conjunctivae normal.      Pupils: Pupils are equal, round, and reactive to light.   Neck:      Comments: Right internal jugular Triple lumen catheter  Cardiovascular:      Comments: Regular rate and rhythm. No murmurs or rubs  Pulmonary:      Effort: Pulmonary effort is normal. No respiratory distress.      Breath sounds: Normal breath sounds. Wheezing in bilateral lung fields     Comments: Breathing comfortably on 2L NC.  Abdominal:      Abdomen is soft, non-tender, non-distended  Genitourinary:     Comments: Lord and rectal tube in place  Musculoskeletal:      Comments: Tenderness at left lower leg, present of open wound   Neurological:      General: No focal deficit present.      Mental Status: She is alert and oriented to person, place, and time.      Cranial Nerves: No cranial nerve deficit.     Last Recorded Vitals  Blood pressure 141/82, pulse 81, temperature 36.4 °C (97.5 °F), temperature source Temporal, resp. rate 18, height 1.6 m (5' 2.99\"), weight 115 kg (252 lb 10.4 oz), SpO2 96 %.  Intake/Output last 3 Shifts:  I/O last 3 completed shifts:  In: 961 (8.6 mL/kg) [P.O.:350; I.V.:111 (1 mL/kg); IV Piggyback:500]  Out: 2750 (24.5 mL/kg) [Urine:2650 (0.7 mL/kg/hr); Stool:100]  Dosing " Weight: 112.1 kg     Relevant Results           This patient currently has cardiac telemetry ordered; if you would like to modify or discontinue the telemetry order, click here to go to the orders activity to modify/discontinue the order.  Results for orders placed or performed during the hospital encounter of 10/07/23 (from the past 24 hour(s))   POCT GLUCOSE   Result Value Ref Range    POCT Glucose 211 (H) 74 - 99 mg/dL   POCT GLUCOSE   Result Value Ref Range    POCT Glucose 210 (H) 74 - 99 mg/dL   CBC   Result Value Ref Range    WBC 10.5 4.4 - 11.3 x10*3/uL    nRBC 0.2 (H) 0.0 - 0.0 /100 WBCs    RBC 3.12 (L) 4.00 - 5.20 x10*6/uL    Hemoglobin 9.7 (L) 12.0 - 16.0 g/dL    Hematocrit 30.0 (L) 36.0 - 46.0 %    MCV 96 80 - 100 fL    MCH 31.1 26.0 - 34.0 pg    MCHC 32.3 32.0 - 36.0 g/dL    RDW 15.2 (H) 11.5 - 14.5 %    Platelets 490 (H) 150 - 450 x10*3/uL    MPV 8.7 7.5 - 11.5 fL   Renal function panel   Result Value Ref Range    Glucose 113 (H) 74 - 99 mg/dL    Sodium 140 136 - 145 mmol/L    Potassium 4.3 3.5 - 5.3 mmol/L    Chloride 101 98 - 107 mmol/L    Bicarbonate 29 21 - 32 mmol/L    Anion Gap 14 10 - 20 mmol/L    Urea Nitrogen 13 6 - 23 mg/dL    Creatinine 0.81 0.50 - 1.05 mg/dL    eGFR 83 >60 mL/min/1.73m*2    Calcium 9.1 8.6 - 10.3 mg/dL    Phosphorus 3.5 2.5 - 4.9 mg/dL    Albumin 3.5 3.4 - 5.0 g/dL   POCT GLUCOSE   Result Value Ref Range    POCT Glucose 107 (H) 74 - 99 mg/dL   POCT GLUCOSE   Result Value Ref Range    POCT Glucose 143 (H) 74 - 99 mg/dL             Assessment/Plan   Principal Problem:    Septic shock (CMS/[facility])  Active Problems:    CHF (congestive heart failure) (CMS/[facility])    62-year-old woman with history of hypertension, diabetes, MUKESH, COPD, cervical cancer, chronic diarrhea admitted with    Metabolic encephalopathy due to sepsis, improved  2.   Septic shock due to colitis and possible cellulitis, improved  3.   ITALO due to sepsis/hypoperfusion, improving  4.   Acute hypoxic respiratory failure  due to CHF/pulmonary edema, improving    Recently transferred from ICU to floor.  On 2 L nasal cannula today.    -Continue abx per ID  -Continue course of prednisone for 5 days for suspected COPD exacerbation, continue nebs.  -Continue diuresis  -Encourage IS, wean O2 as tolerated       Constantine Dozier, DO  Internal Medicine PGY1

## 2023-10-12 NOTE — NURSING NOTE
Care assumed form night shift.  BS report completed.  Patient resting in bed NAD.  No question or concerns expressed at this time.     1051 PATIENT RESTING IN BED nad updateds on POC and follow up care   No cahnge form above patient resting ND

## 2023-10-12 NOTE — CONSULTS
Wound Care Consult     Visit Date: 10/11/2023      Patient Name: Keisha Santa         MRN: [id number]           YOB: 1960     Reason for Consult: Wound        Wound History: POA     Pertinent Labs:   Albumin   Date Value Ref Range Status   10/11/2023 3.0 (L) 3.4 - 5.0 g/dL Final       Wound Assessment:  Wound 10/07/23 Venous Ulcer Tibial Dorsal;Left;Proximal (Active)   Wound Image   10/11/23 1717   Site Assessment Dry 10/11/23 2000   Dressing Open to air 10/11/23 0800       Wound 10/07/23 Other (comment) Pelvis Anterior (Active)   Site Assessment Clean;Dry 10/11/23 2000   Suzy-Wound Assessment Intact 10/11/23 2000   Non-staged Wound Description Not applicable 10/11/23 2000   Shape no wound noted. 10/11/23 1748   Dressing Status Other (Comment) 10/11/23 1748       Wound 10/11/23 (Active)       Wound Team Summary Assessment: Patient visiting [place] from [place], had vasculature surgery while in [place] but has not worn compression rt warm weather.  Wound has been on left lateral calf for a time and sometimes bothers patient.  Wound bed placed xeroform for moist wound healing, mepilex for off loading pressure.  Discussed follow up with provider when returning to [place], compression as ordered and granddaughter wedding upcoming.       Wound Team Plan: Keep wound dressed to avoid contamination or injury, moist wound healing, consider compression, balance walking as able with elevation, eat healthy protein in balanced diet, definitely follow up with provider on return to [place] for reassessment of legs.  Message with questions.      Jennie Holland RN  10/11/2023  8:57 PM

## 2023-10-13 LAB
ALBUMIN SERPL BCP-MCNC: 3.2 G/DL (ref 3.4–5)
ANION GAP SERPL CALC-SCNC: 12 MMOL/L (ref 10–20)
BUN SERPL-MCNC: 15 MG/DL (ref 6–23)
CALCIUM SERPL-MCNC: 8.7 MG/DL (ref 8.6–10.3)
CHLORIDE SERPL-SCNC: 99 MMOL/L (ref 98–107)
CO2 SERPL-SCNC: 32 MMOL/L (ref 21–32)
CREAT SERPL-MCNC: 0.94 MG/DL (ref 0.5–1.05)
ERYTHROCYTE [DISTWIDTH] IN BLOOD BY AUTOMATED COUNT: 15.4 % (ref 11.5–14.5)
GFR SERPL CREATININE-BSD FRML MDRD: 69 ML/MIN/1.73M*2
GLUCOSE BLD MANUAL STRIP-MCNC: 105 MG/DL (ref 74–99)
GLUCOSE BLD MANUAL STRIP-MCNC: 135 MG/DL (ref 74–99)
GLUCOSE BLD MANUAL STRIP-MCNC: 177 MG/DL (ref 74–99)
GLUCOSE BLD MANUAL STRIP-MCNC: 214 MG/DL (ref 74–99)
GLUCOSE SERPL-MCNC: 114 MG/DL (ref 74–99)
HCT VFR BLD AUTO: 29.6 % (ref 36–46)
HGB BLD-MCNC: 9.4 G/DL (ref 12–16)
MAGNESIUM SERPL-MCNC: 1.63 MG/DL (ref 1.6–2.4)
MCH RBC QN AUTO: 30.3 PG (ref 26–34)
MCHC RBC AUTO-ENTMCNC: 31.8 G/DL (ref 32–36)
MCV RBC AUTO: 96 FL (ref 80–100)
NRBC BLD-RTO: 0.3 /100 WBCS (ref 0–0)
PHOSPHATE SERPL-MCNC: 3.7 MG/DL (ref 2.5–4.9)
PLATELET # BLD AUTO: 484 X10*3/UL (ref 150–450)
PMV BLD AUTO: 8.5 FL (ref 7.5–11.5)
POTASSIUM SERPL-SCNC: 3.4 MMOL/L (ref 3.5–5.3)
RBC # BLD AUTO: 3.1 X10*6/UL (ref 4–5.2)
SODIUM SERPL-SCNC: 140 MMOL/L (ref 136–145)
WBC # BLD AUTO: 9.3 X10*3/UL (ref 4.4–11.3)

## 2023-10-13 PROCEDURE — 2500000001 HC RX 250 WO HCPCS SELF ADMINISTERED DRUGS (ALT 637 FOR MEDICARE OP)

## 2023-10-13 PROCEDURE — 94640 AIRWAY INHALATION TREATMENT: CPT

## 2023-10-13 PROCEDURE — 97116 GAIT TRAINING THERAPY: CPT | Mod: GP

## 2023-10-13 PROCEDURE — 1200000002 HC GENERAL ROOM WITH TELEMETRY DAILY

## 2023-10-13 PROCEDURE — 83735 ASSAY OF MAGNESIUM: CPT

## 2023-10-13 PROCEDURE — 80069 RENAL FUNCTION PANEL: CPT

## 2023-10-13 PROCEDURE — 2500000002 HC RX 250 W HCPCS SELF ADMINISTERED DRUGS (ALT 637 FOR MEDICARE OP, ALT 636 FOR OP/ED)

## 2023-10-13 PROCEDURE — 2500000004 HC RX 250 GENERAL PHARMACY W/ HCPCS (ALT 636 FOR OP/ED)

## 2023-10-13 PROCEDURE — 85027 COMPLETE CBC AUTOMATED: CPT

## 2023-10-13 PROCEDURE — 96372 THER/PROPH/DIAG INJ SC/IM: CPT

## 2023-10-13 PROCEDURE — 99232 SBSQ HOSP IP/OBS MODERATE 35: CPT

## 2023-10-13 PROCEDURE — 99232 SBSQ HOSP IP/OBS MODERATE 35: CPT | Performed by: INTERNAL MEDICINE

## 2023-10-13 PROCEDURE — 97110 THERAPEUTIC EXERCISES: CPT | Mod: GP

## 2023-10-13 PROCEDURE — 82947 ASSAY GLUCOSE BLOOD QUANT: CPT

## 2023-10-13 RX ORDER — LISINOPRIL 10 MG/1
10 TABLET ORAL ONCE
Status: COMPLETED | OUTPATIENT
Start: 2023-10-13 | End: 2023-10-13

## 2023-10-13 RX ORDER — MAGNESIUM SULFATE HEPTAHYDRATE 40 MG/ML
2 INJECTION, SOLUTION INTRAVENOUS ONCE
Status: COMPLETED | OUTPATIENT
Start: 2023-10-13 | End: 2023-10-13

## 2023-10-13 RX ORDER — LISINOPRIL 20 MG/1
20 TABLET ORAL DAILY
Status: DISCONTINUED | OUTPATIENT
Start: 2023-10-14 | End: 2023-10-14

## 2023-10-13 RX ORDER — POTASSIUM CHLORIDE 20 MEQ/1
40 TABLET, EXTENDED RELEASE ORAL ONCE
Status: COMPLETED | OUTPATIENT
Start: 2023-10-13 | End: 2023-10-13

## 2023-10-13 RX ADMIN — ASPIRIN 81 MG: 81 TABLET, COATED ORAL at 08:53

## 2023-10-13 RX ADMIN — MAGNESIUM SULFATE HEPTAHYDRATE 2 G: 2 INJECTION, SOLUTION INTRAVENOUS at 12:10

## 2023-10-13 RX ADMIN — ALBUTEROL SULFATE 2.5 MG: 2.5 SOLUTION RESPIRATORY (INHALATION) at 20:46

## 2023-10-13 RX ADMIN — SPIRONOLACTONE 50 MG: 25 TABLET ORAL at 08:53

## 2023-10-13 RX ADMIN — LISINOPRIL 10 MG: 10 TABLET ORAL at 08:53

## 2023-10-13 RX ADMIN — PIPERACILLIN SODIUM AND TAZOBACTAM SODIUM 4.5 G: 4; .5 INJECTION, SOLUTION INTRAVENOUS at 16:12

## 2023-10-13 RX ADMIN — ALBUTEROL SULFATE 2.5 MG: 2.5 SOLUTION RESPIRATORY (INHALATION) at 08:21

## 2023-10-13 RX ADMIN — FLUTICASONE FUROATE AND VILANTEROL TRIFENATATE 1 PUFF: 200; 25 POWDER RESPIRATORY (INHALATION) at 07:00

## 2023-10-13 RX ADMIN — DULOXETINE HYDROCHLORIDE 60 MG: 60 CAPSULE, DELAYED RELEASE ORAL at 21:03

## 2023-10-13 RX ADMIN — INSULIN LISPRO 2 UNITS: 100 INJECTION, SOLUTION INTRAVENOUS; SUBCUTANEOUS at 12:11

## 2023-10-13 RX ADMIN — GABAPENTIN 400 MG: 400 CAPSULE ORAL at 08:53

## 2023-10-13 RX ADMIN — CARVEDILOL 25 MG: 25 TABLET, FILM COATED ORAL at 16:12

## 2023-10-13 RX ADMIN — TRAZODONE HYDROCHLORIDE 150 MG: 50 TABLET ORAL at 21:03

## 2023-10-13 RX ADMIN — LISINOPRIL 10 MG: 10 TABLET ORAL at 16:12

## 2023-10-13 RX ADMIN — BUMETANIDE 2 MG: 1 TABLET ORAL at 08:53

## 2023-10-13 RX ADMIN — MONTELUKAST 10 MG: 10 TABLET, FILM COATED ORAL at 08:52

## 2023-10-13 RX ADMIN — ENOXAPARIN SODIUM 40 MG: 40 INJECTION SUBCUTANEOUS at 21:03

## 2023-10-13 RX ADMIN — PIPERACILLIN SODIUM AND TAZOBACTAM SODIUM 4.5 G: 4; .5 INJECTION, SOLUTION INTRAVENOUS at 22:11

## 2023-10-13 RX ADMIN — CARVEDILOL 25 MG: 25 TABLET, FILM COATED ORAL at 08:53

## 2023-10-13 RX ADMIN — INSULIN LISPRO 4 UNITS: 100 INJECTION, SOLUTION INTRAVENOUS; SUBCUTANEOUS at 16:13

## 2023-10-13 RX ADMIN — ATORVASTATIN CALCIUM 40 MG: 40 TABLET, FILM COATED ORAL at 21:03

## 2023-10-13 RX ADMIN — GABAPENTIN 400 MG: 400 CAPSULE ORAL at 21:03

## 2023-10-13 RX ADMIN — DULOXETINE HYDROCHLORIDE 60 MG: 60 CAPSULE, DELAYED RELEASE ORAL at 08:53

## 2023-10-13 RX ADMIN — PREDNISONE 50 MG: 20 TABLET ORAL at 08:52

## 2023-10-13 RX ADMIN — ACETAMINOPHEN 975 MG: 325 TABLET ORAL at 21:12

## 2023-10-13 RX ADMIN — FOLIC ACID 1 MG: 1 TABLET ORAL at 08:53

## 2023-10-13 RX ADMIN — MULTIPLE VITAMINS W/ MINERALS TAB 1 TABLET: TAB at 08:53

## 2023-10-13 RX ADMIN — PIPERACILLIN SODIUM AND TAZOBACTAM SODIUM 4.5 G: 4; .5 INJECTION, SOLUTION INTRAVENOUS at 04:29

## 2023-10-13 RX ADMIN — NYSTATIN: 100000 CREAM TOPICAL at 21:04

## 2023-10-13 RX ADMIN — NYSTATIN: 100000 CREAM TOPICAL at 08:58

## 2023-10-13 RX ADMIN — AMLODIPINE BESYLATE 10 MG: 10 TABLET ORAL at 08:53

## 2023-10-13 RX ADMIN — PIPERACILLIN SODIUM AND TAZOBACTAM SODIUM 4.5 G: 4; .5 INJECTION, SOLUTION INTRAVENOUS at 09:51

## 2023-10-13 RX ADMIN — POTASSIUM CHLORIDE 40 MEQ: 1500 TABLET, EXTENDED RELEASE ORAL at 12:10

## 2023-10-13 RX ADMIN — ACETAMINOPHEN 975 MG: 325 TABLET ORAL at 14:16

## 2023-10-13 RX ADMIN — ENOXAPARIN SODIUM 40 MG: 40 INJECTION SUBCUTANEOUS at 08:52

## 2023-10-13 ASSESSMENT — COGNITIVE AND FUNCTIONAL STATUS - GENERAL
CLIMB 3 TO 5 STEPS WITH RAILING: A LITTLE
STANDING UP FROM CHAIR USING ARMS: A LITTLE
DAILY ACTIVITIY SCORE: 20
HELP NEEDED FOR BATHING: A LITTLE
CLIMB 3 TO 5 STEPS WITH RAILING: A LITTLE
WALKING IN HOSPITAL ROOM: A LITTLE
MOVING TO AND FROM BED TO CHAIR: A LITTLE
MOBILITY SCORE: 20
MOBILITY SCORE: 20
DRESSING REGULAR LOWER BODY CLOTHING: A LITTLE
WALKING IN HOSPITAL ROOM: A LITTLE
PERSONAL GROOMING: A LITTLE
STANDING UP FROM CHAIR USING ARMS: A LITTLE
TOILETING: A LITTLE
MOVING TO AND FROM BED TO CHAIR: A LITTLE

## 2023-10-13 ASSESSMENT — PAIN - FUNCTIONAL ASSESSMENT
PAIN_FUNCTIONAL_ASSESSMENT: 0-10
PAIN_FUNCTIONAL_ASSESSMENT: 0-10

## 2023-10-13 ASSESSMENT — PAIN SCALES - GENERAL
PAINLEVEL_OUTOF10: 0 - NO PAIN
PAINLEVEL_OUTOF10: 0 - NO PAIN
PAINLEVEL_OUTOF10: 8
PAINLEVEL_OUTOF10: 4

## 2023-10-13 NOTE — CARE PLAN
Problem: Transfers  Goal: STG - Patient will perform bed mobility with SBA  Outcome: Progressing  Goal: STG - Patient will transfer sit to and from stand CGAx1 with FWW  Outcome: Progressing

## 2023-10-13 NOTE — SIGNIFICANT EVENT
Pt. Refused breathing tx. Nurse was notified. She said she will take  her one later tonight but she was just too tired this afternoon.

## 2023-10-13 NOTE — PROGRESS NOTES
Occupational Therapy                 Therapy Communication Note    Patient Name: Keisha Santa  MRN: [id number]  Today's Date: 10/13/2023     Discipline: Occupational Therapy    Missed Visit Reason: Missed Visit Reason: Patient refused (Pt reported she has been doing well with getting up and wanted to rest in her chair. No OT treatment.)    Missed Time: Attempt

## 2023-10-13 NOTE — PROGRESS NOTES
Physical Therapy    Physical Therapy Treatment    Patient Name: Keisha Santa  MRN: [id number]  Today's Date: 10/13/2023  Time Calculation  Start Time: 1056  Stop Time: 1120  Time Calculation (min): 24 min       Assessment/Plan   PT Assessment  PT Assessment Results: Decreased strength, Decreased endurance, Decreased mobility  Rehab Prognosis: Good  Barriers to Discharge: Pt lives in [place] (Patient states she is planning to return to [place] on 10/22)  Evaluation/Treatment Tolerance: Patient tolerated treatment well  Medical Staff Made Aware: Yes  Barriers to Participation:  (Increase pain with movement)  End of Session Communication: Bedside nurse, Care Coordinator  Assessment Comment: Pt is a 78 yo female admitted with weakness and diarrhea. She presents with weakness and decreased endurance limiting her mobility which is alos limited by her abdominal pain. Pt would benefit from PT services at this time for strengthing and mobility trainiing to improve endurance and return towards prior level of function.  End of Session Patient Position: Up in chair (Granddtr present in room)  PT Plan  Inpatient/Swing Bed or Outpatient: Inpatient  PT Plan  Treatment/Interventions: Transfer training, Gait training, Balance training, Strengthening, Endurance training  PT Plan: Skilled PT  PT Frequency: 2 times per week  PT Discharge Recommendations: Low intensity level of continued care  PT Recommended Transfer Status: Assist x1      General Visit Information:   PT  Visit  PT Received On: 10/13/23  Response to Previous Treatment: Patient with no complaints from previous session.  General  Reason for Referral: Impaired mobility  Referred By: Mine Jones DO  Past Medical History Relevant to Rehab: Weakness, Diarreha  Family/Caregiver Present: Yes (Granddtr arrived mid session)  Prior to Session Communication: Bedside nurse  Patient Position Received:  (Sitting up at EOB)  Preferred Learning Style: verbal    Subjective    Precautions:  Precautions  Medical Precautions:  (2L O2, IV)  Vital Signs:  Vital Signs  Heart Rate: 78  SpO2: 94 % (following ambulation)    Objective   Pain:  Pain Assessment  Pain Assessment: 0-10  Pain Score: 0 - No pain  Cognition:  Cognition  Overall Cognitive Status: Within Functional Limits    Activity Tolerance:  Activity Tolerance  Endurance: Tolerates 10 - 20 min exercise with multiple rests  Treatments:  Therapeutic Exercise  Therapeutic Exercise Performed: Yes  Therapeutic Exercise Activity 1:  (ankle pumps B x 20; LAQ B x 20; Marching B x 20)         Bed Mobility  Bed Mobility:  (Patient reports she has been indepdently completing sit<>supine without staff assist. Frequently sitting at EOB for improved comfort)    Ambulation/Gait Training  Ambulation/Gait Training Performed: Yes  Ambulation/Gait Training 1  Surface 1: Level tile  Device 1: No device  Assistance 1: Close supervision  Quality of Gait 1:  (steady gait, decreased rita)  Comments/Distance (ft) 1: 40'  Transfers  Transfer: Yes  Transfer 1  Transfer From 1: Sit to  Transfer to 1: Stand  Transfer Level of Assistance 1: Close supervision  Transfers 2  Transfer From 2: Stand to  Transfer to 2: Sit  Transfer Level of Assistance 2: Close supervision  Transfers 3  Transfer From 3: Bed to  Transfer to 3: Chair with arms  Transfer Level of Assistance 3: Close supervision    Outcome Measures:  [facility] Basic Mobility  Turning from your back to your side while in a flat bed without using bedrails: None  Moving from lying on your back to sitting on the side of a flat bed without using bedrails: None  Moving to and from bed to chair (including a wheelchair): A little  Standing up from a chair using your arms (e.g. wheelchair or bedside chair): A little  To walk in hospital room: A little  Climbing 3-5 steps with railing: A little  Basic Mobility - Total Score: 20    Education Documentation  Body Mechanics, taught by Lenora Vee, PT at 10/13/2023  11:33 AM.  Learner: Patient  Readiness: Acceptance  Method: Explanation  Response: Verbalizes Understanding    ADL Training, taught by Lenora Vee PT at 10/13/2023 11:33 AM.  Learner: Patient  Readiness: Acceptance  Method: Explanation  Response: Verbalizes Understanding    Education Comments  No comments found.        OP EDUCATION:  Education  Individual(s) Educated: Patient  Education Provided: Other (Importance of O2 at this time, discussed SpO2 following tx with RN)  Diagnosis and Precautions: ICU monitor, IV via Central Venous Line R side. 2L O2, Catheter; Lord  Risk and Benefits Discussed with Patient/Caregiver/Other: yes  Patient/Caregiver Demonstrated Understanding: yes  Plan of Care Discussed and Agreed Upon: yes  Patient Response to Education: Patient/Caregiver Verbalized Understanding of Information  Education Comment: Educated patient on the benefits of particpating in therapy.    Encounter Problems       Encounter Problems (Active)       Balance       STG - Maintains dynamic standing balance without upper extremity support x 5' with dual task  (Progressing)       Start:  10/13/23    Expected End:  10/27/23       INTERVENTIONS:  1. Practice standing with minimal support.  2. Educate patient about standing tolerance.  3. Educate patient about independence with gait, transfers, and ADL's.  4. Educate patient about use of assistive device.  5. Educate patient about self-directed care.            Mobility       STG - Patient will ambulate without device x 150'  (Progressing)       Start:  10/13/23    Expected End:  10/27/23                 Transfers       STG - Patient will transfer sit to and from stand independently  (Progressing)       Start:  10/13/23    Expected End:  10/27/23

## 2023-10-13 NOTE — CARE PLAN
Problem: Balance  Goal: STG - Maintains dynamic standing balance without upper extremity support x 5' with dual task   Description: INTERVENTIONS:  1. Practice standing with minimal support.  2. Educate patient about standing tolerance.  3. Educate patient about independence with gait, transfers, and ADL's.  4. Educate patient about use of assistive device.  5. Educate patient about self-directed care.  Outcome: Progressing     Problem: Mobility  Goal: STG - Patient will ambulate without device x 150'   Outcome: Progressing     Problem: Transfers  Goal: STG - Patient will transfer sit to and from stand independently   Outcome: Progressing

## 2023-10-13 NOTE — PROGRESS NOTES
Keisha Santa is a 62 y.o. female on day 6 of admission presenting with Septic shock (CMS/HCC).    Subjective   Interval History: no fever, no new complaints        Review of Systems    Objective   Range of Vitals (last 24 hours)  Heart Rate:  [69-85]   Temp:  [35.9 °C (96.6 °F)-36.8 °C (98.2 °F)]   Resp:  [18]   BP: (137-163)/(76-99)   Weight:  [110 kg (242 lb 15.2 oz)]   SpO2:  [90 %-97 %]   Daily Weight  10/13/23 : 110 kg (242 lb 15.2 oz)    Body mass index is 43.05 kg/m².    Physical Exam  Constitutional:       Appearance: Normal appearance.   HENT:      Head: Normocephalic and atraumatic.      Mouth/Throat:      Mouth: Mucous membranes are moist.      Pharynx: Oropharynx is clear.   Eyes:      Pupils: Pupils are equal, round, and reactive to light.   Cardiovascular:      Rate and Rhythm: Normal rate and regular rhythm.      Heart sounds: Normal heart sounds.   Pulmonary:      Effort: Pulmonary effort is normal.      Breath sounds: Normal breath sounds.   Abdominal:      General: Abdomen is flat. Bowel sounds are normal.      Palpations: Abdomen is soft.   Musculoskeletal:      Cervical back: Normal range of motion.   Neurological:      Mental Status: She is alert.         Antibiotics  heparin (porcine) injection 7,500 Units  cefTRIAXone (Rocephin) IVPB 1 g  metroNIDAZOLE in NaCl (iso-os) (Flagyl)  mg  norepinephrine (Levophed) 8 mg in dextrose 5% 250 mL (0.032 mg/mL) infusion (premix)  flu vaccine (IIV4) age 6 months and greater, preservative free  lactated Ringer's bolus 1,000 mL  dilTIAZem (Cardizem CD) 24 hr capsule        amLODIPine (Norvasc) tablet  aspirin EC tablet  atorvastatin (Lipitor) tablet  bumetanide (Bumex) tablet  carvedilol (Coreg) tablet                montelukast (Singulair) tablet    albuterol 90 mcg/actuation inhaler 2 puff  albuterol 2.5 mg /3 mL (0.083 %) nebulizer solution 2.5 mg  aspirin EC tablet 81 mg  atorvastatin (Lipitor) tablet 40 mg  diclofenac sodium (Voltaren) 1 % gel  1 Application  EPINEPHrine (Epipen) injection syringe 0.3 mg  fluticasone (Flonase) nasal spray 2 spray  fluticasone furoate-vilanteroL (Breo Ellipta) 200-25 mcg/dose inhaler 1 puff  montelukast (Singulair) tablet 10 mg  dextrose 50 % injection 25 g  glucagon (Glucagen) injection 1 mg  dextrose 10 % in water (D10W) infusion  insulin lispro (HumaLOG) injection 0-5 Units  acetaminophen (Tylenol) tablet 650 mg  folic acid (Folvite) tablet 1 mg  multivitamin with minerals 1 tablet  LORazepam (Ativan) injection 0.5 mg  LORazepam (Ativan) injection 1 mg  LORazepam (Ativan) injection 2 mg  nystatin (Mycostatin) cream  vancomycin (Vancocin) capsule 125 mg  piperacillin-tazobactam-dextrose (Zosyn) IV 4.5 g  sodium chloride 0.9 % bolus 500 mL  vancomycin (Vancocin) 2,000 mg in dextrose 5 % in water (D5W) 500 mL IV  vancomycin in dextrose 5 % (Vancocin) IVPB 750 mg  atorvastatin (Lipitor) tablet 40 mg  diclofenac sodium (Voltaren) 1 % gel 1 Application  magnesium sulfate IV 2 g  sodium chloride 0.9 % bolus 1,000 mL  perflutren lipid microspheres (Definity) injection 0.5 mL  sulfur hexafluoride microsphr (Lumason) injection 24.28 mg  perflutren protein A microsphere (Optison) injection 0.5 mL  insulin lispro (HumaLOG) injection 0-10 Units  sodium chloride 0.9 % bolus 1,000 mL  HYDROmorphone (Dilaudid) injection 0.2 mg  gabapentin (Neurontin) capsule 300 mg  cyclobenzaprine (Flexeril) tablet 5 mg  sodium chloride 0.9 % bolus 1,000 mL  albuterol 2.5 mg /3 mL (0.083 %) nebulizer solution 2.5 mg  sodium chloride 0.9 % bolus 1,000 mL  sodium chloride 0.9 % bolus 1,000 mL  sodium chloride 0.9 % bolus 1,000 mL  dextrose 5 % in water (D5W) infusion  potassium phosphates 15 mmol in dextrose 5 % in water (D5W) 250 mL IV  vancomycin in dextrose 5 % (Vancocin) IVPB 1,000 mg  lactated Ringer's bolus 1,000 mL  sodium chloride 0.45 % infusion  oxyCODONE-acetaminophen (Percocet) 5-325 mg per tablet 1 tablet  oxyCODONE (Roxicodone) immediate  release tablet 10 mg  lactated Ringer's bolus 1,000 mL  lactated Ringer's bolus 1,000 mL  zinc oxide 20 % ointment 1 Application  gabapentin (Neurontin) capsule 400 mg  vancomycin (Vancocin) in dextrose 5% 250 mL IV 1,250 mg  piperacillin-tazobactam-dextrose (Zosyn) IV 4.5 g  atorvastatin (Lipitor) tablet 40 mg  magnesium sulfate IV 2 g  potassium phosphates 21 mmol in dextrose 5 % in water (D5W) 250 mL IV  enoxaparin (Lovenox) syringe 40 mg  ondansetron ODT (Zofran-ODT) disintegrating tablet 4 mg  ondansetron (Zofran) injection 4 mg  methylPREDNISolone sod succinate (PF) (SOLU-Medrol) 40 mg/mL injection  - Omnicell Override Pull  methylPREDNISolone sod succinate (PF) (SOLU-Medrol) injection 60 mg  oxygen (O2) therapy  lidocaine (Xylocaine) 10 mg/mL (1 %) injection 50 mg  sodium chloride 0.9% flush 10 mL  sodium chloride 0.9% flush 10 mL  iohexol (OMNIPaque) 350 mg iodine/mL injection 66 mL  traZODone (Desyrel) tablet 150 mg  amLODIPine (Norvasc) tablet 10 mg  carvedilol (Coreg) tablet 25 mg  spironolactone (Aldactone) tablet 50 mg  albuterol 2.5 mg /3 mL (0.083 %) nebulizer solution 2.5 mg  albuterol 2.5 mg /3 mL (0.083 %) nebulizer solution 2.5 mg  bumetanide (Bumex) tablet 2 mg  predniSONE (Deltasone) tablet 50 mg  acetaminophen (Tylenol) tablet 650 mg  acetaminophen (Tylenol) tablet 975 mg  amLODIPine (Norvasc) tablet 10 mg  carvedilol (Coreg) tablet 25 mg  DULoxetine (Cymbalta) DR capsule 60 mg  lisinopril tablet 10 mg  spironolactone (Aldactone) tablet 50 mg  albuterol 2.5 mg /3 mL (0.083 %) nebulizer solution 2.5 mg  oxygen (O2) therapy  potassium chloride CR (Klor-Con M20) ER tablet 40 mEq  magnesium sulfate IV 2 g      Relevant Results  Labs  Reviewed  Imaging  Reviewed    Assessment/Plan   Sepsis, the BC is negative  Abdominal pain / diarrhea, C. Diff is negative  Respiratory failure / COPD / atelectasis  Legs stasis  Encephalopathy, likely metabolic     Recommendations :  Continue Zosyn , anticipate to  stop the antibiotics over the next couple of days     I spent  minutes in the professional and overall care of this patient.      Lizbeth Garcia MD

## 2023-10-13 NOTE — PROGRESS NOTES
10/13/23 0939   Discharge Planning   Living Arrangements Spouse/significant other   Support Systems Spouse/significant other   Assistance Needed Independent with ADL's at baseline, ambulates with a walker and drives. Patient is from [place] and is here visiting family   Type of Residence Private residence   Home or Post Acute Services None   Type of Home Care Services Safety alert   Patient expects to be discharged to: Patient plan is to return to childrens home and is denying any needs at this time.   Does the patient need discharge transport arranged? No     10/13/23 1123:  Notified by doctor that patient wanted to go to SNF. Spoke to patient about SNF and she does not want to go to SNF and does not want home care because of her insurance. Patient and daughter in the room and agree patient is to discharge home with no needs. Patient to wean oxygen down before discharge.

## 2023-10-13 NOTE — PROGRESS NOTES
Keisha Santa is a 62 y.o. female on day 6 of admission presenting with Septic shock (CMS/HCC).      Subjective   NAEO. Pt weaned to 2L, ambulating in room without SOB. Continues to endorse occasional diarrhea. Expresses she would like to return to home rather than SNF.       Objective     Last Recorded Vitals  BP (!) 163/99 (BP Location: Left arm, Patient Position: Sitting)   Pulse 78   Temp 35.9 °C (96.6 °F) (Temporal)   Resp 18   Wt 110 kg (242 lb 15.2 oz)   SpO2 94% Comment: following ambulation  Intake/Output last 3 Shifts:    Intake/Output Summary (Last 24 hours) at 10/13/2023 1412  Last data filed at 10/13/2023 1133  Gross per 24 hour   Intake 1020 ml   Output --   Net 1020 ml       Admission Weight  Weight: 109 kg (241 lb 2.9 oz) (Simultaneous filing. User may not have seen previous data.) (10/07/23 0430)    Daily Weight  10/13/23 : 110 kg (242 lb 15.2 oz)    Image Results  CT angio chest for pulmonary embolism  Narrative: Interpreted By:  Felix Valdovinos,   STUDY:  CT ANGIO CHEST FOR PULMONARY EMBOLISM;  10/10/2023 12:59 pm      INDICATION:  Signs/Symptoms:Acute hypoxia and tachycardia.      COMPARISON:  10/06/2023      ACCESSION NUMBER(S):  [id number]      ORDERING CLINICIAN:  VANDANA HAMMOND      TECHNIQUE:  Helical data acquisition of the chest was obtained after IV injection  of  66 ml of  Omnipaque 350. Images were reformatted in axial,  coronal, and sagittal planes. 3D reconstructed MIPS volumetric images  were also generated at an independent workstation and reviewed.      FINDINGS:  POTENTIAL LIMITATIONS OF THE STUDY: None      HEART AND VESSELS:  No discrete filling defects within the main pulmonary artery or its  branches.      The thoracic aorta is of normal course and caliber without aneurysmor  dissection. There is mild atherosclerotic calcification greatest at  the arch.      There is mild  coronary artery atherosclerotic calcification.      The cardiac chambers are not enlarged.       MEDIASTINUM AND DIOGENES, LOWER NECK AND AXILLA:  The visualized thyroid gland is within normal limits, the esophagus  appears unremarkable.      There is mild soft tissue fullness at the diogenes bilaterally, developed  since the prior exam. This is probably due to lymphoid hyperplasia,  most likely reactive, and exacerbated by congestion.      LUNGS AND AIRWAYS:      There is inter lobar septal thickening greater at the upper lobes  since the previous examination indicating interstitial edema. There  are also scattered ground-glass opacities, also probably due to  congestion, although an area of pneumonia is possible. There is  consolidation of the majority of the right lower lobe including the  posterior basal segment and superior segments since the previous  exam. This may be due to compressive atelectasis and/or consolidating  pneumonia. There is mild compressive atelectasis at the left base  posteriorly.      UPPER ABDOMEN AND OTHER FINDINGS:  Development of ascites since the prior exam. There is also  subcutaneous edema right laterally at the upper abdominal wall. Small  hiatal hernia.      CHEST WALL AND OSSEOUS STRUCTURES:  There are no suspicious osseous lesions.      Impression: 1.  No evidence of pulmonary embolus, aneurysm or dissection.  2.  Interstitial edema including with ground-glass opacities, and  bilateral effusions greater on the right. Posterior basilar  consolidation greater on the right.  3. Ascites.      Signed by: Felix Valdovinos 10/10/2023 1:54 PM  Dictation workstation:   [id number]  XR chest 1 view  Narrative: Interpreted By:  Matilde Plummer,   STUDY:  XR CHEST 1 VIEW;  10/10/2023 10:32 am      INDICATION:  Signs/Symptoms:hyopoxia.      COMPARISON:  10/07/2023      ACCESSION NUMBER(S):  [id number]      ORDERING CLINICIAN:  ISIDRO HADDAD      FINDINGS:  The heart is normal in size.      Lung markings appear coarse. There is elevation of the right  hemidiaphragm.      There are atherosclerotic  changes of the aorta.      A jugular catheter is present on the right with the tip in the region  of the superior vena cava.      The patient is status post cervical spine fusion.      There are degenerative changes of the spine.      COMPARISON OF FINDING:  The chest is similar.      Impression: Elevated right hemidiaphragm. Coarse lung markings.      MACRO:  none      Signed by: Matilde Plummer 10/10/2023 10:45 AM  Dictation workstation:   [id number]      Physical Exam  Constitutional:       Appearance: Normal appearance. She is not ill-appearing.   HENT:      Head: Normocephalic and atraumatic.      Mouth/Throat:      Mouth: Mucous membranes are moist.      Pharynx: Oropharynx is clear.   Eyes:      Extraocular Movements: Extraocular movements intact.      Conjunctiva/sclera: Conjunctivae normal.      Pupils: Pupils are equal, round, and reactive to light.   Cardiovascular:      Rate and Rhythm: Normal rate and regular rhythm.   Pulmonary:      Effort: Pulmonary effort is normal. No respiratory distress.      Breath sounds: No wheezing, rhonchi or rales.   Abdominal:      General: Abdomen is flat. Bowel sounds are normal.      Palpations: Abdomen is soft.      Tenderness: There is no abdominal tenderness. There is no guarding or rebound.   Musculoskeletal:         General: Normal range of motion.   Skin:     General: Skin is warm and dry.   Neurological:      General: No focal deficit present.      Mental Status: She is alert. Mental status is at baseline.   Psychiatric:         Mood and Affect: Mood normal.         Relevant Results  Scheduled medications  albuterol, 2.5 mg, nebulization, TID  amLODIPine, 10 mg, oral, Daily  aspirin, 81 mg, oral, Daily  atorvastatin, 40 mg, oral, Daily  bumetanide, 2 mg, oral, Daily  carvedilol, 25 mg, oral, BID with meals  DULoxetine, 60 mg, oral, BID  enoxaparin, 40 mg, subcutaneous, q12h STEVE  influenza, 0.5 mL, intramuscular, During hospitalization  fluticasone  furoate-vilanteroL, 1 puff, inhalation, Daily  folic acid, 1 mg, oral, Daily  gabapentin, 400 mg, oral, BID  insulin lispro, 0-10 Units, subcutaneous, TID with meals  lidocaine, 5 mL, infiltration, Once  lisinopril, 10 mg, oral, Daily  montelukast, 10 mg, oral, Daily  multivitamin with minerals, 1 tablet, oral, Daily  nystatin, , Topical, BID  piperacillin-tazobactam, 4.5 g, intravenous, q6h  predniSONE, 50 mg, oral, Daily  sodium chloride 0.9%, 10 mL, intra-catheter, q12h  spironolactone, 50 mg, oral, Daily  traZODone, 150 mg, oral, Nightly      Continuous medications     PRN medications  PRN medications: acetaminophen, acetaminophen, [MAR Hold] albuterol, albuterol, dextrose 10 % in water (D10W), dextrose, diclofenac sodium, EPINEPHrine, fluticasone, glucagon, ondansetron ODT **OR** ondansetron, oxygen, sodium chloride 0.9%, zinc oxide    Results for orders placed or performed during the hospital encounter of 10/07/23 (from the past 24 hour(s))   POCT GLUCOSE   Result Value Ref Range    POCT Glucose 197 (H) 74 - 99 mg/dL   POCT GLUCOSE   Result Value Ref Range    POCT Glucose 204 (H) 74 - 99 mg/dL   CBC   Result Value Ref Range    WBC 9.3 4.4 - 11.3 x10*3/uL    nRBC 0.3 (H) 0.0 - 0.0 /100 WBCs    RBC 3.10 (L) 4.00 - 5.20 x10*6/uL    Hemoglobin 9.4 (L) 12.0 - 16.0 g/dL    Hematocrit 29.6 (L) 36.0 - 46.0 %    MCV 96 80 - 100 fL    MCH 30.3 26.0 - 34.0 pg    MCHC 31.8 (L) 32.0 - 36.0 g/dL    RDW 15.4 (H) 11.5 - 14.5 %    Platelets 484 (H) 150 - 450 x10*3/uL    MPV 8.5 7.5 - 11.5 fL   Renal Function Panel   Result Value Ref Range    Glucose 114 (H) 74 - 99 mg/dL    Sodium 140 136 - 145 mmol/L    Potassium 3.4 (L) 3.5 - 5.3 mmol/L    Chloride 99 98 - 107 mmol/L    Bicarbonate 32 21 - 32 mmol/L    Anion Gap 12 10 - 20 mmol/L    Urea Nitrogen 15 6 - 23 mg/dL    Creatinine 0.94 0.50 - 1.05 mg/dL    eGFR 69 >60 mL/min/1.73m*2    Calcium 8.7 8.6 - 10.3 mg/dL    Phosphorus 3.7 2.5 - 4.9 mg/dL    Albumin 3.2 (L) 3.4 - 5.0  g/dL   Magnesium   Result Value Ref Range    Magnesium 1.63 1.60 - 2.40 mg/dL   POCT GLUCOSE   Result Value Ref Range    POCT Glucose 105 (H) 74 - 99 mg/dL   POCT GLUCOSE   Result Value Ref Range    POCT Glucose 177 (H) 74 - 99 mg/dL         Assessment/Plan   This patient currently has cardiac telemetry ordered; if you would like to modify or discontinue the telemetry order, click here to go to the orders activity to modify/discontinue the order.      Keisha Santa is a 62 y.o. female on day 2 of admission with PMH of HTN, HLD, T2DM, MUKESH non-compliant with CPAP, COPD, Parkinsonism, cervical cancer, 2 CVAs, HFpEF w/ EF 65%, chronic diarrhea, PVD, and chronic alcoholism who was admitted to the ICU 10/9-10/11 for septic shock 2/2 colitis, cellulitis, and possible UTI. She has since stabilized and is transferred to the floor for management of acute hypoxic respiratory failure and colitis.      #Acute hypoxic respiratory failure 2/2 sepsis and possible COPD exacerbation   #pleural effusions R>L  ::In ICU: Patient had episode of significant hypoxia and agitation 10/10 with SpO2 in 80s during ICU admission, increased O2 requirement to 10L NC due to tachypnea, wheezing, and tachycardia  ::since stabilized and is HDS on 4L NC 10/12  ::Repeat CT PE 10/10 No acute PE. Worseneing interstitial edema with ground glass opacities, and bilateral pleural effusions with right worse than left. Posterior basilar consolidation right worse than left  -Weaning from IV solumedrol to oral prednisone 50mg x5 days on 10/12  -Continue home spironolactone 50mg and bumex 2mg for diuresis of pulmonary edema  -Continue to wean O2 to maintain SpO2 > 88%  -Continue home albuterol inhalers PRN, Breo Ellipta scheduled, montelukast 10 mg.   -Encourage bronchial hygiene and incentive spirometry use     #HTN/CHF   ::Echo 10/9 showed: Normal LV systolic function 60-65% EF. LV diastolic filling indeterminate. Aortic valve sclerosis  - Resumed home HTN  and CHF meds: Carvedilol 25mg BID, Amlodipine 10mg, spironolactone 50mg, and bumex 2mg on 10/11  - Started lisinopril 20mg daily on 10/12, if tolerated will increase to home dosage of lisinopril 40mg daily     #Chronic diarrhea  #Colitis with potential ileus  ::CT Abd/Pelvis 10/8: Moderate generalized dilation of small bowel containing gas and fluid. Mild generalized dilation of of the colon also containing fluid and gas. Mild wall thickening of distal transverse colon. May reflect ileus and nonspecific infectious or inflammatory enterocolitis  :: C. Diff negative.   :: Stool PCR negative  :: s/p rectal tube removal 10/11  :: Discontinued ceftriaxone and metronidazole (10/7 - 10/7)  :: Due to concern for C. Diff, patient was started on PO vancomycin, PO vanc discontinued due to C. Diff being negative  :: Started IV vancomycin (10/7-10/9) and Zosyn (10/7 - )  :: Urine culture negative  :: Blood cultures from 10/6 NG  - pending calprotectin, cyclospora  - ID consulted: Recommend discontinuing vanc as skin changes more likely a result of venous stasis vs cellulitis. Continue Zosyn until all cultures come back  - Patient continues to tolerate regular diet well without worsening abdominal pain or nausea  - will monitor  - consider imodium if infectious enterocolitis ruled out  - continue zosyn for final day 10/13 (completed 7 day course for colitis)     #Venous tibial ulcer  - Wound care consulted, appreciate recs     Chronic Medical Issues:  #HLD/ CVA  - Continue home ASA 81 mg and atorvastatin 40 mg daily      #Chronic alcoholism   - CIWA protocol discontinued, as patient has not required ativan since 10/8  - Continuing folic acid and multivitamin     #Parkinsonism:  - Patient experiences occasional tremors, however the patient is not on any home regimen for Parkinson's disease.   - Will monitor.      #Elevated lipase, concern for acute pancreatitis  ::Lipase level 155 on admission  ::CT abdomen/pelvis did not show  concerning signs of pancreatitis  - Fecal elastase pending     #T2DM  - Mild SSI         Fluids: PO PRN IV  Electrolytes: Replete as needed.  Nutrition: Adult diet 2-3g Na  GI prophylaxis: None  VTE prophylaxis: Lovenox  Abx: Ceftriaxone and metronidazole (10/7 - 10/7). PO vancomycin (10/7 - 10/7). IV vancomycin (10/7-10/9).  Zosyn (10/7 - )  Code Status: DNR     Disposition: Patient on zosyn and 2L NC, pending potential discharge to home <48 hours           Principal Problem:    Septic shock (CMS/HCC)  Active Problems:    CHF (congestive heart failure) (CMS/HCC)                Debra Miller MD

## 2023-10-13 NOTE — NURSING NOTE
1900 Nurse to Nurse bedside shift report received.     2035 Nurse present in room to administer scheduled medications. Patient took medications per order and without difficulty. Patient states she is having 7/10 pain in her back, PRN medication administered per order and without difficulty. Assessment completed.    2200 Nurse present in room to administer scheduled IV antibiotics. Midline site and dressing clean, dry and intact. Midline patent and flushes without difficulty.    2230 This nurse received a phone call from Patient sister Isabela requesting information. This nurse received verbal order from patient to release information.    2245 Nurse present in room to take down completed IV antibiotic. Patient resting comfortably in bed with eyes closed respirations even and unlabored.  Midline site and dressing clean, dry and intact.  Midline patent and flushes without difficulty.

## 2023-10-13 NOTE — CARE PLAN
Pt requested to see social work again this am with concerns re: completion of [facility] Financial assistance form.   We briefly reviewed form.  Family member in room will help pt complete and pt to obtain needed documents to be sent in with the form.  Will follow.  ADDED 1610 - Pt interested in HCPOA.  Met with pt, gave her Advance Directive packet.  Offered education, but she said she would like tolook at it first.  SW will return to assist with completion of document if pt chooses.

## 2023-10-13 NOTE — PROGRESS NOTES
"Keisha Santa is a 62 y.o. female on day 6 of admission presenting with Septic shock (CMS/HCC).    Subjective   Pt seen this morning. Has been having chronic diarrhea still. Ambulating more. Not able to eat much food as she feels full/distended.     Objective     Physical Exam  Constitutional:       Appearance: Normal appearance. She is normal weight.   HENT:      Head: Normocephalic and atraumatic.      Mouth/Throat:      Mouth: Mucous membranes are moist.   Cardiovascular:      Rate and Rhythm: Normal rate and regular rhythm.      Heart sounds: Normal heart sounds. No murmur heard.     No gallop.   Pulmonary:      Breath sounds: Normal breath sounds.   Abdominal:      General: There is distension.      Palpations: Abdomen is soft.   Musculoskeletal:      Comments: Right lower leg lesion - does not appear infected   Skin:     General: Skin is warm.      Capillary Refill: Capillary refill takes less than 2 seconds.   Neurological:      General: No focal deficit present.      Mental Status: She is alert and oriented to person, place, and time. Mental status is at baseline.   Psychiatric:         Mood and Affect: Mood normal.         Behavior: Behavior normal.         Last Recorded Vitals  Blood pressure 137/84, pulse 69, temperature 36.5 °C (97.7 °F), temperature source Temporal, resp. rate 18, height 1.6 m (5' 2.99\"), weight 110 kg (242 lb 15.2 oz), SpO2 90 %.  Intake/Output last 3 Shifts:  I/O last 3 completed shifts:  In: 200 (1.8 mL/kg) [IV Piggyback:200]  Out: 4 (0 mL/kg) [Urine:2 (0 mL/kg/hr); Stool:2]  Dosing Weight: 112.1 kg     Relevant Results  Scheduled medications  albuterol, 2.5 mg, nebulization, TID  amLODIPine, 10 mg, oral, Daily  aspirin, 81 mg, oral, Daily  atorvastatin, 40 mg, oral, Daily  bumetanide, 2 mg, oral, Daily  carvedilol, 25 mg, oral, BID with meals  DULoxetine, 60 mg, oral, BID  enoxaparin, 40 mg, subcutaneous, q12h STEVE  influenza, 0.5 mL, intramuscular, During " hospitalization  fluticasone furoate-vilanteroL, 1 puff, inhalation, Daily  folic acid, 1 mg, oral, Daily  gabapentin, 400 mg, oral, BID  insulin lispro, 0-10 Units, subcutaneous, TID with meals  lidocaine, 5 mL, infiltration, Once  lisinopril, 10 mg, oral, Daily  montelukast, 10 mg, oral, Daily  multivitamin with minerals, 1 tablet, oral, Daily  nystatin, , Topical, BID  piperacillin-tazobactam, 4.5 g, intravenous, q6h  predniSONE, 50 mg, oral, Daily  sodium chloride 0.9%, 10 mL, intra-catheter, q12h  spironolactone, 50 mg, oral, Daily  traZODone, 150 mg, oral, Nightly      Continuous medications     PRN medications  PRN medications: acetaminophen, acetaminophen, [MAR Hold] albuterol, albuterol, dextrose 10 % in water (D10W), dextrose, diclofenac sodium, EPINEPHrine, fluticasone, glucagon, ondansetron ODT **OR** ondansetron, oxygen, sodium chloride 0.9%, zinc oxide      Assessment/Plan   Principal Problem:    Septic shock (CMS/[facility])  Active Problems:    CHF (congestive heart failure) (CMS/[facility])    Sepsis  - blood cultures negative 4x days  - Zosyn started on 10/7  - can complete Zosyn today and monitor diarrhea     Abdominal pain / Chronic diarrhea likely due to alcohol usage  - C. Diff negative  - cryptosporidium, calprotectin pending - labs are send out and will take time to process  - Stool PCR, Giardia - negative  - on CIWA      Respiratory failure / COPD/ atelectasis  - monitor     Leg stasis  - stopping vancomycin  - PT/OT as tolerating     Kj Lopez DO  PGY-I Internal Medicine            Attending note : the patient was evaluated, the note was reviewed and up dated  Sepsis, the BC is negative  Abdominal pain / diarrhea, C. Diff is negative  Respiratory failure / COPD / atelectasis  Legs stasis  Encephalopathy, likely metabolic   Recommendations :  Can discontinue Zosyn      Lizbeth Vines MD.

## 2023-10-13 NOTE — PROGRESS NOTES
"Keisha Santa is a 62 y.o. female on day 6 of admission presenting with Septic shock (CMS/HCC).    Subjective   Patient notes that she is continuing to have frequent loose stools, but has been told this may be due to her antibiotics.    Reports that she has been using the incentive spirometer and has seen improvement with it. Currently on 2 L NC.     No new complaints.       Objective     Physical Exam  Constitutional:       Appearance: She is obese.      Comments: Sitting up comfortably in bed. No acute distress.  HENT:      Head: Normocephalic and atraumatic.   Eyes:      Extraocular Movements: Extraocular movements intact.      Conjunctiva/sclera: Conjunctivae normal.      Pupils: Pupils are equal, round, and reactive to light.   Neck:      Comments: Right internal jugular Triple lumen catheter  Cardiovascular:      Comments: Regular rate and rhythm. No murmurs or rubs  Pulmonary:      Effort: Pulmonary effort is normal. No respiratory distress.      Breath sounds: Normal breath sounds. Wheezing in bilateral lung fields     Comments: Breathing comfortably on 2L NC.  Abdominal:      Abdomen is soft, non-tender, non-distended  Genitourinary:     Comments: Lord and rectal tube in place  Musculoskeletal:      Comments: Tenderness at left lower leg, present of open wound   Neurological:      General: No focal deficit present.      Mental Status: She is alert and oriented to person, place, and time.      Cranial Nerves: No cranial nerve deficit.     Last Recorded Vitals  Blood pressure (!) 163/99, pulse 70, temperature 35.9 °C (96.6 °F), temperature source Temporal, resp. rate 18, height 1.6 m (5' 2.99\"), weight 110 kg (242 lb 15.2 oz), SpO2 95 %.  Intake/Output last 3 Shifts:  I/O last 3 completed shifts:  In: 200 (1.8 mL/kg) [IV Piggyback:200]  Out: 4 (0 mL/kg) [Urine:2 (0 mL/kg/hr); Stool:2]  Dosing Weight: 112.1 kg     Relevant Results           This patient currently has cardiac telemetry ordered; if you " would like to modify or discontinue the telemetry order, click here to go to the orders activity to modify/discontinue the order.  Results for orders placed or performed during the hospital encounter of 10/07/23 (from the past 24 hour(s))   POCT GLUCOSE   Result Value Ref Range    POCT Glucose 143 (H) 74 - 99 mg/dL   POCT GLUCOSE   Result Value Ref Range    POCT Glucose 197 (H) 74 - 99 mg/dL   POCT GLUCOSE   Result Value Ref Range    POCT Glucose 204 (H) 74 - 99 mg/dL   CBC   Result Value Ref Range    WBC 9.3 4.4 - 11.3 x10*3/uL    nRBC 0.3 (H) 0.0 - 0.0 /100 WBCs    RBC 3.10 (L) 4.00 - 5.20 x10*6/uL    Hemoglobin 9.4 (L) 12.0 - 16.0 g/dL    Hematocrit 29.6 (L) 36.0 - 46.0 %    MCV 96 80 - 100 fL    MCH 30.3 26.0 - 34.0 pg    MCHC 31.8 (L) 32.0 - 36.0 g/dL    RDW 15.4 (H) 11.5 - 14.5 %    Platelets 484 (H) 150 - 450 x10*3/uL    MPV 8.5 7.5 - 11.5 fL   Renal Function Panel   Result Value Ref Range    Glucose 114 (H) 74 - 99 mg/dL    Sodium 140 136 - 145 mmol/L    Potassium 3.4 (L) 3.5 - 5.3 mmol/L    Chloride 99 98 - 107 mmol/L    Bicarbonate 32 21 - 32 mmol/L    Anion Gap 12 10 - 20 mmol/L    Urea Nitrogen 15 6 - 23 mg/dL    Creatinine 0.94 0.50 - 1.05 mg/dL    eGFR 69 >60 mL/min/1.73m*2    Calcium 8.7 8.6 - 10.3 mg/dL    Phosphorus 3.7 2.5 - 4.9 mg/dL    Albumin 3.2 (L) 3.4 - 5.0 g/dL   Magnesium   Result Value Ref Range    Magnesium 1.63 1.60 - 2.40 mg/dL   POCT GLUCOSE   Result Value Ref Range    POCT Glucose 105 (H) 74 - 99 mg/dL           Assessment/Plan   Principal Problem:    Septic shock (CMS/[facility])  Active Problems:    CHF (congestive heart failure) (CMS/HCC)    62-year-old woman with history of hypertension, diabetes, MUKESH, COPD, cervical cancer, chronic diarrhea admitted with     1.  Sepsis due to colitis -improving, on Zosyn, ID following  2.  Acute hypoxic respiratory failure due to CHF and COPD exacerbation  -Continue home Bumex  -Continue 5 days course of steroids  -Recommend lower the dose of Prednisone  from 50mg to 40mg daily  -Continue nebs, encourage incentive spirometry, wean O2 as tolerated.  On 2 L nasal cannula today.  -May require increased diuresis if oxygenation does not improve.       Constantine Dozier, DO  Internal Medicine PGY1

## 2023-10-13 NOTE — CARE PLAN
The patient's goals for the shift include have a decrease in loose stools.    The clinical goals for the shift include Patient will have decrease in pain by end of shift    0855- Patient expressed that she was having multiple loose stools. Talked to Dr. Constantine Dozier about getting Imodium or something similar to slow down her diarrhea.     1740: Patient has had a decrease in loose stools throughout the day. Patient worked with physical therapy and was able to sit up in a chair for at least an hour. Family has visited her multiple times throughout the shift and is now resting comfortably. Patient denies further needs at this time.

## 2023-10-14 LAB
ALBUMIN SERPL BCP-MCNC: 3.5 G/DL (ref 3.4–5)
ANION GAP SERPL CALC-SCNC: 13 MMOL/L (ref 10–20)
BUN SERPL-MCNC: 15 MG/DL (ref 6–23)
CALCIUM SERPL-MCNC: 8.9 MG/DL (ref 8.6–10.3)
CHLORIDE SERPL-SCNC: 98 MMOL/L (ref 98–107)
CO2 SERPL-SCNC: 32 MMOL/L (ref 21–32)
CREAT SERPL-MCNC: 0.76 MG/DL (ref 0.5–1.05)
ERYTHROCYTE [DISTWIDTH] IN BLOOD BY AUTOMATED COUNT: 15.3 % (ref 11.5–14.5)
GFR SERPL CREATININE-BSD FRML MDRD: 89 ML/MIN/1.73M*2
GLUCOSE BLD MANUAL STRIP-MCNC: 112 MG/DL (ref 74–99)
GLUCOSE BLD MANUAL STRIP-MCNC: 156 MG/DL (ref 74–99)
GLUCOSE BLD MANUAL STRIP-MCNC: 171 MG/DL (ref 74–99)
GLUCOSE BLD MANUAL STRIP-MCNC: 185 MG/DL (ref 74–99)
GLUCOSE SERPL-MCNC: 107 MG/DL (ref 74–99)
HCT VFR BLD AUTO: 30.5 % (ref 36–46)
HGB BLD-MCNC: 9.8 G/DL (ref 12–16)
MAGNESIUM SERPL-MCNC: 1.89 MG/DL (ref 1.6–2.4)
MCH RBC QN AUTO: 30.7 PG (ref 26–34)
MCHC RBC AUTO-ENTMCNC: 32.1 G/DL (ref 32–36)
MCV RBC AUTO: 96 FL (ref 80–100)
NRBC BLD-RTO: 0.3 /100 WBCS (ref 0–0)
PHOSPHATE SERPL-MCNC: 3.5 MG/DL (ref 2.5–4.9)
PLATELET # BLD AUTO: 537 X10*3/UL (ref 150–450)
PMV BLD AUTO: 8.4 FL (ref 7.5–11.5)
POTASSIUM SERPL-SCNC: 3.4 MMOL/L (ref 3.5–5.3)
RBC # BLD AUTO: 3.19 X10*6/UL (ref 4–5.2)
SODIUM SERPL-SCNC: 140 MMOL/L (ref 136–145)
WBC # BLD AUTO: 10.6 X10*3/UL (ref 4.4–11.3)

## 2023-10-14 PROCEDURE — 96372 THER/PROPH/DIAG INJ SC/IM: CPT

## 2023-10-14 PROCEDURE — 83735 ASSAY OF MAGNESIUM: CPT

## 2023-10-14 PROCEDURE — 85027 COMPLETE CBC AUTOMATED: CPT

## 2023-10-14 PROCEDURE — 87493 C DIFF AMPLIFIED PROBE: CPT | Mod: CMCLAB,GEALAB

## 2023-10-14 PROCEDURE — 2500000001 HC RX 250 WO HCPCS SELF ADMINISTERED DRUGS (ALT 637 FOR MEDICARE OP)

## 2023-10-14 PROCEDURE — 1100000001 HC PRIVATE ROOM DAILY

## 2023-10-14 PROCEDURE — 94640 AIRWAY INHALATION TREATMENT: CPT

## 2023-10-14 PROCEDURE — 2500000004 HC RX 250 GENERAL PHARMACY W/ HCPCS (ALT 636 FOR OP/ED)

## 2023-10-14 PROCEDURE — 99232 SBSQ HOSP IP/OBS MODERATE 35: CPT | Performed by: STUDENT IN AN ORGANIZED HEALTH CARE EDUCATION/TRAINING PROGRAM

## 2023-10-14 PROCEDURE — 2500000002 HC RX 250 W HCPCS SELF ADMINISTERED DRUGS (ALT 637 FOR MEDICARE OP, ALT 636 FOR OP/ED)

## 2023-10-14 PROCEDURE — 82947 ASSAY GLUCOSE BLOOD QUANT: CPT

## 2023-10-14 PROCEDURE — 80069 RENAL FUNCTION PANEL: CPT

## 2023-10-14 PROCEDURE — 99232 SBSQ HOSP IP/OBS MODERATE 35: CPT | Performed by: INTERNAL MEDICINE

## 2023-10-14 RX ORDER — PETROLATUM 420 MG/G
1 OINTMENT TOPICAL
Status: DISCONTINUED | OUTPATIENT
Start: 2023-10-14 | End: 2023-10-16 | Stop reason: HOSPADM

## 2023-10-14 RX ORDER — ALBUTEROL SULFATE 0.83 MG/ML
2.5 SOLUTION RESPIRATORY (INHALATION)
Status: DISCONTINUED | OUTPATIENT
Start: 2023-10-14 | End: 2023-10-16 | Stop reason: HOSPADM

## 2023-10-14 RX ORDER — LISINOPRIL 20 MG/1
40 TABLET ORAL DAILY
Status: DISCONTINUED | OUTPATIENT
Start: 2023-10-15 | End: 2023-10-16 | Stop reason: HOSPADM

## 2023-10-14 RX ORDER — POTASSIUM CHLORIDE 20 MEQ/1
40 TABLET, EXTENDED RELEASE ORAL ONCE
Status: COMPLETED | OUTPATIENT
Start: 2023-10-14 | End: 2023-10-14

## 2023-10-14 RX ADMIN — ACETAMINOPHEN 650 MG: 325 TABLET ORAL at 20:45

## 2023-10-14 RX ADMIN — CARVEDILOL 25 MG: 25 TABLET, FILM COATED ORAL at 16:37

## 2023-10-14 RX ADMIN — INSULIN LISPRO 2 UNITS: 100 INJECTION, SOLUTION INTRAVENOUS; SUBCUTANEOUS at 12:36

## 2023-10-14 RX ADMIN — ENOXAPARIN SODIUM 40 MG: 40 INJECTION SUBCUTANEOUS at 20:39

## 2023-10-14 RX ADMIN — GABAPENTIN 400 MG: 400 CAPSULE ORAL at 10:04

## 2023-10-14 RX ADMIN — FOLIC ACID 1 MG: 1 TABLET ORAL at 10:04

## 2023-10-14 RX ADMIN — Medication 10 ML: at 12:37

## 2023-10-14 RX ADMIN — ALBUTEROL SULFATE 2.5 MG: 2.5 SOLUTION RESPIRATORY (INHALATION) at 08:28

## 2023-10-14 RX ADMIN — TRAZODONE HYDROCHLORIDE 150 MG: 50 TABLET ORAL at 20:39

## 2023-10-14 RX ADMIN — GABAPENTIN 400 MG: 400 CAPSULE ORAL at 20:39

## 2023-10-14 RX ADMIN — LISINOPRIL 20 MG: 20 TABLET ORAL at 10:04

## 2023-10-14 RX ADMIN — Medication 10 ML: at 23:53

## 2023-10-14 RX ADMIN — CARVEDILOL 25 MG: 25 TABLET, FILM COATED ORAL at 10:04

## 2023-10-14 RX ADMIN — ENOXAPARIN SODIUM 40 MG: 40 INJECTION SUBCUTANEOUS at 10:04

## 2023-10-14 RX ADMIN — ATORVASTATIN CALCIUM 40 MG: 40 TABLET, FILM COATED ORAL at 20:39

## 2023-10-14 RX ADMIN — POTASSIUM CHLORIDE 40 MEQ: 1500 TABLET, EXTENDED RELEASE ORAL at 11:05

## 2023-10-14 RX ADMIN — MULTIPLE VITAMINS W/ MINERALS TAB 1 TABLET: TAB at 10:04

## 2023-10-14 RX ADMIN — DULOXETINE HYDROCHLORIDE 60 MG: 60 CAPSULE, DELAYED RELEASE ORAL at 20:39

## 2023-10-14 RX ADMIN — AMLODIPINE BESYLATE 10 MG: 10 TABLET ORAL at 10:04

## 2023-10-14 RX ADMIN — Medication 10 ML: at 00:30

## 2023-10-14 RX ADMIN — NYSTATIN: 100000 CREAM TOPICAL at 20:41

## 2023-10-14 RX ADMIN — BUMETANIDE 2 MG: 1 TABLET ORAL at 10:04

## 2023-10-14 RX ADMIN — DULOXETINE HYDROCHLORIDE 60 MG: 60 CAPSULE, DELAYED RELEASE ORAL at 10:04

## 2023-10-14 RX ADMIN — SPIRONOLACTONE 50 MG: 25 TABLET ORAL at 10:04

## 2023-10-14 RX ADMIN — NYSTATIN: 100000 CREAM TOPICAL at 10:05

## 2023-10-14 RX ADMIN — PREDNISONE 50 MG: 20 TABLET ORAL at 10:03

## 2023-10-14 RX ADMIN — FLUTICASONE FUROATE AND VILANTEROL TRIFENATATE 1 PUFF: 200; 25 POWDER RESPIRATORY (INHALATION) at 10:05

## 2023-10-14 RX ADMIN — ASPIRIN 81 MG: 81 TABLET, COATED ORAL at 10:04

## 2023-10-14 RX ADMIN — INSULIN LISPRO 2 UNITS: 100 INJECTION, SOLUTION INTRAVENOUS; SUBCUTANEOUS at 16:37

## 2023-10-14 RX ADMIN — MONTELUKAST 10 MG: 10 TABLET, FILM COATED ORAL at 10:04

## 2023-10-14 ASSESSMENT — COGNITIVE AND FUNCTIONAL STATUS - GENERAL
DAILY ACTIVITIY SCORE: 22
CLIMB 3 TO 5 STEPS WITH RAILING: A LITTLE
MOBILITY SCORE: 23
HELP NEEDED FOR BATHING: A LITTLE
DRESSING REGULAR LOWER BODY CLOTHING: A LITTLE

## 2023-10-14 ASSESSMENT — PAIN - FUNCTIONAL ASSESSMENT
PAIN_FUNCTIONAL_ASSESSMENT: 0-10
PAIN_FUNCTIONAL_ASSESSMENT: 0-10

## 2023-10-14 ASSESSMENT — PAIN SCALES - GENERAL
PAINLEVEL_OUTOF10: 2
PAINLEVEL_OUTOF10: 4

## 2023-10-14 ASSESSMENT — PAIN SCALES - WONG BAKER: WONGBAKER_NUMERICALRESPONSE: HURTS LITTLE MORE

## 2023-10-14 NOTE — CARE PLAN
Problem: Skin  Goal: Participates in plan/prevention/treatment measures  10/14/2023 1221 by Zeke Lindsey RN  Outcome: Progressing  10/14/2023 1220 by Zeke Lindsey RN  Outcome: Progressing  Goal: Prevent/manage excess moisture  10/14/2023 1221 by Zeke Lindsey RN  Outcome: Progressing  10/14/2023 1220 by Zeke Lindsey RN  Outcome: Progressing  Goal: Prevent/minimize sheer/friction injuries  10/14/2023 1221 by Zeke Lindsey RN  Outcome: Progressing  10/14/2023 1220 by Zeke Lindsey RN  Outcome: Progressing  Goal: Promote/optimize nutrition  10/14/2023 1221 by Zeke Lindsey RN  Outcome: Progressing  10/14/2023 1220 by Zeke Lindsey RN  Outcome: Progressing     Problem: Heart Failure  Goal: Improved gas exchange this shift  10/14/2023 1221 by Zeke Lindsey RN  Outcome: Progressing  10/14/2023 1220 by Zeke Lindsey RN  Outcome: Progressing  Goal: Improved urinary output this shift  10/14/2023 1221 by Zeke Lindsey RN  Outcome: Progressing  10/14/2023 1220 by Zeke Lindsey RN  Outcome: Progressing  Goal: Reduction in peripheral edema within 24 hours  10/14/2023 1221 by Zeke Lindsey RN  Outcome: Progressing  10/14/2023 1220 by Zeke Lindsey RN  Outcome: Progressing  Goal: Report improvement of dyspnea/breathlessness this shift  10/14/2023 1221 by Zeke Lindsey RN  Outcome: Progressing  10/14/2023 1220 by Zeke Lindsey RN  Outcome: Progressing  Goal: Weight from fluid excess reduced over 2-3 days, then stabilize  10/14/2023 1221 by Zeke Lindsey RN  Outcome: Progressing  10/14/2023 1220 by Zeke Lindsey RN  Outcome: Progressing  Goal: Increase self care and/or family involvement in 24 hours  10/14/2023 1221 by Zeke Lindsey RN  Outcome: Progressing  10/14/2023 1220 by Zeke Lindsey RN  Outcome: Progressing   The patient's goals for the shift include Pt will have decreased irritation to skin during shift.    The clinical goals for the shift include Pt will have decreased loose stool during shift

## 2023-10-14 NOTE — CARE PLAN
The clinical goals for the shift include have a decrease in loose stools by the end of the shift

## 2023-10-14 NOTE — PROGRESS NOTES
Keisha Santa is a 62 y.o. female on day 7 of admission presenting with Septic shock (CMS/HCC).      Subjective   NAEO. Pt continues to endorse high stool output (~7 stools daily) that are watery in volume. Was able to wean off of 2L NC overnight, now on RA. Otherwise denies f/c, n/v, abdominal pain, SOB, chest pain.    Objective     Last Recorded Vitals  BP (!) 164/95 (BP Location: Left arm, Patient Position: Sitting)   Pulse 70   Temp 36.5 °C (97.7 °F) (Skin)   Resp 18   Wt 108 kg (237 lb 14 oz)   SpO2 96%   Intake/Output last 3 Shifts:    Intake/Output Summary (Last 24 hours) at 10/14/2023 1426  Last data filed at 10/13/2023 2241  Gross per 24 hour   Intake 200 ml   Output --   Net 200 ml       Admission Weight  Weight: 109 kg (241 lb 2.9 oz) (Simultaneous filing. User may not have seen previous data.) (10/07/23 0430)    Daily Weight  10/14/23 : 108 kg (237 lb 14 oz)    Image Results  CT angio chest for pulmonary embolism  Narrative: Interpreted By:  Felix Valdovinos,   STUDY:  CT ANGIO CHEST FOR PULMONARY EMBOLISM;  10/10/2023 12:59 pm      INDICATION:  Signs/Symptoms:Acute hypoxia and tachycardia.      COMPARISON:  10/06/2023      ACCESSION NUMBER(S):  [id number]      ORDERING CLINICIAN:  VANDANA HAMMOND      TECHNIQUE:  Helical data acquisition of the chest was obtained after IV injection  of  66 ml of  Omnipaque 350. Images were reformatted in axial,  coronal, and sagittal planes. 3D reconstructed MIPS volumetric images  were also generated at an independent workstation and reviewed.      FINDINGS:  POTENTIAL LIMITATIONS OF THE STUDY: None      HEART AND VESSELS:  No discrete filling defects within the main pulmonary artery or its  branches.      The thoracic aorta is of normal course and caliber without aneurysmor  dissection. There is mild atherosclerotic calcification greatest at  the arch.      There is mild  coronary artery atherosclerotic calcification.      The cardiac chambers are not enlarged.       MEDIASTINUM AND DIOGENES, LOWER NECK AND AXILLA:  The visualized thyroid gland is within normal limits, the esophagus  appears unremarkable.      There is mild soft tissue fullness at the diogenes bilaterally, developed  since the prior exam. This is probably due to lymphoid hyperplasia,  most likely reactive, and exacerbated by congestion.      LUNGS AND AIRWAYS:      There is inter lobar septal thickening greater at the upper lobes  since the previous examination indicating interstitial edema. There  are also scattered ground-glass opacities, also probably due to  congestion, although an area of pneumonia is possible. There is  consolidation of the majority of the right lower lobe including the  posterior basal segment and superior segments since the previous  exam. This may be due to compressive atelectasis and/or consolidating  pneumonia. There is mild compressive atelectasis at the left base  posteriorly.      UPPER ABDOMEN AND OTHER FINDINGS:  Development of ascites since the prior exam. There is also  subcutaneous edema right laterally at the upper abdominal wall. Small  hiatal hernia.      CHEST WALL AND OSSEOUS STRUCTURES:  There are no suspicious osseous lesions.      Impression: 1.  No evidence of pulmonary embolus, aneurysm or dissection.  2.  Interstitial edema including with ground-glass opacities, and  bilateral effusions greater on the right. Posterior basilar  consolidation greater on the right.  3. Ascites.      Signed by: Felix Valdovinos 10/10/2023 1:54 PM  Dictation workstation:   [id number]  XR chest 1 view  Narrative: Interpreted By:  Matilde lPummer,   STUDY:  XR CHEST 1 VIEW;  10/10/2023 10:32 am      INDICATION:  Signs/Symptoms:hyopoxia.      COMPARISON:  10/07/2023      ACCESSION NUMBER(S):  [id number]      ORDERING CLINICIAN:  ISIDRO HADDAD      FINDINGS:  The heart is normal in size.      Lung markings appear coarse. There is elevation of the right  hemidiaphragm.      There are atherosclerotic  changes of the aorta.      A jugular catheter is present on the right with the tip in the region  of the superior vena cava.      The patient is status post cervical spine fusion.      There are degenerative changes of the spine.      COMPARISON OF FINDING:  The chest is similar.      Impression: Elevated right hemidiaphragm. Coarse lung markings.      MACRO:  none      Signed by: Matilde Plummer 10/10/2023 10:45 AM  Dictation workstation:   [id number]      Physical Exam  Constitutional:       Appearance: Normal appearance. She is not ill-appearing.   HENT:      Head: Normocephalic and atraumatic.      Mouth/Throat:      Mouth: Mucous membranes are moist.      Pharynx: Oropharynx is clear.   Eyes:      Extraocular Movements: Extraocular movements intact.      Conjunctiva/sclera: Conjunctivae normal.      Pupils: Pupils are equal, round, and reactive to light.   Cardiovascular:      Rate and Rhythm: Normal rate and regular rhythm.   Pulmonary:      Effort: Pulmonary effort is normal. No respiratory distress.      Breath sounds: Wheezing present. No rhonchi or rales.   Abdominal:      General: Abdomen is flat. Bowel sounds are normal.      Palpations: Abdomen is soft.      Tenderness: There is no abdominal tenderness. There is no guarding or rebound.   Musculoskeletal:         General: Normal range of motion.   Skin:     General: Skin is warm and dry.   Neurological:      General: No focal deficit present.      Mental Status: She is alert. Mental status is at baseline.   Psychiatric:         Mood and Affect: Mood normal.         Relevant Results  Scheduled medications  albuterol, 2.5 mg, nebulization, TID  amLODIPine, 10 mg, oral, Daily  aspirin, 81 mg, oral, Daily  atorvastatin, 40 mg, oral, Daily  bumetanide, 2 mg, oral, Daily  carvedilol, 25 mg, oral, BID with meals  DULoxetine, 60 mg, oral, BID  enoxaparin, 40 mg, subcutaneous, q12h STEVE  influenza, 0.5 mL, intramuscular, During hospitalization  fluticasone  furoate-vilanteroL, 1 puff, inhalation, Daily  folic acid, 1 mg, oral, Daily  gabapentin, 400 mg, oral, BID  insulin lispro, 0-10 Units, subcutaneous, TID with meals  lidocaine, 5 mL, infiltration, Once  lisinopril, 20 mg, oral, Daily  montelukast, 10 mg, oral, Daily  multivitamin with minerals, 1 tablet, oral, Daily  nystatin, , Topical, BID  [START ON 10/15/2023] predniSONE, 30 mg, oral, Daily  sodium chloride 0.9%, 10 mL, intra-catheter, q12h  spironolactone, 50 mg, oral, Daily  traZODone, 150 mg, oral, Nightly      Continuous medications     PRN medications  PRN medications: acetaminophen, acetaminophen, [MAR Hold] albuterol, albuterol, dextrose 10 % in water (D10W), dextrose, diclofenac sodium, EPINEPHrine, fluticasone, glucagon, ondansetron ODT **OR** ondansetron, sodium chloride 0.9%, white petrolatum, zinc oxide    Results for orders placed or performed during the hospital encounter of 10/07/23 (from the past 24 hour(s))   POCT GLUCOSE   Result Value Ref Range    POCT Glucose 214 (H) 74 - 99 mg/dL   POCT GLUCOSE   Result Value Ref Range    POCT Glucose 135 (H) 74 - 99 mg/dL   POCT GLUCOSE   Result Value Ref Range    POCT Glucose 112 (H) 74 - 99 mg/dL   CBC   Result Value Ref Range    WBC 10.6 4.4 - 11.3 x10*3/uL    nRBC 0.3 (H) 0.0 - 0.0 /100 WBCs    RBC 3.19 (L) 4.00 - 5.20 x10*6/uL    Hemoglobin 9.8 (L) 12.0 - 16.0 g/dL    Hematocrit 30.5 (L) 36.0 - 46.0 %    MCV 96 80 - 100 fL    MCH 30.7 26.0 - 34.0 pg    MCHC 32.1 32.0 - 36.0 g/dL    RDW 15.3 (H) 11.5 - 14.5 %    Platelets 537 (H) 150 - 450 x10*3/uL    MPV 8.4 7.5 - 11.5 fL   Renal Function Panel   Result Value Ref Range    Glucose 107 (H) 74 - 99 mg/dL    Sodium 140 136 - 145 mmol/L    Potassium 3.4 (L) 3.5 - 5.3 mmol/L    Chloride 98 98 - 107 mmol/L    Bicarbonate 32 21 - 32 mmol/L    Anion Gap 13 10 - 20 mmol/L    Urea Nitrogen 15 6 - 23 mg/dL    Creatinine 0.76 0.50 - 1.05 mg/dL    eGFR 89 >60 mL/min/1.73m*2    Calcium 8.9 8.6 - 10.3 mg/dL     Phosphorus 3.5 2.5 - 4.9 mg/dL    Albumin 3.5 3.4 - 5.0 g/dL   Magnesium   Result Value Ref Range    Magnesium 1.89 1.60 - 2.40 mg/dL   POCT GLUCOSE   Result Value Ref Range    POCT Glucose 156 (H) 74 - 99 mg/dL          Assessment/Plan      Keisha Santa is a 62 y.o. female on day 2 of admission with PMH of HTN, HLD, T2DM, MUKESH non-compliant with CPAP, COPD, Parkinsonism, cervical cancer, 2 CVAs, HFpEF w/ EF 65%, chronic diarrhea, PVD, and chronic alcoholism who was admitted to the ICU 10/9-10/11 for septic shock 2/2 colitis, cellulitis, and possible UTI. She has since stabilized and is transferred to the floor for management of acute hypoxic respiratory failure and colitis.      #Acute hypoxic respiratory failure 2/2 sepsis and possible COPD exacerbation   #pleural effusions R>L  ::In ICU: Patient had episode of significant hypoxia and agitation 10/10 with SpO2 in 80s during ICU admission, increased O2 requirement to 10L NC due to tachypnea, wheezing, and tachycardia  ::since stabilized and is HDS on 4L NC 10/12  ::Repeat CT PE 10/10 No acute PE. Worseneing interstitial edema with ground glass opacities, and bilateral pleural effusions with right worse than left. Posterior basilar consolidation right worse than left  -Weaning from IV solumedrol to oral prednisone 50mg x3 days on 10/12-10/14, prednisone 30mg for 2 days on 10/15-10/16  -Continue home spironolactone 50mg and bumex 2mg for diuresis of pulmonary edema  -Continue to wean O2 to maintain SpO2 > 88%  -Continue home albuterol inhalers PRN, Breo Ellipta scheduled, montelukast 10 mg.   -Encourage bronchial hygiene and incentive spirometry use  -Weaned to RA on 10/14     #HTN/CHF   ::Echo 10/9 showed: Normal LV systolic function 60-65% EF. LV diastolic filling indeterminate. Aortic valve sclerosis  - Resumed home HTN and CHF meds: Carvedilol 25mg BID, Amlodipine 10mg, spironolactone 50mg, and bumex 2mg on 10/11  - Full dose of home lisinopril 40mg resumed      #Chronic diarrhea  #Colitis with potential ileus  ::CT Abd/Pelvis 10/8: Moderate generalized dilation of small bowel containing gas and fluid. Mild generalized dilation of of the colon also containing fluid and gas. Mild wall thickening of distal transverse colon. May reflect ileus and nonspecific infectious or inflammatory enterocolitis  :: C. Diff negative.   :: Stool PCR negative  :: s/p rectal tube removal 10/11  :: Discontinued ceftriaxone and metronidazole (10/7 - 10/7)  :: Due to concern for C. Diff, patient was started on PO vancomycin, PO vanc discontinued due to C. Diff being negative  :: Started IV vancomycin (10/7-10/9) and Zosyn (10/7 - )  :: Urine culture negative  :: Blood cultures from 10/6 NG  - ID consulted: Recommend discontinuing vanc as skin changes more likely a result of venous stasis vs cellulitis. Continue Zosyn until all cultures come back  - Patient continues to tolerate regular diet well without worsening abdominal pain or nausea  - will monitor  - consider imodium if infectious enterocolitis ruled out  - Zosyn discontinued on 10/13 (completed 7 day course for colitis)  - Loose stool output continues, unclear if infectious colitis vs. Reaction to prolonged antibiotic therapy. Repeat c diff testing pending.  - If C diff testing negative, will start imodium BID     #Venous tibial ulcer  - Wound care consulted, appreciate recs     Chronic Medical Issues:  #HLD/ CVA  - Continue home ASA 81 mg and atorvastatin 40 mg daily      #Chronic alcoholism   - CIWA protocol discontinued, as patient has not required ativan since 10/8  - Continuing folic acid and multivitamin     #Parkinsonism:  - Patient experiences occasional tremors, however the patient is not on any home regimen for Parkinson's disease.   - Will monitor.      #Elevated lipase, concern for acute pancreatitis  ::Lipase level 155 on admission  ::CT abdomen/pelvis did not show concerning signs of pancreatitis  - Fecal elastase pending      #T2DM  - Mild SSI         Fluids: PO PRN IV  Electrolytes: Replete as needed.  Nutrition: Adult diet 2-3g Na  GI prophylaxis: None  VTE prophylaxis: Lovenox  Abx: Ceftriaxone and metronidazole (10/7 - 10/7). PO vancomycin (10/7 - 10/7). IV vancomycin (10/7-10/9).  Zosyn (10/7 - )  Code Status: DNR     Disposition: Patient on zosyn and 2L NC, pending potential discharge to home <48 hours           Principal Problem:    Septic shock (CMS/HCC)  Active Problems:    CHF (congestive heart failure) (CMS/HCC)         Debra Miller MD

## 2023-10-14 NOTE — PROGRESS NOTES
Keisha Santa is a 62 y.o. female on day 7 of admission presenting with Septic shock (CMS/HCC).    Subjective   Interval History: no fever, no new complaints         Review of Systems    Objective   Range of Vitals (last 24 hours)  Heart Rate:  [63-78]   Temp:  [35.9 °C (96.6 °F)-36.5 °C (97.7 °F)]   Resp:  [18]   BP: (126-164)/(64-99)   Weight:  [108 kg (237 lb 14 oz)]   SpO2:  [93 %-96 %]   Daily Weight  10/14/23 : 108 kg (237 lb 14 oz)    Body mass index is 42.15 kg/m².    Physical Exam  Constitutional:       Appearance: Normal appearance.   HENT:      Head: Normocephalic and atraumatic.      Mouth/Throat:      Mouth: Mucous membranes are moist.      Pharynx: Oropharynx is clear.   Eyes:      Pupils: Pupils are equal, round, and reactive to light.   Cardiovascular:      Rate and Rhythm: Normal rate and regular rhythm.      Heart sounds: Normal heart sounds.   Pulmonary:      Effort: Pulmonary effort is normal.      Breath sounds: Normal breath sounds.   Abdominal:      General: Abdomen is flat. Bowel sounds are normal.      Palpations: Abdomen is soft.   Musculoskeletal:      Cervical back: Normal range of motion.   Neurological:      Mental Status: She is alert.           Antibiotics  heparin (porcine) injection 7,500 Units  cefTRIAXone (Rocephin) IVPB 1 g  metroNIDAZOLE in NaCl (iso-os) (Flagyl)  mg  norepinephrine (Levophed) 8 mg in dextrose 5% 250 mL (0.032 mg/mL) infusion (premix)  flu vaccine (IIV4) age 6 months and greater, preservative free  lactated Ringer's bolus 1,000 mL  dilTIAZem (Cardizem CD) 24 hr capsule        amLODIPine (Norvasc) tablet  aspirin EC tablet  atorvastatin (Lipitor) tablet  bumetanide (Bumex) tablet  carvedilol (Coreg) tablet                montelukast (Singulair) tablet    albuterol 90 mcg/actuation inhaler 2 puff  albuterol 2.5 mg /3 mL (0.083 %) nebulizer solution 2.5 mg  aspirin EC tablet 81 mg  atorvastatin (Lipitor) tablet 40 mg  diclofenac sodium (Voltaren) 1 % gel  1 Application  EPINEPHrine (Epipen) injection syringe 0.3 mg  fluticasone (Flonase) nasal spray 2 spray  fluticasone furoate-vilanteroL (Breo Ellipta) 200-25 mcg/dose inhaler 1 puff  montelukast (Singulair) tablet 10 mg  dextrose 50 % injection 25 g  glucagon (Glucagen) injection 1 mg  dextrose 10 % in water (D10W) infusion  insulin lispro (HumaLOG) injection 0-5 Units  acetaminophen (Tylenol) tablet 650 mg  folic acid (Folvite) tablet 1 mg  multivitamin with minerals 1 tablet  LORazepam (Ativan) injection 0.5 mg  LORazepam (Ativan) injection 1 mg  LORazepam (Ativan) injection 2 mg  nystatin (Mycostatin) cream  vancomycin (Vancocin) capsule 125 mg  piperacillin-tazobactam-dextrose (Zosyn) IV 4.5 g  sodium chloride 0.9 % bolus 500 mL  vancomycin (Vancocin) 2,000 mg in dextrose 5 % in water (D5W) 500 mL IV  vancomycin in dextrose 5 % (Vancocin) IVPB 750 mg  atorvastatin (Lipitor) tablet 40 mg  diclofenac sodium (Voltaren) 1 % gel 1 Application  magnesium sulfate IV 2 g  sodium chloride 0.9 % bolus 1,000 mL  perflutren lipid microspheres (Definity) injection 0.5 mL  sulfur hexafluoride microsphr (Lumason) injection 24.28 mg  perflutren protein A microsphere (Optison) injection 0.5 mL  insulin lispro (HumaLOG) injection 0-10 Units  sodium chloride 0.9 % bolus 1,000 mL  HYDROmorphone (Dilaudid) injection 0.2 mg  gabapentin (Neurontin) capsule 300 mg  cyclobenzaprine (Flexeril) tablet 5 mg  sodium chloride 0.9 % bolus 1,000 mL  albuterol 2.5 mg /3 mL (0.083 %) nebulizer solution 2.5 mg  sodium chloride 0.9 % bolus 1,000 mL  sodium chloride 0.9 % bolus 1,000 mL  sodium chloride 0.9 % bolus 1,000 mL  dextrose 5 % in water (D5W) infusion  potassium phosphates 15 mmol in dextrose 5 % in water (D5W) 250 mL IV  vancomycin in dextrose 5 % (Vancocin) IVPB 1,000 mg  lactated Ringer's bolus 1,000 mL  sodium chloride 0.45 % infusion  oxyCODONE-acetaminophen (Percocet) 5-325 mg per tablet 1 tablet  oxyCODONE (Roxicodone) immediate  release tablet 10 mg  lactated Ringer's bolus 1,000 mL  lactated Ringer's bolus 1,000 mL  zinc oxide 20 % ointment 1 Application  gabapentin (Neurontin) capsule 400 mg  vancomycin (Vancocin) in dextrose 5% 250 mL IV 1,250 mg  piperacillin-tazobactam-dextrose (Zosyn) IV 4.5 g  atorvastatin (Lipitor) tablet 40 mg  magnesium sulfate IV 2 g  potassium phosphates 21 mmol in dextrose 5 % in water (D5W) 250 mL IV  enoxaparin (Lovenox) syringe 40 mg  ondansetron ODT (Zofran-ODT) disintegrating tablet 4 mg  ondansetron (Zofran) injection 4 mg  methylPREDNISolone sod succinate (PF) (SOLU-Medrol) 40 mg/mL injection  - Omnicell Override Pull  methylPREDNISolone sod succinate (PF) (SOLU-Medrol) injection 60 mg  oxygen (O2) therapy  lidocaine (Xylocaine) 10 mg/mL (1 %) injection 50 mg  sodium chloride 0.9% flush 10 mL  sodium chloride 0.9% flush 10 mL  iohexol (OMNIPaque) 350 mg iodine/mL injection 66 mL  traZODone (Desyrel) tablet 150 mg  amLODIPine (Norvasc) tablet 10 mg  carvedilol (Coreg) tablet 25 mg  spironolactone (Aldactone) tablet 50 mg  albuterol 2.5 mg /3 mL (0.083 %) nebulizer solution 2.5 mg  albuterol 2.5 mg /3 mL (0.083 %) nebulizer solution 2.5 mg  bumetanide (Bumex) tablet 2 mg  predniSONE (Deltasone) tablet 50 mg  acetaminophen (Tylenol) tablet 650 mg  acetaminophen (Tylenol) tablet 975 mg  amLODIPine (Norvasc) tablet 10 mg  carvedilol (Coreg) tablet 25 mg  DULoxetine (Cymbalta) DR capsule 60 mg  lisinopril tablet 10 mg  spironolactone (Aldactone) tablet 50 mg  albuterol 2.5 mg /3 mL (0.083 %) nebulizer solution 2.5 mg  oxygen (O2) therapy  potassium chloride CR (Klor-Con M20) ER tablet 40 mEq  magnesium sulfate IV 2 g  lisinopril tablet 20 mg  lisinopril tablet 10 mg  piperacillin-tazobactam-dextrose (Zosyn) IV 4.5 g      Relevant Results  Labs  Results from last 72 hours   Lab Units 10/14/23  0758 10/13/23  0523 10/12/23  0656   WBC AUTO x10*3/uL 10.6 9.3 10.5   HEMOGLOBIN g/dL 9.8* 9.4* 9.7*   HEMATOCRIT %  "30.5* 29.6* 30.0*   PLATELETS AUTO x10*3/uL 537* 484* 490*     Results from last 72 hours   Lab Units 10/14/23  0758 10/13/23  0523 10/12/23  0656   SODIUM mmol/L 140 140 140   POTASSIUM mmol/L 3.4* 3.4* 4.3   CHLORIDE mmol/L 98 99 101   CO2 mmol/L 32 32 29   BUN mg/dL 15 15 13   CREATININE mg/dL 0.76 0.94 0.81   GLUCOSE mg/dL 107* 114* 113*   CALCIUM mg/dL 8.9 8.7 9.1   ANION GAP mmol/L 13 12 14   EGFR mL/min/1.73m*2 89 69 83   PHOSPHORUS mg/dL 3.5 3.7 3.5     Results from last 72 hours   Lab Units 10/14/23  0758 10/13/23  0523 10/12/23  0656   ALBUMIN g/dL 3.5 3.2* 3.5     Estimated Creatinine Clearance: 91.5 mL/min (by C-G formula based on SCr of 0.76 mg/dL).  No results found for: \"CRP\"  Microbiology  Reviewed   Imaging  Reviewed    Assessment/Plan   Sepsis, the BC is negative, completed antibiotics  Abdominal pain / diarrhea, C. Diff is negative  Respiratory failure / COPD / atelectasis  Legs stasis  Encephalopathy, likely metabolic   Recommendations :  Supportive care  PT     I spent  minutes in the professional and overall care of this patient.      Lizbeth Garcia MD  "

## 2023-10-14 NOTE — PROGRESS NOTES
Occupational Therapy                 Therapy Communication Note    Patient Name: Keisha Santa  MRN: [id number]  Today's Date: 10/14/2023     Discipline: Occupational Therapy    Missed Visit Reason: Missed Visit Reason: Patient refused (Pt pleasantly decline; pt reported not getting any sleep last night and requested to be seen later in the day. Will reattempt to treat.)    Missed Time: Attempt

## 2023-10-14 NOTE — PROGRESS NOTES
"Keisha Santa is a 62 y.o. female on day 7 of admission presenting with Septic shock (CMS/HCC).    Subjective   Feels better.  Now off O2.       Objective     Physical Exam  Vitals reviewed.   Constitutional:       Appearance: She is ill-appearing.   HENT:      Head: Normocephalic and atraumatic.   Eyes:      Extraocular Movements: Extraocular movements intact.   Cardiovascular:      Rate and Rhythm: Normal rate and regular rhythm.      Heart sounds: Normal heart sounds.   Pulmonary:      Effort: Pulmonary effort is normal.      Comments: Coarse breaths sounds bilaterally  Abdominal:      Palpations: Abdomen is soft.      Tenderness: There is no abdominal tenderness.   Musculoskeletal:      Cervical back: Normal range of motion.   Skin:     General: Skin is warm.   Neurological:      General: No focal deficit present.      Mental Status: She is alert and oriented to person, place, and time. Mental status is at baseline.   Psychiatric:         Mood and Affect: Mood normal.         Behavior: Behavior normal.         Last Recorded Vitals  Blood pressure (!) 149/100, pulse 79, temperature 36.5 °C (97.7 °F), temperature source Skin, resp. rate 18, height 1.6 m (5' 2.99\"), weight 108 kg (237 lb 14 oz), SpO2 96 %.  Intake/Output last 3 Shifts:  I/O last 3 completed shifts:  In: 1220 (10.9 mL/kg) [P.O.:720; IV Piggyback:500]  Out: - (0 mL/kg)   Dosing Weight: 112.1 kg     Relevant Results              Results for orders placed or performed during the hospital encounter of 10/07/23 (from the past 24 hour(s))   POCT GLUCOSE   Result Value Ref Range    POCT Glucose 135 (H) 74 - 99 mg/dL   POCT GLUCOSE   Result Value Ref Range    POCT Glucose 112 (H) 74 - 99 mg/dL   CBC   Result Value Ref Range    WBC 10.6 4.4 - 11.3 x10*3/uL    nRBC 0.3 (H) 0.0 - 0.0 /100 WBCs    RBC 3.19 (L) 4.00 - 5.20 x10*6/uL    Hemoglobin 9.8 (L) 12.0 - 16.0 g/dL    Hematocrit 30.5 (L) 36.0 - 46.0 %    MCV 96 80 - 100 fL    MCH 30.7 26.0 - 34.0 pg    " MCHC 32.1 32.0 - 36.0 g/dL    RDW 15.3 (H) 11.5 - 14.5 %    Platelets 537 (H) 150 - 450 x10*3/uL    MPV 8.4 7.5 - 11.5 fL   Renal Function Panel   Result Value Ref Range    Glucose 107 (H) 74 - 99 mg/dL    Sodium 140 136 - 145 mmol/L    Potassium 3.4 (L) 3.5 - 5.3 mmol/L    Chloride 98 98 - 107 mmol/L    Bicarbonate 32 21 - 32 mmol/L    Anion Gap 13 10 - 20 mmol/L    Urea Nitrogen 15 6 - 23 mg/dL    Creatinine 0.76 0.50 - 1.05 mg/dL    eGFR 89 >60 mL/min/1.73m*2    Calcium 8.9 8.6 - 10.3 mg/dL    Phosphorus 3.5 2.5 - 4.9 mg/dL    Albumin 3.5 3.4 - 5.0 g/dL   Magnesium   Result Value Ref Range    Magnesium 1.89 1.60 - 2.40 mg/dL   POCT GLUCOSE   Result Value Ref Range    POCT Glucose 156 (H) 74 - 99 mg/dL   POCT GLUCOSE   Result Value Ref Range    POCT Glucose 171 (H) 74 - 99 mg/dL     Scheduled medications  albuterol, 2.5 mg, nebulization, TID  amLODIPine, 10 mg, oral, Daily  aspirin, 81 mg, oral, Daily  atorvastatin, 40 mg, oral, Daily  bumetanide, 2 mg, oral, Daily  carvedilol, 25 mg, oral, BID with meals  DULoxetine, 60 mg, oral, BID  enoxaparin, 40 mg, subcutaneous, q12h STEVE  influenza, 0.5 mL, intramuscular, During hospitalization  fluticasone furoate-vilanteroL, 1 puff, inhalation, Daily  folic acid, 1 mg, oral, Daily  gabapentin, 400 mg, oral, BID  insulin lispro, 0-10 Units, subcutaneous, TID with meals  lidocaine, 5 mL, infiltration, Once  [START ON 10/15/2023] lisinopril, 40 mg, oral, Daily  montelukast, 10 mg, oral, Daily  multivitamin with minerals, 1 tablet, oral, Daily  nystatin, , Topical, BID  [START ON 10/15/2023] predniSONE, 30 mg, oral, Daily  sodium chloride 0.9%, 10 mL, intra-catheter, q12h  spironolactone, 50 mg, oral, Daily  traZODone, 150 mg, oral, Nightly      Continuous medications     PRN medications  PRN medications: acetaminophen, acetaminophen, [MAR Hold] albuterol, albuterol, dextrose 10 % in water (D10W), dextrose, diclofenac sodium, EPINEPHrine, fluticasone, glucagon, ondansetron ODT  **OR** ondansetron, sodium chloride 0.9%, white petrolatum, zinc oxide                   Assessment/Plan   Principal Problem:    Septic shock (CMS/[facility])  Active Problems:    CHF (congestive heart failure) (CMS/[facility])    61 yo woman w/ h/o HTN, DM, MUKESH admitted w/ colitis     Acute hypoxic resp failure 2/2 CHF and COPD exacerbation - cont diuresis.  Resolved now on RA.  Cont incentive spirometry.     COPD exacerbation - cont prednisone 40mg daily for 5 day total course.  Cont nebs and inhalers.    Will sign off at this time.  Please reconsult if there are any further questions.         Terrance Morrissey MD

## 2023-10-15 LAB
ALBUMIN SERPL BCP-MCNC: 3.4 G/DL (ref 3.4–5)
ANION GAP SERPL CALC-SCNC: 14 MMOL/L (ref 10–20)
BUN SERPL-MCNC: 15 MG/DL (ref 6–23)
C DIF TOX TCDA+TCDB STL QL NAA+PROBE: NOT DETECTED
CALCIUM SERPL-MCNC: 8.7 MG/DL (ref 8.6–10.3)
CHLORIDE SERPL-SCNC: 97 MMOL/L (ref 98–107)
CO2 SERPL-SCNC: 29 MMOL/L (ref 21–32)
CREAT SERPL-MCNC: 0.74 MG/DL (ref 0.5–1.05)
ERYTHROCYTE [DISTWIDTH] IN BLOOD BY AUTOMATED COUNT: 15.6 % (ref 11.5–14.5)
GFR SERPL CREATININE-BSD FRML MDRD: >90 ML/MIN/1.73M*2
GLUCOSE BLD MANUAL STRIP-MCNC: 132 MG/DL (ref 74–99)
GLUCOSE BLD MANUAL STRIP-MCNC: 181 MG/DL (ref 74–99)
GLUCOSE BLD MANUAL STRIP-MCNC: 195 MG/DL (ref 74–99)
GLUCOSE BLD MANUAL STRIP-MCNC: 88 MG/DL (ref 74–99)
GLUCOSE SERPL-MCNC: 88 MG/DL (ref 74–99)
HCT VFR BLD AUTO: 30.7 % (ref 36–46)
HGB BLD-MCNC: 9.9 G/DL (ref 12–16)
MAGNESIUM SERPL-MCNC: 1.78 MG/DL (ref 1.6–2.4)
MCH RBC QN AUTO: 30.8 PG (ref 26–34)
MCHC RBC AUTO-ENTMCNC: 32.2 G/DL (ref 32–36)
MCV RBC AUTO: 96 FL (ref 80–100)
NRBC BLD-RTO: 0.2 /100 WBCS (ref 0–0)
PHOSPHATE SERPL-MCNC: 3.4 MG/DL (ref 2.5–4.9)
PLATELET # BLD AUTO: 559 X10*3/UL (ref 150–450)
PMV BLD AUTO: 8.4 FL (ref 7.5–11.5)
POTASSIUM SERPL-SCNC: 3.8 MMOL/L (ref 3.5–5.3)
RBC # BLD AUTO: 3.21 X10*6/UL (ref 4–5.2)
SODIUM SERPL-SCNC: 136 MMOL/L (ref 136–145)
WBC # BLD AUTO: 13.1 X10*3/UL (ref 4.4–11.3)

## 2023-10-15 PROCEDURE — 2500000001 HC RX 250 WO HCPCS SELF ADMINISTERED DRUGS (ALT 637 FOR MEDICARE OP)

## 2023-10-15 PROCEDURE — 80069 RENAL FUNCTION PANEL: CPT

## 2023-10-15 PROCEDURE — 96372 THER/PROPH/DIAG INJ SC/IM: CPT

## 2023-10-15 PROCEDURE — 2500000001 HC RX 250 WO HCPCS SELF ADMINISTERED DRUGS (ALT 637 FOR MEDICARE OP): Performed by: STUDENT IN AN ORGANIZED HEALTH CARE EDUCATION/TRAINING PROGRAM

## 2023-10-15 PROCEDURE — 99232 SBSQ HOSP IP/OBS MODERATE 35: CPT | Performed by: STUDENT IN AN ORGANIZED HEALTH CARE EDUCATION/TRAINING PROGRAM

## 2023-10-15 PROCEDURE — 2500000002 HC RX 250 W HCPCS SELF ADMINISTERED DRUGS (ALT 637 FOR MEDICARE OP, ALT 636 FOR OP/ED)

## 2023-10-15 PROCEDURE — 83735 ASSAY OF MAGNESIUM: CPT

## 2023-10-15 PROCEDURE — 82947 ASSAY GLUCOSE BLOOD QUANT: CPT

## 2023-10-15 PROCEDURE — 2500000004 HC RX 250 GENERAL PHARMACY W/ HCPCS (ALT 636 FOR OP/ED)

## 2023-10-15 PROCEDURE — 85027 COMPLETE CBC AUTOMATED: CPT

## 2023-10-15 PROCEDURE — 1100000001 HC PRIVATE ROOM DAILY

## 2023-10-15 RX ORDER — LOPERAMIDE HYDROCHLORIDE 2 MG/1
2 CAPSULE ORAL 2 TIMES DAILY
Status: DISCONTINUED | OUTPATIENT
Start: 2023-10-15 | End: 2023-10-16 | Stop reason: HOSPADM

## 2023-10-15 RX ORDER — LOPERAMIDE HYDROCHLORIDE 2 MG/1
2 CAPSULE ORAL 2 TIMES DAILY
Status: DISCONTINUED | OUTPATIENT
Start: 2023-10-15 | End: 2023-10-15

## 2023-10-15 RX ADMIN — FOLIC ACID 1 MG: 1 TABLET ORAL at 10:47

## 2023-10-15 RX ADMIN — NYSTATIN: 100000 CREAM TOPICAL at 10:45

## 2023-10-15 RX ADMIN — GABAPENTIN 400 MG: 400 CAPSULE ORAL at 20:45

## 2023-10-15 RX ADMIN — RUGBY ZINC OXIDE 20% 1 APPLICATION: 20 OINTMENT TOPICAL at 21:18

## 2023-10-15 RX ADMIN — CARVEDILOL 25 MG: 25 TABLET, FILM COATED ORAL at 17:27

## 2023-10-15 RX ADMIN — ASPIRIN 81 MG: 81 TABLET, COATED ORAL at 10:45

## 2023-10-15 RX ADMIN — BUMETANIDE 2 MG: 1 TABLET ORAL at 10:46

## 2023-10-15 RX ADMIN — FLUTICASONE FUROATE AND VILANTEROL TRIFENATATE 1 PUFF: 200; 25 POWDER RESPIRATORY (INHALATION) at 10:45

## 2023-10-15 RX ADMIN — TRAZODONE HYDROCHLORIDE 150 MG: 50 TABLET ORAL at 20:45

## 2023-10-15 RX ADMIN — ENOXAPARIN SODIUM 40 MG: 40 INJECTION SUBCUTANEOUS at 10:45

## 2023-10-15 RX ADMIN — ATORVASTATIN CALCIUM 40 MG: 40 TABLET, FILM COATED ORAL at 20:46

## 2023-10-15 RX ADMIN — PREDNISONE 30 MG: 20 TABLET ORAL at 10:45

## 2023-10-15 RX ADMIN — MONTELUKAST 10 MG: 10 TABLET, FILM COATED ORAL at 10:46

## 2023-10-15 RX ADMIN — MULTIPLE VITAMINS W/ MINERALS TAB 1 TABLET: TAB at 10:46

## 2023-10-15 RX ADMIN — ACETAMINOPHEN 975 MG: 325 TABLET ORAL at 20:56

## 2023-10-15 RX ADMIN — AMLODIPINE BESYLATE 10 MG: 10 TABLET ORAL at 10:46

## 2023-10-15 RX ADMIN — GABAPENTIN 400 MG: 400 CAPSULE ORAL at 10:48

## 2023-10-15 RX ADMIN — ENOXAPARIN SODIUM 40 MG: 40 INJECTION SUBCUTANEOUS at 20:45

## 2023-10-15 RX ADMIN — Medication 10 ML: at 12:30

## 2023-10-15 RX ADMIN — SPIRONOLACTONE 50 MG: 25 TABLET ORAL at 10:45

## 2023-10-15 RX ADMIN — DULOXETINE HYDROCHLORIDE 60 MG: 60 CAPSULE, DELAYED RELEASE ORAL at 10:46

## 2023-10-15 RX ADMIN — DULOXETINE HYDROCHLORIDE 60 MG: 60 CAPSULE, DELAYED RELEASE ORAL at 20:45

## 2023-10-15 RX ADMIN — LOPERAMIDE HYDROCHLORIDE 2 MG: 2 CAPSULE ORAL at 16:41

## 2023-10-15 RX ADMIN — CARVEDILOL 25 MG: 25 TABLET, FILM COATED ORAL at 10:46

## 2023-10-15 RX ADMIN — WHITE PETROLATUM 1 APPLICATION: 1.75 OINTMENT TOPICAL at 10:45

## 2023-10-15 RX ADMIN — LISINOPRIL 40 MG: 20 TABLET ORAL at 10:45

## 2023-10-15 RX ADMIN — INSULIN LISPRO 2 UNITS: 100 INJECTION, SOLUTION INTRAVENOUS; SUBCUTANEOUS at 17:27

## 2023-10-15 ASSESSMENT — COGNITIVE AND FUNCTIONAL STATUS - GENERAL
DAILY ACTIVITIY SCORE: 23
CLIMB 3 TO 5 STEPS WITH RAILING: A LITTLE
MOBILITY SCORE: 23
DRESSING REGULAR LOWER BODY CLOTHING: A LITTLE

## 2023-10-15 NOTE — PROGRESS NOTES
Keisha Santa is a 62 y.o. female on day 8 of admission presenting with Septic shock (CMS/HCC).    Subjective   Interval History: no fever, no new complaints        Review of Systems    Objective   Range of Vitals (last 24 hours)  Heart Rate:  [72-79]   Temp:  [36.1 °C (97 °F)]   Resp:  [18]   BP: (140-149)/()   Weight:  [109 kg (241 lb 2.9 oz)]   SpO2:  [95 %]   Daily Weight  10/15/23 : 109 kg (241 lb 2.9 oz)    Body mass index is 42.73 kg/m².    Physical Exam  Constitutional:       Appearance: Normal appearance.   HENT:      Head: Normocephalic and atraumatic.      Mouth/Throat:      Mouth: Mucous membranes are moist.      Pharynx: Oropharynx is clear.   Eyes:      Pupils: Pupils are equal, round, and reactive to light.   Cardiovascular:      Rate and Rhythm: Normal rate and regular rhythm.      Heart sounds: Normal heart sounds.   Pulmonary:      Effort: Pulmonary effort is normal.      Breath sounds: Normal breath sounds.   Abdominal:      General: Abdomen is flat. Bowel sounds are normal.      Palpations: Abdomen is soft.   Musculoskeletal:      Cervical back: Normal range of motion.   Neurological:      Mental Status: She is alert.           Antibiotics  heparin (porcine) injection 7,500 Units  cefTRIAXone (Rocephin) IVPB 1 g  metroNIDAZOLE in NaCl (iso-os) (Flagyl)  mg  norepinephrine (Levophed) 8 mg in dextrose 5% 250 mL (0.032 mg/mL) infusion (premix)  flu vaccine (IIV4) age 6 months and greater, preservative free  lactated Ringer's bolus 1,000 mL  dilTIAZem (Cardizem CD) 24 hr capsule        amLODIPine (Norvasc) tablet  aspirin EC tablet  atorvastatin (Lipitor) tablet  bumetanide (Bumex) tablet  carvedilol (Coreg) tablet                montelukast (Singulair) tablet    albuterol 90 mcg/actuation inhaler 2 puff  albuterol 2.5 mg /3 mL (0.083 %) nebulizer solution 2.5 mg  aspirin EC tablet 81 mg  atorvastatin (Lipitor) tablet 40 mg  diclofenac sodium (Voltaren) 1 % gel 1  Application  EPINEPHrine (Epipen) injection syringe 0.3 mg  fluticasone (Flonase) nasal spray 2 spray  fluticasone furoate-vilanteroL (Breo Ellipta) 200-25 mcg/dose inhaler 1 puff  montelukast (Singulair) tablet 10 mg  dextrose 50 % injection 25 g  glucagon (Glucagen) injection 1 mg  dextrose 10 % in water (D10W) infusion  insulin lispro (HumaLOG) injection 0-5 Units  acetaminophen (Tylenol) tablet 650 mg  folic acid (Folvite) tablet 1 mg  multivitamin with minerals 1 tablet  LORazepam (Ativan) injection 0.5 mg  LORazepam (Ativan) injection 1 mg  LORazepam (Ativan) injection 2 mg  nystatin (Mycostatin) cream  vancomycin (Vancocin) capsule 125 mg  piperacillin-tazobactam-dextrose (Zosyn) IV 4.5 g  sodium chloride 0.9 % bolus 500 mL  vancomycin (Vancocin) 2,000 mg in dextrose 5 % in water (D5W) 500 mL IV  vancomycin in dextrose 5 % (Vancocin) IVPB 750 mg  atorvastatin (Lipitor) tablet 40 mg  diclofenac sodium (Voltaren) 1 % gel 1 Application  magnesium sulfate IV 2 g  sodium chloride 0.9 % bolus 1,000 mL  perflutren lipid microspheres (Definity) injection 0.5 mL  sulfur hexafluoride microsphr (Lumason) injection 24.28 mg  perflutren protein A microsphere (Optison) injection 0.5 mL  insulin lispro (HumaLOG) injection 0-10 Units  sodium chloride 0.9 % bolus 1,000 mL  HYDROmorphone (Dilaudid) injection 0.2 mg  gabapentin (Neurontin) capsule 300 mg  cyclobenzaprine (Flexeril) tablet 5 mg  sodium chloride 0.9 % bolus 1,000 mL  albuterol 2.5 mg /3 mL (0.083 %) nebulizer solution 2.5 mg  sodium chloride 0.9 % bolus 1,000 mL  sodium chloride 0.9 % bolus 1,000 mL  sodium chloride 0.9 % bolus 1,000 mL  dextrose 5 % in water (D5W) infusion  potassium phosphates 15 mmol in dextrose 5 % in water (D5W) 250 mL IV  vancomycin in dextrose 5 % (Vancocin) IVPB 1,000 mg  lactated Ringer's bolus 1,000 mL  sodium chloride 0.45 % infusion  oxyCODONE-acetaminophen (Percocet) 5-325 mg per tablet 1 tablet  oxyCODONE (Roxicodone) immediate  release tablet 10 mg  lactated Ringer's bolus 1,000 mL  lactated Ringer's bolus 1,000 mL  zinc oxide 20 % ointment 1 Application  gabapentin (Neurontin) capsule 400 mg  vancomycin (Vancocin) in dextrose 5% 250 mL IV 1,250 mg  piperacillin-tazobactam-dextrose (Zosyn) IV 4.5 g  atorvastatin (Lipitor) tablet 40 mg  magnesium sulfate IV 2 g  potassium phosphates 21 mmol in dextrose 5 % in water (D5W) 250 mL IV  enoxaparin (Lovenox) syringe 40 mg  ondansetron ODT (Zofran-ODT) disintegrating tablet 4 mg  ondansetron (Zofran) injection 4 mg  methylPREDNISolone sod succinate (PF) (SOLU-Medrol) 40 mg/mL injection  - Omnicell Override Pull  methylPREDNISolone sod succinate (PF) (SOLU-Medrol) injection 60 mg  oxygen (O2) therapy  lidocaine (Xylocaine) 10 mg/mL (1 %) injection 50 mg  sodium chloride 0.9% flush 10 mL  sodium chloride 0.9% flush 10 mL  iohexol (OMNIPaque) 350 mg iodine/mL injection 66 mL  traZODone (Desyrel) tablet 150 mg  amLODIPine (Norvasc) tablet 10 mg  carvedilol (Coreg) tablet 25 mg  spironolactone (Aldactone) tablet 50 mg  albuterol 2.5 mg /3 mL (0.083 %) nebulizer solution 2.5 mg  albuterol 2.5 mg /3 mL (0.083 %) nebulizer solution 2.5 mg  bumetanide (Bumex) tablet 2 mg  predniSONE (Deltasone) tablet 50 mg  acetaminophen (Tylenol) tablet 650 mg  acetaminophen (Tylenol) tablet 975 mg  amLODIPine (Norvasc) tablet 10 mg  carvedilol (Coreg) tablet 25 mg  DULoxetine (Cymbalta) DR capsule 60 mg  lisinopril tablet 10 mg  spironolactone (Aldactone) tablet 50 mg  albuterol 2.5 mg /3 mL (0.083 %) nebulizer solution 2.5 mg  oxygen (O2) therapy  potassium chloride CR (Klor-Con M20) ER tablet 40 mEq  magnesium sulfate IV 2 g  lisinopril tablet 20 mg  lisinopril tablet 10 mg  piperacillin-tazobactam-dextrose (Zosyn) IV 4.5 g  zinc oxide (Boudreauxs Butt Paste) 16 % ointment  potassium chloride CR (Klor-Con M20) ER tablet 40 mEq  predniSONE (Deltasone) tablet 30 mg  white petrolatum (Aquaphor) 41 % ointment 1  "Application  albuterol 2.5 mg /3 mL (0.083 %) nebulizer solution 2.5 mg  lisinopril tablet 40 mg      Relevant Results  Labs  Results from last 72 hours   Lab Units 10/15/23  0752 10/14/23  0758 10/13/23  0523   WBC AUTO x10*3/uL 13.1* 10.6 9.3   HEMOGLOBIN g/dL 9.9* 9.8* 9.4*   HEMATOCRIT % 30.7* 30.5* 29.6*   PLATELETS AUTO x10*3/uL 559* 537* 484*     Results from last 72 hours   Lab Units 10/15/23  0752 10/14/23  0758 10/13/23  0523   SODIUM mmol/L 136 140 140   POTASSIUM mmol/L 3.8 3.4* 3.4*   CHLORIDE mmol/L 97* 98 99   CO2 mmol/L 29 32 32   BUN mg/dL 15 15 15   CREATININE mg/dL 0.74 0.76 0.94   GLUCOSE mg/dL 88 107* 114*   CALCIUM mg/dL 8.7 8.9 8.7   ANION GAP mmol/L 14 13 12   EGFR mL/min/1.73m*2 >90 89 69   PHOSPHORUS mg/dL 3.4 3.5 3.7     Results from last 72 hours   Lab Units 10/15/23  0752 10/14/23  0758 10/13/23  0523   ALBUMIN g/dL 3.4 3.5 3.2*     Estimated Creatinine Clearance: 94.5 mL/min (by C-G formula based on SCr of 0.74 mg/dL).  No results found for: \"CRP\"  Microbiology  Reviewed  Imaging  reviewed      Assessment/Plan   Sepsis, the BC is negative, completed antibiotics  Abdominal pain / diarrhea, C. Diff is negative  Respiratory failure / COPD / atelectasis  Legs stasis  Encephalopathy, likely metabolic   Recommendations :  Supportive care  Discussed with the medical team     I spent  minutes in the professional and overall care of this patient.      Lizbeth Garcia MD  "

## 2023-10-15 NOTE — PROGRESS NOTES
Keisha Santa is a 62 y.o. female on day 8 of admission presenting with Septic shock (CMS/HCC).      Subjective   Patient awake and alert. She continue to endorse diarrhea.        Objective     Last Recorded Vitals  /82   Pulse 72   Temp 36.1 °C (97 °F) (Temporal)   Resp 18   Wt 109 kg (241 lb 2.9 oz)   SpO2 95%   Intake/Output last 3 Shifts:    Intake/Output Summary (Last 24 hours) at 10/15/2023 1436  Last data filed at 10/14/2023 2353  Gross per 24 hour   Intake 10 ml   Output --   Net 10 ml       Admission Weight  Weight: 109 kg (241 lb 2.9 oz) (Simultaneous filing. User may not have seen previous data.) (10/07/23 0430)    Daily Weight  10/15/23 : 109 kg (241 lb 2.9 oz)    Image Results  CT angio chest for pulmonary embolism  Narrative: Interpreted By:  Felix Valdovinos,   STUDY:  CT ANGIO CHEST FOR PULMONARY EMBOLISM;  10/10/2023 12:59 pm      INDICATION:  Signs/Symptoms:Acute hypoxia and tachycardia.      COMPARISON:  10/06/2023      ACCESSION NUMBER(S):  [id number]      ORDERING CLINICIAN:  VANDANA HAMMOND      TECHNIQUE:  Helical data acquisition of the chest was obtained after IV injection  of  66 ml of  Omnipaque 350. Images were reformatted in axial,  coronal, and sagittal planes. 3D reconstructed MIPS volumetric images  were also generated at an independent workstation and reviewed.      FINDINGS:  POTENTIAL LIMITATIONS OF THE STUDY: None      HEART AND VESSELS:  No discrete filling defects within the main pulmonary artery or its  branches.      The thoracic aorta is of normal course and caliber without aneurysmor  dissection. There is mild atherosclerotic calcification greatest at  the arch.      There is mild  coronary artery atherosclerotic calcification.      The cardiac chambers are not enlarged.      MEDIASTINUM AND DIOGENES, LOWER NECK AND AXILLA:  The visualized thyroid gland is within normal limits, the esophagus  appears unremarkable.      There is mild soft tissue fullness at the diogenes  bilaterally, developed  since the prior exam. This is probably due to lymphoid hyperplasia,  most likely reactive, and exacerbated by congestion.      LUNGS AND AIRWAYS:      There is inter lobar septal thickening greater at the upper lobes  since the previous examination indicating interstitial edema. There  are also scattered ground-glass opacities, also probably due to  congestion, although an area of pneumonia is possible. There is  consolidation of the majority of the right lower lobe including the  posterior basal segment and superior segments since the previous  exam. This may be due to compressive atelectasis and/or consolidating  pneumonia. There is mild compressive atelectasis at the left base  posteriorly.      UPPER ABDOMEN AND OTHER FINDINGS:  Development of ascites since the prior exam. There is also  subcutaneous edema right laterally at the upper abdominal wall. Small  hiatal hernia.      CHEST WALL AND OSSEOUS STRUCTURES:  There are no suspicious osseous lesions.      Impression: 1.  No evidence of pulmonary embolus, aneurysm or dissection.  2.  Interstitial edema including with ground-glass opacities, and  bilateral effusions greater on the right. Posterior basilar  consolidation greater on the right.  3. Ascites.      Signed by: Felix Valdovinos 10/10/2023 1:54 PM  Dictation workstation:   [id number]  XR chest 1 view  Narrative: Interpreted By:  Matilde Plummer,   STUDY:  XR CHEST 1 VIEW;  10/10/2023 10:32 am      INDICATION:  Signs/Symptoms:hyopoxia.      COMPARISON:  10/07/2023      ACCESSION NUMBER(S):  [id number]      ORDERING CLINICIAN:  ISIDRO HADDAD      FINDINGS:  The heart is normal in size.      Lung markings appear coarse. There is elevation of the right  hemidiaphragm.      There are atherosclerotic changes of the aorta.      A jugular catheter is present on the right with the tip in the region  of the superior vena cava.      The patient is status post cervical spine fusion.      There are  degenerative changes of the spine.      COMPARISON OF FINDING:  The chest is similar.      Impression: Elevated right hemidiaphragm. Coarse lung markings.      MACRO:  none      Signed by: Matilde Plummer 10/10/2023 10:45 AM  Dictation workstation:   [id number]      Physical Exam  Constitutional:       General: She is not in acute distress.     Appearance: She is not ill-appearing, toxic-appearing or diaphoretic.   HENT:      Head: Normocephalic and atraumatic.      Mouth/Throat:      Mouth: Mucous membranes are moist.   Eyes:      Extraocular Movements: Extraocular movements intact.      Conjunctiva/sclera: Conjunctivae normal.      Pupils: Pupils are equal, round, and reactive to light.   Cardiovascular:      Pulses: Normal pulses.      Heart sounds: Normal heart sounds.   Abdominal:      General: Bowel sounds are normal.      Palpations: Abdomen is soft.   Musculoskeletal:         General: Normal range of motion.      Cervical back: Normal range of motion.      Right lower leg: Edema present.      Left lower leg: Edema present.   Neurological:      General: No focal deficit present.      Mental Status: She is oriented to person, place, and time. Mental status is at baseline.   Psychiatric:         Mood and Affect: Mood normal.         Behavior: Behavior normal.         Thought Content: Thought content normal.         Judgment: Judgment normal.         Relevant Results               Scheduled medications  albuterol, 2.5 mg, nebulization, TID  amLODIPine, 10 mg, oral, Daily  aspirin, 81 mg, oral, Daily  atorvastatin, 40 mg, oral, Daily  bumetanide, 2 mg, oral, Daily  carvedilol, 25 mg, oral, BID with meals  DULoxetine, 60 mg, oral, BID  enoxaparin, 40 mg, subcutaneous, q12h STEVE  influenza, 0.5 mL, intramuscular, During hospitalization  fluticasone furoate-vilanteroL, 1 puff, inhalation, Daily  folic acid, 1 mg, oral, Daily  gabapentin, 400 mg, oral, BID  insulin lispro, 0-10 Units, subcutaneous, TID with  meals  lidocaine, 5 mL, infiltration, Once  lisinopril, 40 mg, oral, Daily  montelukast, 10 mg, oral, Daily  multivitamin with minerals, 1 tablet, oral, Daily  nystatin, , Topical, BID  predniSONE, 30 mg, oral, Daily  sodium chloride 0.9%, 10 mL, intra-catheter, q12h  spironolactone, 50 mg, oral, Daily  traZODone, 150 mg, oral, Nightly      Continuous medications     PRN medications  PRN medications: acetaminophen, acetaminophen, [MAR Hold] albuterol, albuterol, dextrose 10 % in water (D10W), dextrose, diclofenac sodium, EPINEPHrine, fluticasone, glucagon, ondansetron ODT **OR** ondansetron, sodium chloride 0.9%, white petrolatum, zinc oxide  Results for orders placed or performed during the hospital encounter of 10/07/23 (from the past 24 hour(s))   POCT GLUCOSE   Result Value Ref Range    POCT Glucose 171 (H) 74 - 99 mg/dL   POCT GLUCOSE   Result Value Ref Range    POCT Glucose 185 (H) 74 - 99 mg/dL   CBC   Result Value Ref Range    WBC 13.1 (H) 4.4 - 11.3 x10*3/uL    nRBC 0.2 (H) 0.0 - 0.0 /100 WBCs    RBC 3.21 (L) 4.00 - 5.20 x10*6/uL    Hemoglobin 9.9 (L) 12.0 - 16.0 g/dL    Hematocrit 30.7 (L) 36.0 - 46.0 %    MCV 96 80 - 100 fL    MCH 30.8 26.0 - 34.0 pg    MCHC 32.2 32.0 - 36.0 g/dL    RDW 15.6 (H) 11.5 - 14.5 %    Platelets 559 (H) 150 - 450 x10*3/uL    MPV 8.4 7.5 - 11.5 fL   Renal Function Panel   Result Value Ref Range    Glucose 88 74 - 99 mg/dL    Sodium 136 136 - 145 mmol/L    Potassium 3.8 3.5 - 5.3 mmol/L    Chloride 97 (L) 98 - 107 mmol/L    Bicarbonate 29 21 - 32 mmol/L    Anion Gap 14 10 - 20 mmol/L    Urea Nitrogen 15 6 - 23 mg/dL    Creatinine 0.74 0.50 - 1.05 mg/dL    eGFR >90 >60 mL/min/1.73m*2    Calcium 8.7 8.6 - 10.3 mg/dL    Phosphorus 3.4 2.5 - 4.9 mg/dL    Albumin 3.4 3.4 - 5.0 g/dL   Magnesium   Result Value Ref Range    Magnesium 1.78 1.60 - 2.40 mg/dL   POCT GLUCOSE   Result Value Ref Range    POCT Glucose 88 74 - 99 mg/dL   POCT GLUCOSE   Result Value Ref Range    POCT Glucose 132  (H) 74 - 99 mg/dL         Assessment/Plan        Keisha Santa is a 62 y.o. female on day 2 of admission with PMH of HTN, HLD, T2DM, MUKESH non-compliant with CPAP, COPD, Parkinsonism, cervical cancer, 2 CVAs, HFpEF w/ EF 65%, chronic diarrhea, PVD, and chronic alcoholism who was admitted to the ICU 10/9-10/11 for septic shock 2/2 colitis, cellulitis, and possible UTI. She has since stabilized and is transferred to the floor for management of acute hypoxic respiratory failure and colitis.       UPDATE 10/15/23  -C diff negative; started imodium BID          #Acute hypoxic respiratory failure 2/2 sepsis and possible COPD exacerbation   #pleural effusions R>L  ::In ICU: Patient had episode of significant hypoxia and agitation 10/10 with SpO2 in 80s during ICU admission, increased O2 requirement to 10L NC due to tachypnea, wheezing, and tachycardia  ::since stabilized and is HDS on 4L NC 10/12  ::Repeat CT PE 10/10 No acute PE. Worseneing interstitial edema with ground glass opacities, and bilateral pleural effusions with right worse than left. Posterior basilar consolidation right worse than left  -Weaning from IV solumedrol to oral prednisone 50mg x3 days on 10/12-10/14, prednisone 30mg for 2 days on 10/15-10/16  -Continue home spironolactone 50mg and bumex 2mg for diuresis of pulmonary edema  -Continue to wean O2 to maintain SpO2 > 88%  -Continue home albuterol inhalers PRN, Breo Ellipta scheduled, montelukast 10 mg.   -Encourage bronchial hygiene and incentive spirometry use  -Weaned to RA on 10/14     #HTN/CHF   ::Echo 10/9 showed: Normal LV systolic function 60-65% EF. LV diastolic filling indeterminate. Aortic valve sclerosis  - Resumed home HTN and CHF meds: Carvedilol 25mg BID, Amlodipine 10mg, spironolactone 50mg, and bumex 2mg on 10/11  - Full dose of home lisinopril 40mg resumed     #Chronic diarrhea  #Colitis with potential ileus  ::CT Abd/Pelvis 10/8: Moderate generalized dilation of small bowel  containing gas and fluid. Mild generalized dilation of of the colon also containing fluid and gas. Mild wall thickening of distal transverse colon. May reflect ileus and nonspecific infectious or inflammatory enterocolitis  :: C. Diff negative.   :: Stool PCR negative  :: s/p rectal tube removal 10/11  :: Discontinued ceftriaxone and metronidazole (10/7 - 10/7)  :: Due to concern for C. Diff, patient was started on PO vancomycin, PO vanc discontinued due to C. Diff being negative  :: Started IV vancomycin (10/7-10/9) and Zosyn (10/7 - )  :: Urine culture negative  :: Blood cultures from 10/6 NG  - ID consulted: Recommend discontinuing vanc as skin changes more likely a result of venous stasis vs cellulitis. Continue Zosyn until all cultures come back  - Patient continues to tolerate regular diet well without worsening abdominal pain or nausea  - will monitor  - consider imodium if infectious enterocolitis ruled out  - Zosyn discontinued on 10/13 (completed 7 day course for colitis)  - Loose stool output continues, unclear if infectious colitis vs. Reaction to prolonged antibiotic therapy. Repeat c diff testing pending.  - If C diff testing negative, will start imodium BID     #Venous tibial ulcer  - Wound care consulted, appreciate recs     Chronic Medical Issues:  #HLD/ CVA  - Continue home ASA 81 mg and atorvastatin 40 mg daily      #Chronic alcoholism   - CIWA protocol discontinued, as patient has not required ativan since 10/8  - Continuing folic acid and multivitamin     #Parkinsonism:  - Patient experiences occasional tremors, however the patient is not on any home regimen for Parkinson's disease.   - Will monitor.      #Elevated lipase, concern for acute pancreatitis  ::Lipase level 155 on admission  ::CT abdomen/pelvis did not show concerning signs of pancreatitis  - Fecal elastase pending     #T2DM  - Mild SSI         Fluids: PO PRN IV  Electrolytes: Replete as needed.  Nutrition: Adult diet 2-3g Na  GI  prophylaxis: None  VTE prophylaxis: Lovenox  Abx: Ceftriaxone and metronidazole (10/7 - 10/7). PO vancomycin (10/7 - 10/7). IV vancomycin (10/7-10/9).  Zosyn (10/7 - )  Code Status: DNR     Disposition: Patient on zosyn and 2L NC, pending potential discharge to home <48 hours      Brian Jackson MD

## 2023-10-15 NOTE — CARE PLAN
The patient's goals for the shift include Pt will have decreased irritation to skin during shift.    The clinical goals for the shift include Pt will have decreased loose stool during shift

## 2023-10-16 ENCOUNTER — PHARMACY VISIT (OUTPATIENT)
Dept: PHARMACY | Facility: CLINIC | Age: 63
End: 2023-10-16

## 2023-10-16 VITALS
SYSTOLIC BLOOD PRESSURE: 114 MMHG | RESPIRATION RATE: 18 BRPM | TEMPERATURE: 98.1 F | WEIGHT: 239.64 LBS | BODY MASS INDEX: 42.46 KG/M2 | HEART RATE: 70 BPM | HEIGHT: 63 IN | DIASTOLIC BLOOD PRESSURE: 62 MMHG | OXYGEN SATURATION: 93 %

## 2023-10-16 PROBLEM — A41.9 SEPTIC SHOCK (MULTI): Status: RESOLVED | Noted: 2023-10-07 | Resolved: 2023-10-16

## 2023-10-16 PROBLEM — R65.21 SEPTIC SHOCK (MULTI): Status: RESOLVED | Noted: 2023-10-07 | Resolved: 2023-10-16

## 2023-10-16 LAB
ALBUMIN SERPL BCP-MCNC: 3.8 G/DL (ref 3.4–5)
ALP SERPL-CCNC: 61 U/L (ref 33–136)
ALT SERPL W P-5'-P-CCNC: 43 U/L (ref 7–45)
ANION GAP SERPL CALC-SCNC: 14 MMOL/L (ref 10–20)
AST SERPL W P-5'-P-CCNC: 22 U/L (ref 9–39)
BILIRUB SERPL-MCNC: 0.4 MG/DL (ref 0–1.2)
BUN SERPL-MCNC: 17 MG/DL (ref 6–23)
CALCIUM SERPL-MCNC: 9.2 MG/DL (ref 8.6–10.3)
CHLORIDE SERPL-SCNC: 100 MMOL/L (ref 98–107)
CO2 SERPL-SCNC: 30 MMOL/L (ref 21–32)
CREAT SERPL-MCNC: 0.86 MG/DL (ref 0.5–1.05)
ERYTHROCYTE [DISTWIDTH] IN BLOOD BY AUTOMATED COUNT: 15.6 % (ref 11.5–14.5)
GFR SERPL CREATININE-BSD FRML MDRD: 77 ML/MIN/1.73M*2
GLUCOSE BLD MANUAL STRIP-MCNC: 158 MG/DL (ref 74–99)
GLUCOSE BLD MANUAL STRIP-MCNC: 95 MG/DL (ref 74–99)
GLUCOSE SERPL-MCNC: 102 MG/DL (ref 74–99)
HCT VFR BLD AUTO: 33.2 % (ref 36–46)
HGB BLD-MCNC: 10.5 G/DL (ref 12–16)
MAGNESIUM SERPL-MCNC: 1.83 MG/DL (ref 1.6–2.4)
MCH RBC QN AUTO: 30.8 PG (ref 26–34)
MCHC RBC AUTO-ENTMCNC: 31.6 G/DL (ref 32–36)
MCV RBC AUTO: 97 FL (ref 80–100)
NRBC BLD-RTO: 0 /100 WBCS (ref 0–0)
PLATELET # BLD AUTO: 615 X10*3/UL (ref 150–450)
PMV BLD AUTO: 8.4 FL (ref 7.5–11.5)
POTASSIUM SERPL-SCNC: 3.7 MMOL/L (ref 3.5–5.3)
PROT SERPL-MCNC: 6.5 G/DL (ref 6.4–8.2)
RBC # BLD AUTO: 3.41 X10*6/UL (ref 4–5.2)
SODIUM SERPL-SCNC: 140 MMOL/L (ref 136–145)
WBC # BLD AUTO: 13.9 X10*3/UL (ref 4.4–11.3)

## 2023-10-16 PROCEDURE — 82947 ASSAY GLUCOSE BLOOD QUANT: CPT

## 2023-10-16 PROCEDURE — 2500000004 HC RX 250 GENERAL PHARMACY W/ HCPCS (ALT 636 FOR OP/ED)

## 2023-10-16 PROCEDURE — 2500000001 HC RX 250 WO HCPCS SELF ADMINISTERED DRUGS (ALT 637 FOR MEDICARE OP)

## 2023-10-16 PROCEDURE — 2500000001 HC RX 250 WO HCPCS SELF ADMINISTERED DRUGS (ALT 637 FOR MEDICARE OP): Performed by: STUDENT IN AN ORGANIZED HEALTH CARE EDUCATION/TRAINING PROGRAM

## 2023-10-16 PROCEDURE — 80053 COMPREHEN METABOLIC PANEL: CPT

## 2023-10-16 PROCEDURE — 99239 HOSP IP/OBS DSCHRG MGMT >30: CPT

## 2023-10-16 PROCEDURE — RXMED WILLOW AMBULATORY MEDICATION CHARGE

## 2023-10-16 PROCEDURE — 96372 THER/PROPH/DIAG INJ SC/IM: CPT

## 2023-10-16 PROCEDURE — 2500000002 HC RX 250 W HCPCS SELF ADMINISTERED DRUGS (ALT 637 FOR MEDICARE OP, ALT 636 FOR OP/ED): Performed by: STUDENT IN AN ORGANIZED HEALTH CARE EDUCATION/TRAINING PROGRAM

## 2023-10-16 PROCEDURE — 83735 ASSAY OF MAGNESIUM: CPT

## 2023-10-16 PROCEDURE — 94640 AIRWAY INHALATION TREATMENT: CPT

## 2023-10-16 PROCEDURE — 85027 COMPLETE CBC AUTOMATED: CPT

## 2023-10-16 PROCEDURE — 2500000002 HC RX 250 W HCPCS SELF ADMINISTERED DRUGS (ALT 637 FOR MEDICARE OP, ALT 636 FOR OP/ED)

## 2023-10-16 RX ORDER — GABAPENTIN 400 MG/1
400 CAPSULE ORAL 2 TIMES DAILY
Start: 2023-10-16

## 2023-10-16 RX ORDER — TRAZODONE HYDROCHLORIDE 150 MG/1
150 TABLET ORAL NIGHTLY
Start: 2023-10-16

## 2023-10-16 RX ORDER — LOPERAMIDE HYDROCHLORIDE 2 MG/1
2 CAPSULE ORAL 2 TIMES DAILY PRN
Qty: 20 CAPSULE | Refills: 0 | Status: SHIPPED | OUTPATIENT
Start: 2023-10-16 | End: 2023-10-26

## 2023-10-16 RX ADMIN — BUMETANIDE 2 MG: 1 TABLET ORAL at 08:36

## 2023-10-16 RX ADMIN — SPIRONOLACTONE 50 MG: 25 TABLET ORAL at 08:35

## 2023-10-16 RX ADMIN — LISINOPRIL 40 MG: 20 TABLET ORAL at 08:35

## 2023-10-16 RX ADMIN — CARVEDILOL 25 MG: 25 TABLET, FILM COATED ORAL at 08:35

## 2023-10-16 RX ADMIN — FLUTICASONE FUROATE AND VILANTEROL TRIFENATATE 1 PUFF: 200; 25 POWDER RESPIRATORY (INHALATION) at 06:54

## 2023-10-16 RX ADMIN — GABAPENTIN 400 MG: 400 CAPSULE ORAL at 08:35

## 2023-10-16 RX ADMIN — ALBUTEROL SULFATE 2.5 MG: 2.5 SOLUTION RESPIRATORY (INHALATION) at 08:42

## 2023-10-16 RX ADMIN — ENOXAPARIN SODIUM 40 MG: 40 INJECTION SUBCUTANEOUS at 08:36

## 2023-10-16 RX ADMIN — LOPERAMIDE HYDROCHLORIDE 2 MG: 2 CAPSULE ORAL at 08:34

## 2023-10-16 RX ADMIN — DULOXETINE HYDROCHLORIDE 60 MG: 60 CAPSULE, DELAYED RELEASE ORAL at 08:35

## 2023-10-16 RX ADMIN — PREDNISONE 30 MG: 20 TABLET ORAL at 08:34

## 2023-10-16 RX ADMIN — MULTIPLE VITAMINS W/ MINERALS TAB 1 TABLET: TAB at 08:35

## 2023-10-16 RX ADMIN — AMLODIPINE BESYLATE 10 MG: 10 TABLET ORAL at 08:35

## 2023-10-16 RX ADMIN — FOLIC ACID 1 MG: 1 TABLET ORAL at 08:34

## 2023-10-16 RX ADMIN — MONTELUKAST 10 MG: 10 TABLET, FILM COATED ORAL at 08:34

## 2023-10-16 RX ADMIN — ASPIRIN 81 MG: 81 TABLET, COATED ORAL at 08:34

## 2023-10-16 ASSESSMENT — COGNITIVE AND FUNCTIONAL STATUS - GENERAL
MOBILITY SCORE: 23
DRESSING REGULAR LOWER BODY CLOTHING: A LITTLE
CLIMB 3 TO 5 STEPS WITH RAILING: A LITTLE
DAILY ACTIVITIY SCORE: 23

## 2023-10-16 ASSESSMENT — PAIN SCALES - WONG BAKER: WONGBAKER_NUMERICALRESPONSE: NO HURT

## 2023-10-16 ASSESSMENT — PAIN SCALES - GENERAL: PAINLEVEL_OUTOF10: 0 - NO PAIN

## 2023-10-16 ASSESSMENT — ACTIVITIES OF DAILY LIVING (ADL): LACK_OF_TRANSPORTATION: NO

## 2023-10-16 NOTE — NURSING NOTE
Discharge instructions discussed with patient. Imodium script filled by outpatient pharmacy. Belongings returned to patient. Awaiting daughter for ride home.

## 2023-10-16 NOTE — DISCHARGE SUMMARY
Discharge Diagnosis  Septic shock (CMS/HCC)    Issues Requiring Follow-Up  -Colitis follow up (treated with 7 day course of antibiotics while in hospital, stool testing negative, C. Diff negative) on loperamide 2mg BID PRN with PCP    Discharge Meds     Your medication list        START taking these medications        Instructions Last Dose Given Next Dose Due   gabapentin 400 mg capsule  Commonly known as: Neurontin      Take 1 capsule (400 mg) by mouth 2 times a day.       loperamide 2 mg capsule  Commonly known as: Imodium      Take 1 capsule (2 mg) by mouth 2 times a day as needed for diarrhea (For >3 loose stools in a day.) for up to 10 days.       traZODone 150 mg tablet  Commonly known as: Desyrel      Take 1 tablet (150 mg) by mouth once daily at bedtime.              CONTINUE taking these medications        Instructions Last Dose Given Next Dose Due   albuterol 2.5 mg /3 mL (0.083 %) nebulizer solution           ProAir HFA 90 mcg/actuation inhaler  Generic drug: albuterol           amLODIPine 10 mg tablet  Commonly known as: Norvasc           aspirin 81 mg EC tablet           atorvastatin 40 mg tablet  Commonly known as: Lipitor           bumetanide 2 mg tablet  Commonly known as: Bumex           carvedilol 25 mg tablet  Commonly known as: Coreg           diclofenac sodium 1 % gel gel  Commonly known as: Voltaren           DULoxetine 60 mg DR capsule  Commonly known as: Cymbalta           EpiPen 2-Dave 0.3 mg/0.3 mL injection syringe  Generic drug: EPINEPHrine           fluticasone 50 mcg/actuation nasal spray  Commonly known as: Flonase           lisinopril 40 mg tablet           metFORMIN 500 mg tablet  Commonly known as: Glucophage           metoclopramide 10 mg tablet  Commonly known as: Reglan           mometasone-formoterol 200-5 mcg/actuation inhaler  Commonly known as: Dulera 200           montelukast 10 mg tablet  Commonly known as: Singulair           spironolactone 50 mg tablet  Commonly known as:  Spoke to son and advised him if shes that bad she should be seen in ER  Patient does not feel she needs to be seen in ER  Will have x[facility] done at [facility] and follow up with Dr Sheila Aquino in office Monday  Aldactone                  STOP taking these medications      dilTIAZem  mg 24 hr capsule  Commonly known as: Cardizem CD                  Where to Get Your Medications        These medications were sent to [facility]  [id number], [place] [address]      Hours: 9 AM to 5 PM Mon-Fri Phone: [phone]   loperamide 2 mg capsule       Information about where to get these medications is not yet available    Ask your nurse or doctor about these medications  gabapentin 400 mg capsule  traZODone 150 mg tablet         Test Results Pending At Discharge  Pending Labs       No current pending labs.            Hospital Course  Keisha Santa is a 62 y.o. female with PMH of HTN, HLD, T2DM, MUKESH non-compliant with CPAP, COPD, Parkinsonism, cervical cancer, 2 CVAs, HFpEF w/ EF 65%, PVD, chronic diarrhea, and chronic alcoholism who presented to [facility] ED with weakness, diarrhea, and worsening mental status Patient lives in [place], but has traveled to [place] for a family wedding. Patient has had chronic diarrhea for about 4 months, and has been worked up outpatient for it. For 2-3 days prior to admission, the patient has had worsening weakness, periumbilical and lower abdominal pain, and poor PO intake. Patient was recently seen in [place] OP clinic on 10/3 for LLE cellulitis, and was prescribed Bactrim and mupirocin cream for it. At the ED, the patient was found to be hypotensive and tachycardic, which required Levophed. She was found to have low SpO2, and was started on 3L O2 via NC. She received a CT Chest abdomen and pelvis which showed a fluid-filled large bowel that's concerning for colitis. On labs, she was found to have significant hyponatremia at 118, and hyperkalemia at 6.0. UA notable for 500 leukocyte esterase. BUN/Cr 77/2.12, her baseline per chart review is around 0.7. WBC count of 27.2. She received a VBG which showed pH 7.28, pCO2 39, pO2 38, Lactate 5.4 and bicarb of 18.3. She received  cefepime, metronidazole, 2.5L fluid bolus, and bicarb, and was transferred to [facility] ICU and admitted for septic shock 2/2 colitis with possible component of cellulitis and UTI.    ICU course: Upon arrival to ICU, the patient was started on ceftriaxone and continued metronidazole. Patient received a 1L LR bolus. Repeat labs showed mild improvement of kidney function. Stool studies and C. Diff was sent out to rule out an infectious process of diarrhea. Given concern for cellulitis and C.diff infection, patient's antibiotics were upgraded to IV and PO vancomycin and Zosyn. C. Diff negative, so PO vanc was discontinued. ID consulted. Levophed was discontinued on day 10/8 and BP's remained stable.  Received more fluids with improvement of kidney function. Patient continued to show improvement of mental status, and was able to wean down O2.  IV vancomycin was discontinued on 10/9 as concerns for cellulitis were low, and she was continued on IV Zosyn.  Patient had an episode of acute hypoxia and respiratory distress on 10/10 AM during rounds and found to be wheezing.  Given steroids, DuoNebs, and placed on high flow nasal cannula for COPD exacerbation with subsequent symptomatic improvement.  CT/PE negative for PE, but did show worsening interstitial edema.  Maintenance fluids were discontinued, and patient remains stable, so decision was made to transfer her to the floors on 10/10.    On the medical floors pt was continued on home HTN/CHF medications (amlodipine 10mg daily, carvedilol 25mg BID, spironolactone 50mg) and started on lisinopril 10mg. She was gradually increased to home lisinopril of 40mg daily. She completed a 3 day course of prednisone 50mg, followed by 30mg for 2 days which improved pt's breathing. Her NC oxygen was weaned on the floor to RA. IV zosyn was stopped after a 7 day course for colitis. Pt continued to experience frequent watery stools, C diff testing was repeated. Once negative, pt was  started on imodium 2mg BID with improvement in stool output. PT/OT evaluated pt and advised moderate intensity level of continued care. Pt was discharged to home due to lack of insurance coverage for SNF. Prescription for imodium was provided. Pt was told to follow up with PCP in [place], pt declined establishing care in [facility] as she is here temporarily.    Pertinent Physical Exam At Time of Discharge  Physical Exam  Constitutional:       Appearance: Normal appearance. She is not ill-appearing.   HENT:      Head: Normocephalic and atraumatic.   Eyes:      Conjunctiva/sclera: Conjunctivae normal.   Cardiovascular:      Rate and Rhythm: Normal rate and regular rhythm.      Heart sounds: Normal heart sounds.   Pulmonary:      Effort: Pulmonary effort is normal. No respiratory distress.      Breath sounds: Normal breath sounds. No wheezing, rhonchi or rales.   Abdominal:      General: Abdomen is flat. Bowel sounds are normal. There is no distension.      Palpations: Abdomen is soft.      Tenderness: There is no abdominal tenderness.   Musculoskeletal:         General: No swelling.   Skin:     General: Skin is warm and dry.   Neurological:      Mental Status: She is alert.         Outpatient Follow-Up  No future appointments.  Instructed to call PCP in [place] upon discharge follow up.      Debra Miller MD

## 2023-10-16 NOTE — PROGRESS NOTES
Physical Therapy                 Therapy Communication Note    Patient Name: Keisha Santa  MRN: [id number]  Today's Date: 10/16/2023     Discipline: Physical Therapy    Missed Visit Reason: Missed Visit Reason: Other (Comment) (Per nurse: Patient currently being DC. Attempt @ 1158am)    Missed Time: Attempt    Comment:

## 2023-10-16 NOTE — PROGRESS NOTES
10/16/23 1147   Discharge Planning   Living Arrangements Spouse/significant other   Support Systems Spouse/significant other   Assistance Needed Independent with ADLs ar baseline, ambulates with a walker and drives. Patient lives in [place] and is here visiting her adult children   Type of Residence Private residence   Home or Post Acute Services Other (Comment);None  (patient refusing SNF and HHC)   Patient expects to be discharged to: Patient plan is to return to childrens home and is denying any needs at this time.   Financial Resource Strain   How hard is it for you to pay for the very basics like food, housing, medical care, and heating? Not hard   Housing Stability   In the last 12 months, was there a time when you were not able to pay the mortgage or rent on time? N   In the last 12 months, how many places have you lived? 1   In the last 12 months, was there a time when you did not have a steady place to sleep or slept in a shelter (including now)? N   Transportation Needs   In the past 12 months, has lack of transportation kept you from medical appointments or from getting medications? no   In the past 12 months, has lack of transportation kept you from meetings, work, or from getting things needed for daily living? No

## 2023-10-16 NOTE — DISCHARGE INSTRUCTIONS
Dear Ms. Arina,    You were admitted for colitis which is severe inflammation of the lining of your colon. You were treated with 7 days of antibiotics while in the hospital for colitis. CT imaging of your stomach confirmed this colitis. Our stool studies to examine an infectious cause of this inflammation were negative, meaning a specific cause of this colitis was not identified on laboratory testing. Because your blood work labs showed signs of improvement in terms of infection and inflammation, antibiotics were stopped. Additionally, your breathing improved and supplemental oxygen was discontinued. Your home heart and blood pressure medicines were resumed while you were in the hospital. You were started on loperamide also known as imodium in the hospital. We have sent a prescription to our pharmacy for imodium. You should only take this medicine if you are experiencing 3+ loose stools in a day, do not use more than twice a day as there is a risk of constipation with this medication. You should follow up with your PCP in [place] as soon as possible (we advise within 1-2 weeks of discharge). Please call your PCP upon discharge to schedule this appointment.    If you are staying in [place] longer than anticipated and need to establish a PCP here, please call [phone] to make an appointment

## 2023-10-16 NOTE — CARE PLAN
Met with patient in her room to follow-up on completion of Advanced Directives. She states that she does not want to complete this paperwork in the hospital at this time. She denies any homegoing needs, as she states she is returning to her home in [place] in about a week (she was here for granddaughter's wedding).

## 2023-10-16 NOTE — PROGRESS NOTES
Keisha Santa is a 62 y.o. female on day 9 of admission presenting with Septic shock (CMS/HCC).    Subjective   Interval History: no fever,         Review of Systems    Objective   Range of Vitals (last 24 hours)  Heart Rate:  [66-75]   Temp:  [35.7 °C (96.3 °F)-36.7 °C (98.1 °F)]   Resp:  [14-18]   BP: (112-161)/(44-95)   Weight:  [109 kg (239 lb 10.2 oz)]   SpO2:  [91 %-94 %]   Daily Weight  10/16/23 : 109 kg (239 lb 10.2 oz)    Body mass index is 42.46 kg/m².    Physical Exam  Constitutional:       Appearance: Normal appearance.   HENT:      Head: Normocephalic and atraumatic.      Mouth/Throat:      Mouth: Mucous membranes are moist.      Pharynx: Oropharynx is clear.   Eyes:      Pupils: Pupils are equal, round, and reactive to light.   Cardiovascular:      Rate and Rhythm: Normal rate and regular rhythm.      Heart sounds: Normal heart sounds.   Pulmonary:      Effort: Pulmonary effort is normal.      Breath sounds: Normal breath sounds.   Abdominal:      General: Abdomen is flat. Bowel sounds are normal.      Palpations: Abdomen is soft.   Musculoskeletal:      Cervical back: Normal range of motion.   Neurological:      Mental Status: She is alert.           Antibiotics  heparin (porcine) injection 7,500 Units  cefTRIAXone (Rocephin) IVPB 1 g  metroNIDAZOLE in NaCl (iso-os) (Flagyl)  mg  norepinephrine (Levophed) 8 mg in dextrose 5% 250 mL (0.032 mg/mL) infusion (premix)  flu vaccine (IIV4) age 6 months and greater, preservative free  lactated Ringer's bolus 1,000 mL  dilTIAZem (Cardizem CD) 24 hr capsule        amLODIPine (Norvasc) tablet  aspirin EC tablet  atorvastatin (Lipitor) tablet  bumetanide (Bumex) tablet  carvedilol (Coreg) tablet                montelukast (Singulair) tablet    albuterol 90 mcg/actuation inhaler 2 puff  albuterol 2.5 mg /3 mL (0.083 %) nebulizer solution 2.5 mg  aspirin EC tablet 81 mg  atorvastatin (Lipitor) tablet 40 mg  diclofenac sodium (Voltaren) 1 % gel 1  Application  EPINEPHrine (Epipen) injection syringe 0.3 mg  fluticasone (Flonase) nasal spray 2 spray  fluticasone furoate-vilanteroL (Breo Ellipta) 200-25 mcg/dose inhaler 1 puff  montelukast (Singulair) tablet 10 mg  dextrose 50 % injection 25 g  glucagon (Glucagen) injection 1 mg  dextrose 10 % in water (D10W) infusion  insulin lispro (HumaLOG) injection 0-5 Units  acetaminophen (Tylenol) tablet 650 mg  folic acid (Folvite) tablet 1 mg  multivitamin with minerals 1 tablet  LORazepam (Ativan) injection 0.5 mg  LORazepam (Ativan) injection 1 mg  LORazepam (Ativan) injection 2 mg  nystatin (Mycostatin) cream  vancomycin (Vancocin) capsule 125 mg  piperacillin-tazobactam-dextrose (Zosyn) IV 4.5 g  sodium chloride 0.9 % bolus 500 mL  vancomycin (Vancocin) 2,000 mg in dextrose 5 % in water (D5W) 500 mL IV  vancomycin in dextrose 5 % (Vancocin) IVPB 750 mg  atorvastatin (Lipitor) tablet 40 mg  diclofenac sodium (Voltaren) 1 % gel 1 Application  magnesium sulfate IV 2 g  sodium chloride 0.9 % bolus 1,000 mL  perflutren lipid microspheres (Definity) injection 0.5 mL  sulfur hexafluoride microsphr (Lumason) injection 24.28 mg  perflutren protein A microsphere (Optison) injection 0.5 mL  insulin lispro (HumaLOG) injection 0-10 Units  sodium chloride 0.9 % bolus 1,000 mL  HYDROmorphone (Dilaudid) injection 0.2 mg  gabapentin (Neurontin) capsule 300 mg  cyclobenzaprine (Flexeril) tablet 5 mg  sodium chloride 0.9 % bolus 1,000 mL  albuterol 2.5 mg /3 mL (0.083 %) nebulizer solution 2.5 mg  sodium chloride 0.9 % bolus 1,000 mL  sodium chloride 0.9 % bolus 1,000 mL  sodium chloride 0.9 % bolus 1,000 mL  dextrose 5 % in water (D5W) infusion  potassium phosphates 15 mmol in dextrose 5 % in water (D5W) 250 mL IV  vancomycin in dextrose 5 % (Vancocin) IVPB 1,000 mg  lactated Ringer's bolus 1,000 mL  sodium chloride 0.45 % infusion  oxyCODONE-acetaminophen (Percocet) 5-325 mg per tablet 1 tablet  oxyCODONE (Roxicodone) immediate  release tablet 10 mg  lactated Ringer's bolus 1,000 mL  lactated Ringer's bolus 1,000 mL  zinc oxide 20 % ointment 1 Application  gabapentin (Neurontin) capsule 400 mg  vancomycin (Vancocin) in dextrose 5% 250 mL IV 1,250 mg  piperacillin-tazobactam-dextrose (Zosyn) IV 4.5 g  atorvastatin (Lipitor) tablet 40 mg  magnesium sulfate IV 2 g  potassium phosphates 21 mmol in dextrose 5 % in water (D5W) 250 mL IV  enoxaparin (Lovenox) syringe 40 mg  ondansetron ODT (Zofran-ODT) disintegrating tablet 4 mg  ondansetron (Zofran) injection 4 mg  methylPREDNISolone sod succinate (PF) (SOLU-Medrol) 40 mg/mL injection  - Omnicell Override Pull  methylPREDNISolone sod succinate (PF) (SOLU-Medrol) injection 60 mg  oxygen (O2) therapy  lidocaine (Xylocaine) 10 mg/mL (1 %) injection 50 mg  sodium chloride 0.9% flush 10 mL  sodium chloride 0.9% flush 10 mL  iohexol (OMNIPaque) 350 mg iodine/mL injection 66 mL  traZODone (Desyrel) tablet 150 mg  amLODIPine (Norvasc) tablet 10 mg  carvedilol (Coreg) tablet 25 mg  spironolactone (Aldactone) tablet 50 mg  albuterol 2.5 mg /3 mL (0.083 %) nebulizer solution 2.5 mg  albuterol 2.5 mg /3 mL (0.083 %) nebulizer solution 2.5 mg  bumetanide (Bumex) tablet 2 mg  predniSONE (Deltasone) tablet 50 mg  acetaminophen (Tylenol) tablet 650 mg  acetaminophen (Tylenol) tablet 975 mg  amLODIPine (Norvasc) tablet 10 mg  carvedilol (Coreg) tablet 25 mg  DULoxetine (Cymbalta) DR capsule 60 mg  lisinopril tablet 10 mg  spironolactone (Aldactone) tablet 50 mg  albuterol 2.5 mg /3 mL (0.083 %) nebulizer solution 2.5 mg  oxygen (O2) therapy  potassium chloride CR (Klor-Con M20) ER tablet 40 mEq  magnesium sulfate IV 2 g  lisinopril tablet 20 mg  lisinopril tablet 10 mg  piperacillin-tazobactam-dextrose (Zosyn) IV 4.5 g  zinc oxide (Boudreauxs Butt Paste) 16 % ointment  potassium chloride CR (Klor-Con M20) ER tablet 40 mEq  predniSONE (Deltasone) tablet 30 mg  white petrolatum (Aquaphor) 41 % ointment 1  "Application  albuterol 2.5 mg /3 mL (0.083 %) nebulizer solution 2.5 mg  lisinopril tablet 40 mg  loperamide (Imodium) capsule 2 mg  loperamide (Imodium) capsule 2 mg  loperamide (Imodium) capsule      Relevant Results  Labs  Results from last 72 hours   Lab Units 10/16/23  0600 10/15/23  0752 10/14/23  0758   WBC AUTO x10*3/uL 13.9* 13.1* 10.6   HEMOGLOBIN g/dL 10.5* 9.9* 9.8*   HEMATOCRIT % 33.2* 30.7* 30.5*   PLATELETS AUTO x10*3/uL 615* 559* 537*     Results from last 72 hours   Lab Units 10/16/23  0600 10/15/23  0752 10/14/23  0758   SODIUM mmol/L 140 136 140   POTASSIUM mmol/L 3.7 3.8 3.4*   CHLORIDE mmol/L 100 97* 98   CO2 mmol/L 30 29 32   BUN mg/dL 17 15 15   CREATININE mg/dL 0.86 0.74 0.76   GLUCOSE mg/dL 102* 88 107*   CALCIUM mg/dL 9.2 8.7 8.9   ANION GAP mmol/L 14 14 13   EGFR mL/min/1.73m*2 77 >90 89   PHOSPHORUS mg/dL  --  3.4 3.5     Results from last 72 hours   Lab Units 10/16/23  0600 10/15/23  0752 10/14/23  0758   ALK PHOS U/L 61  --   --    BILIRUBIN TOTAL mg/dL 0.4  --   --    PROTEIN TOTAL g/dL 6.5  --   --    ALT U/L 43  --   --    AST U/L 22  --   --    ALBUMIN g/dL 3.8 3.4 3.5     Estimated Creatinine Clearance: 81.3 mL/min (by C-G formula based on SCr of 0.86 mg/dL).  No results found for: \"CRP\"  Microbiology  Reviewed  Imaging  reviewed      Assessment/Plan   Sepsis, the BC is negative, completed antibiotics  Abdominal pain / diarrhea, C. Diff is negative  Respiratory failure / COPD / atelectasis  Legs stasis  Encephalopathy, likely metabolic   Recommendations :  Supportive care     I spent  minutes in the professional and overall care of this patient.      Lizbeth Garcia MD  "

## 2023-11-30 ENCOUNTER — HOSPITAL ENCOUNTER (OUTPATIENT)
Dept: CARDIOLOGY | Facility: HOSPITAL | Age: 63
Discharge: HOME | End: 2023-11-30
Payer: MEDICAID

## 2023-11-30 PROCEDURE — 93005 ELECTROCARDIOGRAM TRACING: CPT

## 2023-12-13 LAB — HOLD SPECIMEN: NORMAL

## 2023-12-19 LAB
ATRIAL RATE: 103 BPM
P AXIS: 60 DEGREES
P OFFSET: 187 MS
P ONSET: 138 MS
PR INTERVAL: 158 MS
Q ONSET: 217 MS
QRS COUNT: 17 BEATS
QRS DURATION: 84 MS
QT INTERVAL: 316 MS
QTC CALCULATION(BAZETT): 413 MS
QTC FREDERICIA: 378 MS
R AXIS: 120 DEGREES
T AXIS: 32 DEGREES
T OFFSET: 375 MS
VENTRICULAR RATE: 103 BPM